# Patient Record
Sex: FEMALE | Race: WHITE | NOT HISPANIC OR LATINO | Employment: UNEMPLOYED | URBAN - METROPOLITAN AREA
[De-identification: names, ages, dates, MRNs, and addresses within clinical notes are randomized per-mention and may not be internally consistent; named-entity substitution may affect disease eponyms.]

---

## 2017-05-02 ENCOUNTER — HOSPITAL ENCOUNTER (EMERGENCY)
Facility: HOSPITAL | Age: 67
Discharge: HOME | End: 2017-05-02
Attending: EMERGENCY MEDICINE
Payer: COMMERCIAL

## 2017-05-02 VITALS
OXYGEN SATURATION: 96 % | TEMPERATURE: 98 F | WEIGHT: 161 LBS | DIASTOLIC BLOOD PRESSURE: 78 MMHG | HEART RATE: 65 BPM | RESPIRATION RATE: 16 BRPM | BODY MASS INDEX: 26.82 KG/M2 | HEIGHT: 65 IN | SYSTOLIC BLOOD PRESSURE: 169 MMHG

## 2017-05-02 DIAGNOSIS — G43.909 MIGRAINE WITHOUT STATUS MIGRAINOSUS, NOT INTRACTABLE, UNSPECIFIED MIGRAINE TYPE: Primary | ICD-10-CM

## 2017-05-02 LAB
ALBUMIN SERPL BCP-MCNC: 4.4 G/DL
ALP SERPL-CCNC: 188 U/L
ALT SERPL W/O P-5'-P-CCNC: 14 U/L
ANION GAP SERPL CALC-SCNC: 17 MMOL/L
AST SERPL-CCNC: 30 U/L
BASOPHILS # BLD AUTO: 0.02 K/UL
BASOPHILS NFR BLD: 0.3 %
BILIRUB SERPL-MCNC: 0.4 MG/DL
BUN SERPL-MCNC: 9 MG/DL
CALCIUM SERPL-MCNC: 9.6 MG/DL
CHLORIDE SERPL-SCNC: 100 MMOL/L
CO2 SERPL-SCNC: 22 MMOL/L
CREAT SERPL-MCNC: 0.7 MG/DL
DIFFERENTIAL METHOD: ABNORMAL
EOSINOPHIL # BLD AUTO: 0.1 K/UL
EOSINOPHIL NFR BLD: 0.6 %
ERYTHROCYTE [DISTWIDTH] IN BLOOD BY AUTOMATED COUNT: 13.5 %
EST. GFR  (AFRICAN AMERICAN): >60 ML/MIN/1.73 M^2
EST. GFR  (NON AFRICAN AMERICAN): >60 ML/MIN/1.73 M^2
GLUCOSE SERPL-MCNC: 125 MG/DL
HCT VFR BLD AUTO: 42.9 %
HGB BLD-MCNC: 14.4 G/DL
INR PPP: 1
LYMPHOCYTES # BLD AUTO: 0.9 K/UL
LYMPHOCYTES NFR BLD: 12.1 %
MCH RBC QN AUTO: 30.6 PG
MCHC RBC AUTO-ENTMCNC: 33.6 %
MCV RBC AUTO: 91 FL
MONOCYTES # BLD AUTO: 0.4 K/UL
MONOCYTES NFR BLD: 5.4 %
NEUTROPHILS # BLD AUTO: 6.3 K/UL
NEUTROPHILS NFR BLD: 81.5 %
PLATELET # BLD AUTO: 206 K/UL
PMV BLD AUTO: 11.2 FL
POTASSIUM SERPL-SCNC: 3.9 MMOL/L
PROT SERPL-MCNC: 8.6 G/DL
PROTHROMBIN TIME: 10.4 SEC
RBC # BLD AUTO: 4.71 M/UL
SODIUM SERPL-SCNC: 139 MMOL/L
WBC # BLD AUTO: 7.77 K/UL

## 2017-05-02 PROCEDURE — 96375 TX/PRO/DX INJ NEW DRUG ADDON: CPT

## 2017-05-02 PROCEDURE — 63600175 PHARM REV CODE 636 W HCPCS: Performed by: EMERGENCY MEDICINE

## 2017-05-02 PROCEDURE — 85025 COMPLETE CBC W/AUTO DIFF WBC: CPT

## 2017-05-02 PROCEDURE — 25000003 PHARM REV CODE 250: Performed by: EMERGENCY MEDICINE

## 2017-05-02 PROCEDURE — 99284 EMERGENCY DEPT VISIT MOD MDM: CPT | Mod: 25

## 2017-05-02 PROCEDURE — 85610 PROTHROMBIN TIME: CPT

## 2017-05-02 PROCEDURE — 96374 THER/PROPH/DIAG INJ IV PUSH: CPT

## 2017-05-02 PROCEDURE — 80053 COMPREHEN METABOLIC PANEL: CPT

## 2017-05-02 PROCEDURE — 96361 HYDRATE IV INFUSION ADD-ON: CPT

## 2017-05-02 RX ORDER — METOCLOPRAMIDE HYDROCHLORIDE 5 MG/ML
10 INJECTION INTRAMUSCULAR; INTRAVENOUS
Status: COMPLETED | OUTPATIENT
Start: 2017-05-02 | End: 2017-05-02

## 2017-05-02 RX ORDER — CYANOCOBALAMIN 1000 UG/ML
1000 INJECTION, SOLUTION INTRAMUSCULAR; SUBCUTANEOUS
COMMUNITY
Start: 2016-08-23 | End: 2017-08-18

## 2017-05-02 RX ORDER — ONDANSETRON 2 MG/ML
4 INJECTION INTRAMUSCULAR; INTRAVENOUS
Status: COMPLETED | OUTPATIENT
Start: 2017-05-02 | End: 2017-05-02

## 2017-05-02 RX ORDER — MORPHINE SULFATE 4 MG/ML
4 INJECTION, SOLUTION INTRAMUSCULAR; INTRAVENOUS
Status: COMPLETED | OUTPATIENT
Start: 2017-05-02 | End: 2017-05-02

## 2017-05-02 RX ORDER — ZOLPIDEM TARTRATE 10 MG/1
10 TABLET ORAL NIGHTLY PRN
COMMUNITY
Start: 2017-04-10 | End: 2023-10-09

## 2017-05-02 RX ORDER — KETOROLAC TROMETHAMINE 30 MG/ML
15 INJECTION, SOLUTION INTRAMUSCULAR; INTRAVENOUS
Status: COMPLETED | OUTPATIENT
Start: 2017-05-02 | End: 2017-05-02

## 2017-05-02 RX ORDER — DIPHENHYDRAMINE HYDROCHLORIDE 50 MG/ML
25 INJECTION INTRAMUSCULAR; INTRAVENOUS
Status: COMPLETED | OUTPATIENT
Start: 2017-05-02 | End: 2017-05-02

## 2017-05-02 RX ORDER — PROMETHAZINE HYDROCHLORIDE 25 MG/1
25 TABLET ORAL 3 TIMES DAILY PRN
COMMUNITY
Start: 2017-04-13 | End: 2019-01-30

## 2017-05-02 RX ORDER — ONDANSETRON 4 MG/1
4 TABLET, ORALLY DISINTEGRATING ORAL EVERY 6 HOURS PRN
Qty: 15 TABLET | Refills: 0 | Status: SHIPPED | OUTPATIENT
Start: 2017-05-02 | End: 2023-10-09

## 2017-05-02 RX ORDER — LORATADINE 10 MG/1
10 TABLET ORAL DAILY
COMMUNITY
Start: 2016-09-14 | End: 2017-09-14

## 2017-05-02 RX ADMIN — KETOROLAC TROMETHAMINE 15 MG: 30 INJECTION, SOLUTION INTRAMUSCULAR at 06:05

## 2017-05-02 RX ADMIN — ONDANSETRON 4 MG: 2 INJECTION INTRAMUSCULAR; INTRAVENOUS at 07:05

## 2017-05-02 RX ADMIN — DIPHENHYDRAMINE HYDROCHLORIDE 25 MG: 50 INJECTION, SOLUTION INTRAMUSCULAR; INTRAVENOUS at 06:05

## 2017-05-02 RX ADMIN — MORPHINE SULFATE 4 MG: 4 INJECTION, SOLUTION INTRAMUSCULAR; INTRAVENOUS at 07:05

## 2017-05-02 RX ADMIN — METOCLOPRAMIDE 10 MG: 5 INJECTION, SOLUTION INTRAMUSCULAR; INTRAVENOUS at 06:05

## 2017-05-02 RX ADMIN — SODIUM CHLORIDE 1000 ML: 0.9 INJECTION, SOLUTION INTRAVENOUS at 06:05

## 2017-05-02 NOTE — DISCHARGE INSTRUCTIONS
"  Migraine Headache: Stages and Treatment  A migraine headache tends to progress in stages. Learning these stages can help you better understand what is happening. Then you can learn ways to reduce pain and relieve other symptoms. Methods for relieving your symptoms include self-care and medicines.  Migraine stages  Migraines tend to progress through 4 stages. Many people don't have all stages, and stages may differ with each headache:  · Prodrome. A few hours to a day or so before the headache, you may feel tired, (yawning many times), uneasy, or van. You may also feel bloated or crave certain foods.  · Aura. Up to an hour before the headache starts, some migraine sufferers experience aura--flashing lights, blind spots, other vision problems, confusion, difficulty speaking, or other neurologic symptoms.  · Headache. Moderate to severe pain affects one side of the head and then can spread to both sides, often along with nausea. You may be highly sensitive to light, sound, and odors. Vomiting or diarrhea may also happen. This stage lasts 4 to 72 hours.  · Postdrome. After your headache ends, you may feel tired, achy, and "washed out." This may last for a day or so.    Self-care during a migraine  Here is what you can do:  · Use a cold compress. Wrap a thin cloth around a cold pack, a cold can of soda, or a bag of frozen vegetables. Apply this to your temple or other pain site.  · Drink fluids. If nausea makes it hard to drink, try sucking on ice.  · Rest. If possible, lie down. Try not to bend over, as this may increase your pain. Sometimes laying in a dark quiet room can help the migraine from being aggravated.    · Try caffeine. Some people find that drinking fluids with caffeine, such as coffee or tea, helps to lessen migraine pain.  Using medicines  Work with your healthcare provider to find the right medicines for you. Medicines for migraine may relieve pain (analgesics), relieve nausea, or attack the " migraine's root causes (migraine-specific medicines).  Rebound headache  Taking analgesics each day, or even several times a week, may lead to more frequent and severe headaches. These are called rebound headaches. If you think you're having rebound headaches, tell your healthcare provider. He or she can help you safely decrease your medicine. Rebound caffeine withdrawal headaches can also happen.    Date Last Reviewed: 10/9/2015  © 7728-7024 [employer]. [address] [place] [address], [place] [address]. All rights reserved. This information is not intended as a substitute for professional medical care. Always follow your healthcare professional's instructions.

## 2017-05-02 NOTE — ED PROVIDER NOTES
Encounter Date: 5/2/2017       History     Chief Complaint   Patient presents with    Migraine     Pt reports hx of migraines. Took imitrex x2 last PM with no relief. Also reports vomiting all night.      Review of patient's allergies indicates:   Allergen Reactions    Amitriptyline     Augmentin [amoxicillin-pot clavulanate]     Sulfa (sulfonamide antibiotics)      Patient is a 67 y.o. female presenting with the following complaint: headaches. The history is provided by the patient.   Headache    This is a recurrent problem. The current episode started yesterday. The problem occurs constantly. The problem has been unchanged. The pain is located in the frontal region. The pain does not radiate. The pain quality is similar to prior headaches. The quality of the pain is described as aching, dull, throbbing and similar to previous episodes. Pain scale: moderate. Associated symptoms include nausea, phonophobia, photophobia and vomiting. Pertinent negatives include no abdominal pain, abnormal behavior, anorexia, back pain, blurred vision, coughing, dizziness, drainage, ear pain, eye pain, eye redness, eye watering, facial sweating, fever, hearing loss, insomnia, loss of balance, muscle aches, neck pain, numbness, rhinorrhea, scalp tenderness, seizures, sinus pressure, sore throat, swollen glands, tingling, tinnitus, visual change, weakness or weight loss. The symptoms are aggravated by bright light and noise. She has tried triptans (imitrex, but pt thinks she vomitted it up) for the symptoms. The treatment provided no relief. Her past medical history is significant for migraine headaches and migraines in the family. There is no history of cancer, cluster headaches, hypertension, immunosuppression, obesity, pseudotumor cerebri, recent head traumas, sinus disease or TMJ.     Past Medical History:   Diagnosis Date    Chronic headaches     Depression     Diverticulitis     GERD (gastroesophageal reflux disease)      Hypercholesteremia     Hypertension     Migraine headache      Past Surgical History:   Procedure Laterality Date    ABDOMINAL SURGERY      APPENDECTOMY      CATARACT EXTRACTION      EYE SURGERY      HYSTERECTOMY      TONSILLECTOMY       History reviewed. No pertinent family history.  Social History   Substance Use Topics    Smoking status: Former Smoker    Smokeless tobacco: Never Used    Alcohol use No     Review of Systems   Constitutional: Negative for fever and weight loss.   HENT: Negative for ear pain, hearing loss, rhinorrhea, sinus pressure, sore throat and tinnitus.    Eyes: Positive for photophobia. Negative for blurred vision, pain and redness.   Respiratory: Negative for cough and shortness of breath.    Cardiovascular: Negative for chest pain.   Gastrointestinal: Positive for nausea and vomiting. Negative for abdominal pain and anorexia.   Genitourinary: Negative for dysuria.   Musculoskeletal: Negative for back pain and neck pain.   Skin: Negative for rash.   Neurological: Positive for headaches. Negative for dizziness, tingling, seizures, weakness, numbness and loss of balance.   Hematological: Does not bruise/bleed easily.   Psychiatric/Behavioral: The patient does not have insomnia.    All other systems reviewed and are negative.      Physical Exam   Initial Vitals   BP Pulse Resp Temp SpO2   05/02/17 0520 05/02/17 0520 05/02/17 0520 05/02/17 0520 05/02/17 0520   173/92 81 20 97.7 °F (36.5 °C) 95 %     Physical Exam    Nursing note and vitals reviewed.  Constitutional: She appears well-developed and well-nourished.   HENT:   Head: Normocephalic and atraumatic. Head is without contusion.       Right Ear: Hearing, tympanic membrane, external ear and ear canal normal.   Left Ear: Hearing, tympanic membrane, external ear and ear canal normal.   Nose: Nose normal.   Mouth/Throat: Uvula is midline, oropharynx is clear and moist and mucous membranes are normal. No oropharyngeal exudate.   Eyes:  "Conjunctivae and EOM are normal. Pupils are equal, round, and reactive to light.   Neck: Normal range of motion. Neck supple. No thyromegaly present.   Cardiovascular: Normal rate, regular rhythm, normal heart sounds and intact distal pulses. Exam reveals no gallop and no friction rub.    No murmur heard.  Pulmonary/Chest: Breath sounds normal. No respiratory distress. She has no wheezes. She has no rhonchi. She exhibits no tenderness.   Abdominal: Soft. Bowel sounds are normal. She exhibits no distension. There is no tenderness. There is no rebound and no guarding.   Musculoskeletal: Normal range of motion. She exhibits no edema or tenderness.   Lymphadenopathy:     She has no cervical adenopathy.   Neurological: She is alert and oriented to person, place, and time. She has normal strength. No cranial nerve deficit or sensory deficit.   Skin: Skin is warm and dry. No rash noted.   Psychiatric: She has a normal mood and affect. Her behavior is normal. Judgment and thought content normal.         ED Course   Procedures  Labs Reviewed   CBC W/ AUTO DIFFERENTIAL - Abnormal; Notable for the following:        Result Value    Lymph # 0.9 (*)     Gran% 81.5 (*)     Lymph% 12.1 (*)     All other components within normal limits   COMPREHENSIVE METABOLIC PANEL - Abnormal; Notable for the following:     CO2 22 (*)     Glucose 125 (*)     Total Protein 8.6 (*)     Alkaline Phosphatase 188 (*)     Anion Gap 17 (*)     All other components within normal limits   PROTIME-INR          Vitals:    05/02/17 0520   BP: (!) 173/92   Pulse: 81   Resp: 20   Temp: 97.7 °F (36.5 °C)   TempSrc: Oral   SpO2: 95%   Weight: 73 kg (161 lb)   Height: 5' 5" (1.651 m)       Results for orders placed or performed during the hospital encounter of 05/02/17   CBC auto differential   Result Value Ref Range    WBC 7.77 3.90 - 12.70 K/uL    RBC 4.71 4.00 - 5.40 M/uL    Hemoglobin 14.4 12.0 - 16.0 g/dL    Hematocrit 42.9 37.0 - 48.5 %    MCV 91 82 - 98 fL "    MCH 30.6 27.0 - 31.0 pg    MCHC 33.6 32.0 - 36.0 %    RDW 13.5 11.5 - 14.5 %    Platelets 206 150 - 350 K/uL    MPV 11.2 9.2 - 12.9 fL    Gran # 6.3 1.8 - 7.7 K/uL    Lymph # 0.9 (L) 1.0 - 4.8 K/uL    Mono # 0.4 0.3 - 1.0 K/uL    Eos # 0.1 0.0 - 0.5 K/uL    Baso # 0.02 0.00 - 0.20 K/uL    Gran% 81.5 (H) 38.0 - 73.0 %    Lymph% 12.1 (L) 18.0 - 48.0 %    Mono% 5.4 4.0 - 15.0 %    Eosinophil% 0.6 0.0 - 8.0 %    Basophil% 0.3 0.0 - 1.9 %    Differential Method Automated    Comprehensive metabolic panel   Result Value Ref Range    Sodium 139 136 - 145 mmol/L    Potassium 3.9 3.5 - 5.1 mmol/L    Chloride 100 95 - 110 mmol/L    CO2 22 (L) 23 - 29 mmol/L    Glucose 125 (H) 70 - 110 mg/dL    BUN, Bld 9 8 - 23 mg/dL    Creatinine 0.7 0.5 - 1.4 mg/dL    Calcium 9.6 8.7 - 10.5 mg/dL    Total Protein 8.6 (H) 6.0 - 8.4 g/dL    Albumin 4.4 3.5 - 5.2 g/dL    Total Bilirubin 0.4 0.1 - 1.0 mg/dL    Alkaline Phosphatase 188 (H) 55 - 135 U/L    AST 30 10 - 40 U/L    ALT 14 10 - 44 U/L    Anion Gap 17 (H) 8 - 16 mmol/L    eGFR if African American >60.0 >60 mL/min/1.73 m^2    eGFR if non African American >60.0 >60 mL/min/1.73 m^2   Protime-INR   Result Value Ref Range    Prothrombin Time 10.4 9.0 - 12.5 sec    INR 1.0 0.8 - 1.2         Imaging Results     None          Medications   sodium chloride 0.9% bolus 1,000 mL (1,000 mLs Intravenous New Bag 5/2/17 0611)   metoclopramide HCl injection 10 mg (10 mg Intravenous Given 5/2/17 0609)   diphenhydrAMINE injection 25 mg (25 mg Intravenous Given 5/2/17 0611)   ketorolac injection 15 mg (15 mg Intravenous Given 5/2/17 0611)   morphine injection 4 mg (4 mg Intravenous Given 5/2/17 0702)   ondansetron injection 4 mg (4 mg Intravenous Given 5/2/17 0702)     6:13 AM - Transfer of Care: Patient care transferred from Dr. Perez to Dr. Coreas, pending labs and reevaluation of HA.        6:50 AM - Re-evaluation:  The patient is resting comfortably and is in no acute distress. Discussed test  results and notified of pending labs. Answered questions at this time.     7:17 AM: Reassessed pt at this time.  Pt states her condition has improved at this time. Discussed with pt all pertinent ED information and results. Discussed pt dx of migraine and plan of tx. Gave pt all f/u and return to the ED instructions. All questions and concerns were addressed at this time. Pt expresses understanding of information and instructions, and is comfortable with plan to discharge. Pt is stable for discharge.        Gaby Stanley was given a handout which discussed their disease process, precautions, and instructions for follow-up and therapy.    Follow-up Information     Follow up with Santos Sharma MD In 2 days.    Specialty:  Internal Medicine    Contact information:    [id number] [place] [id number]  [phone]            New Prescriptions    ONDANSETRON (ZOFRAN-ODT) 4 MG TBDL    Take 1 tablet (4 mg total) by mouth every 6 (six) hours as needed.          ED Diagnosis  1. Migraine without status migrainosus, not intractable, unspecified migraine type                             ED Course     Clinical Impression:   The encounter diagnosis was Migraine without status migrainosus, not intractable, unspecified migraine type.    Disposition:   Disposition: Discharged       James Coreas MD  05/02/17 0712

## 2017-05-02 NOTE — ED NOTES
Pt reports minimal relief from toradol, benedryl & reglan. States she is still nauseated & her JENKINS is unchanged. Dr. Coreas notified.

## 2017-05-02 NOTE — ED AVS SNAPSHOT
[facility]  [address]  [place] [address]               Gaby Stanley   2017  5:27 AM   ED    Description:  Female : 1950   Department:  [facility]           Your Care was Coordinated By:     Provider Role From To    Vini Perez Jr., MD Attending Provider 17 0529 --      Reason for Visit     Migraine           Diagnoses this Visit        Comments    Migraine without status migrainosus, not intractable, unspecified migraine type    -  Primary       ED Disposition     ED Disposition Condition Comment    Discharge             To Do List           Follow-up Information     Follow up with Santos Sharma MD In 2 days.    Specialty:  Internal Medicine    Contact information:    [address]  [place] [address]  [phone]         These Medications        Disp Refills Start End    ondansetron (ZOFRAN-ODT) 4 MG TbDL 15 tablet 0 2017     Take 1 tablet (4 mg total) by mouth every 6 (six) hours as needed. - Oral    Pharmacy: [facility] - [place], [place] - [address] Ph #: [phone]         [facility]     [facility] Care Line -  Assistance  Unless otherwise directed by your provider, please contact [facility], our nurse care line that is available for  assistance.     Registered nurses in the [facility] provide: appointment scheduling, clinical advisement, health education, and other advisory services.  Call: [phone] (toll free)               Medications           Message regarding Medications     Verify the changes and/or additions to your medication regime listed below are the same as discussed with your clinician today.  If any of these changes or additions are incorrect, please notify your healthcare provider.        START taking these NEW medications        Refills    ondansetron (ZOFRAN-ODT) 4 MG TbDL 0    Sig: Take 1 tablet (4 mg  total) by mouth every 6 (six) hours as needed.    Class: Print    Route: Oral      These medications were administered today        Dose Freq    sodium chloride 0.9% bolus 1,000 mL 1,000 mL ED 1 Time    Sig: Inject 1,000 mLs into the vein ED 1 Time.    Class: Normal    Route: Intravenous    metoclopramide HCl injection 10 mg 10 mg ED 1 Time    Sig: Inject 2 mLs (10 mg total) into the vein ED 1 Time.    Class: Normal    Route: Intravenous    diphenhydrAMINE injection 25 mg 25 mg ED 1 Time    Sig: Inject 0.5 mLs (25 mg total) into the vein ED 1 Time.    Class: Normal    Route: Intravenous    ketorolac injection 15 mg 15 mg ED 1 Time    Sig: Inject 15 mg into the vein ED 1 Time.    Class: Normal    Route: Intravenous    morphine injection 4 mg 4 mg ED 1 Time    Sig: Inject 1 mL (4 mg total) into the vein ED 1 Time.    Class: Normal    Route: Intravenous    ondansetron injection 4 mg 4 mg ED 1 Time    Sig: Inject 4 mg into the vein ED 1 Time.    Class: Normal    Route: Intravenous      STOP taking these medications     ferrous gluconate (FERGON) 324 MG tablet Take 324 mg by mouth daily with breakfast.    ezetimibe (ZETIA) 10 mg tablet Take 10 mg by mouth once daily.    BUTALB/ACETAMINOPHEN/CAFFEINE (FIORICET ORAL) Take by mouth as needed.     ondansetron (ZOFRAN) 4 MG tablet Take 1 tablet (4 mg total) by mouth every 8 (eight) hours as needed.           Verify that the below list of medications is an accurate representation of the medications you are currently taking.  If none reported, the list may be blank. If incorrect, please contact your healthcare provider. Carry this list with you in case of emergency.           Current Medications     amlodipine-benazepril 5-10 mg (LOTREL) 5-10 mg per capsule Take 1 capsule by mouth once daily.    cyanocobalamin 1,000 mcg/mL injection Inject 1,000 mcg into the muscle every 30 days.    lipase-protease-amylase (ZENPEP) 25,000-85,000- 136,000 unit CpDR Take by mouth before meals and  "at bedtime as needed.    loratadine (CLARITIN) 10 mg tablet Take 10 mg by mouth once daily at 6am.    pantoprazole (PROTONIX) 40 MG tablet Take 40 mg by mouth 2 (two) times daily.     promethazine (PHENERGAN) 25 MG tablet Take 25 mg by mouth 3 (three) times daily as needed.    ROSUVASTATIN CALCIUM (CRESTOR ORAL) Take by mouth.    sertraline (ZOLOFT) 100 MG tablet Take 100 mg by mouth once daily.    SUMATRIPTAN SUCCINATE (IMITREX ORAL) Take by mouth as needed.     zolpidem (AMBIEN) 10 mg Tab Take 10 mg by mouth nightly as needed.    ondansetron (ZOFRAN-ODT) 4 MG TbDL Take 1 tablet (4 mg total) by mouth every 6 (six) hours as needed.           Clinical Reference Information           Your Vitals Were     BP Pulse Temp Resp Height Weight    173/92 (BP Location: Right arm, Patient Position: Sitting) 81 97.7 °F (36.5 °C) (Oral) 20 5' 5" (1.651 m) 73 kg (161 lb)    SpO2 BMI             95% 26.79 kg/m2         Allergies as of 5/2/2017        Reactions    Amitriptyline     Augmentin [Amoxicillin-pot Clavulanate]     Sulfa (Sulfonamide Antibiotics)       Immunizations Administered on Date of Encounter - 5/2/2017     None      ED Micro, Lab, POCT     Start Ordered       Status Ordering Provider    05/02/17 0547 05/02/17 0547  CBC auto differential  STAT      Final result     05/02/17 0547 05/02/17 0547  Comprehensive metabolic panel  STAT      Final result     05/02/17 0547 05/02/17 0547  Protime-INR  STAT      Final result       ED Imaging Orders     None        Discharge Instructions         Migraine Headache: Stages and Treatment  A migraine headache tends to progress in stages. Learning these stages can help you better understand what is happening. Then you can learn ways to reduce pain and relieve other symptoms. Methods for relieving your symptoms include self-care and medicines.  Migraine stages  Migraines tend to progress through 4 stages. Many people don't have all stages, and stages may differ with each " "headache:  · Prodrome. A few hours to a day or so before the headache, you may feel tired, (yawning many times), uneasy, or van. You may also feel bloated or crave certain foods.  · Aura. Up to an hour before the headache starts, some migraine sufferers experience aura--flashing lights, blind spots, other vision problems, confusion, difficulty speaking, or other neurologic symptoms.  · Headache. Moderate to severe pain affects one side of the head and then can spread to both sides, often along with nausea. You may be highly sensitive to light, sound, and odors. Vomiting or diarrhea may also happen. This stage lasts 4 to 72 hours.  · Postdrome. After your headache ends, you may feel tired, achy, and "washed out." This may last for a day or so.    Self-care during a migraine  Here is what you can do:  · Use a cold compress. Wrap a thin cloth around a cold pack, a cold can of soda, or a bag of frozen vegetables. Apply this to your temple or other pain site.  · Drink fluids. If nausea makes it hard to drink, try sucking on ice.  · Rest. If possible, lie down. Try not to bend over, as this may increase your pain. Sometimes laying in a dark quiet room can help the migraine from being aggravated.    · Try caffeine. Some people find that drinking fluids with caffeine, such as coffee or tea, helps to lessen migraine pain.  Using medicines  Work with your healthcare provider to find the right medicines for you. Medicines for migraine may relieve pain (analgesics), relieve nausea, or attack the migraine's root causes (migraine-specific medicines).  Rebound headache  Taking analgesics each day, or even several times a week, may lead to more frequent and severe headaches. These are called rebound headaches. If you think you're having rebound headaches, tell your healthcare provider. He or she can help you safely decrease your medicine. Rebound caffeine withdrawal headaches can also happen.    Date Last Reviewed: 10/9/2015  © " 5153-9209 [employer]. [address] [place] [address], [place] [address]. All rights reserved. This information is not intended as a substitute for professional medical care. Always follow your healthcare professional's instructions.          [facility] Sign-Up     Activating your [facility] account is as easy as 1-2-3!     1) Visit [email or web address], select Sign Up Now, enter this activation code and your date of birth, then select Next.  [email or web address]  Expires: 6/16/2017  7:16 AM      2) Create a username and password to use when you visit [facility] in the future and select a security question in case you lose your password and select Next.    3) Enter your e-mail address and click Sign Up!    Additional Information  If you have questions, please e-mail [email or web address] or call [phone] to talk to our [facility] staff. Remember, [facility] is NOT to be used for urgent needs. For medical emergencies, dial 911.          [facility] complies with applicable Federal civil rights laws and does not discriminate on the basis of race, color, national origin, age, disability, or sex.        Language Assistance Services     ATTENTION: Language assistance services are available, free of charge. Please call [phone].      ATENCIÓN: Si habla español, tiene a call disposición servicios gratuitos de asistencia lingüística. Llame al [phone].     CHÚ Ý: N?u b?n nói Ti?ng Vi?t, có các d?ch v? h? tr? ngôn ng? mi?n phí dành cho b?n. G?i s? [phone].

## 2019-01-30 ENCOUNTER — HOSPITAL ENCOUNTER (EMERGENCY)
Facility: HOSPITAL | Age: 69
Discharge: HOME | End: 2019-01-30
Attending: EMERGENCY MEDICINE
Payer: COMMERCIAL

## 2019-01-30 VITALS
OXYGEN SATURATION: 99 % | SYSTOLIC BLOOD PRESSURE: 187 MMHG | TEMPERATURE: 98 F | RESPIRATION RATE: 16 BRPM | BODY MASS INDEX: 26.27 KG/M2 | HEIGHT: 64 IN | HEART RATE: 79 BPM | DIASTOLIC BLOOD PRESSURE: 89 MMHG | WEIGHT: 153.88 LBS

## 2019-01-30 DIAGNOSIS — K52.9 GASTROENTERITIS: Primary | ICD-10-CM

## 2019-01-30 DIAGNOSIS — R51.9 NONINTRACTABLE HEADACHE, UNSPECIFIED CHRONICITY PATTERN, UNSPECIFIED HEADACHE TYPE: ICD-10-CM

## 2019-01-30 DIAGNOSIS — I10 CHRONIC HYPERTENSION: ICD-10-CM

## 2019-01-30 LAB
ALBUMIN SERPL BCP-MCNC: 4.7 G/DL
ALP SERPL-CCNC: 151 U/L
ALT SERPL W/O P-5'-P-CCNC: 9 U/L
ANION GAP SERPL CALC-SCNC: 17 MMOL/L
AST SERPL-CCNC: 17 U/L
BASOPHILS # BLD AUTO: 0.02 K/UL
BASOPHILS NFR BLD: 0.2 %
BILIRUB SERPL-MCNC: 0.4 MG/DL
BILIRUB UR QL STRIP: NEGATIVE
BUN SERPL-MCNC: 12 MG/DL
CALCIUM SERPL-MCNC: 10.6 MG/DL
CHLORIDE SERPL-SCNC: 98 MMOL/L
CLARITY UR REFRACT.AUTO: CLEAR
CO2 SERPL-SCNC: 30 MMOL/L
COLOR UR AUTO: YELLOW
CREAT SERPL-MCNC: 0.8 MG/DL
DIFFERENTIAL METHOD: ABNORMAL
EOSINOPHIL # BLD AUTO: 0.1 K/UL
EOSINOPHIL NFR BLD: 0.6 %
ERYTHROCYTE [DISTWIDTH] IN BLOOD BY AUTOMATED COUNT: 13.3 %
EST. GFR  (AFRICAN AMERICAN): >60 ML/MIN/1.73 M^2
EST. GFR  (NON AFRICAN AMERICAN): >60 ML/MIN/1.73 M^2
GLUCOSE SERPL-MCNC: 109 MG/DL
GLUCOSE UR QL STRIP: NEGATIVE
HCT VFR BLD AUTO: 45.1 %
HGB BLD-MCNC: 14.9 G/DL
HGB UR QL STRIP: NEGATIVE
KETONES UR QL STRIP: NEGATIVE
LEUKOCYTE ESTERASE UR QL STRIP: NEGATIVE
LYMPHOCYTES # BLD AUTO: 1.4 K/UL
LYMPHOCYTES NFR BLD: 15 %
MCH RBC QN AUTO: 30.5 PG
MCHC RBC AUTO-ENTMCNC: 33 G/DL
MCV RBC AUTO: 92 FL
MONOCYTES # BLD AUTO: 0.9 K/UL
MONOCYTES NFR BLD: 9.8 %
NEUTROPHILS # BLD AUTO: 6.7 K/UL
NEUTROPHILS NFR BLD: 74.3 %
NITRITE UR QL STRIP: NEGATIVE
PH UR STRIP: 7 [PH] (ref 5–8)
PLATELET # BLD AUTO: 246 K/UL
PMV BLD AUTO: 10.7 FL
POTASSIUM SERPL-SCNC: 3.3 MMOL/L
PROT SERPL-MCNC: 8.5 G/DL
PROT UR QL STRIP: ABNORMAL
RBC # BLD AUTO: 4.89 M/UL
SODIUM SERPL-SCNC: 145 MMOL/L
SP GR UR STRIP: 1.01 (ref 1–1.03)
URN SPEC COLLECT METH UR: ABNORMAL
UROBILINOGEN UR STRIP-ACNC: NEGATIVE EU/DL
WBC # BLD AUTO: 9.01 K/UL

## 2019-01-30 PROCEDURE — 25000003 PHARM REV CODE 250: Mod: ER | Performed by: EMERGENCY MEDICINE

## 2019-01-30 PROCEDURE — 81003 URINALYSIS AUTO W/O SCOPE: CPT | Mod: ER

## 2019-01-30 PROCEDURE — 96365 THER/PROPH/DIAG IV INF INIT: CPT | Mod: ER

## 2019-01-30 PROCEDURE — 96361 HYDRATE IV INFUSION ADD-ON: CPT | Mod: ER

## 2019-01-30 PROCEDURE — 96372 THER/PROPH/DIAG INJ SC/IM: CPT | Mod: ER

## 2019-01-30 PROCEDURE — 80053 COMPREHEN METABOLIC PANEL: CPT | Mod: ER

## 2019-01-30 PROCEDURE — 99284 EMERGENCY DEPT VISIT MOD MDM: CPT | Mod: 25,ER

## 2019-01-30 PROCEDURE — 85025 COMPLETE CBC W/AUTO DIFF WBC: CPT | Mod: ER

## 2019-01-30 PROCEDURE — 63600175 PHARM REV CODE 636 W HCPCS: Mod: ER | Performed by: EMERGENCY MEDICINE

## 2019-01-30 RX ORDER — ACETAMINOPHEN 500 MG
1000 TABLET ORAL
Status: DISCONTINUED | OUTPATIENT
Start: 2019-01-30 | End: 2019-01-31 | Stop reason: HOSPADM

## 2019-01-30 RX ORDER — PROMETHAZINE HYDROCHLORIDE 25 MG/1
12.5 TABLET ORAL EVERY 6 HOURS PRN
Qty: 15 TABLET | Refills: 0 | Status: SHIPPED | OUTPATIENT
Start: 2019-01-30 | End: 2024-03-12

## 2019-01-30 RX ORDER — ONDANSETRON 4 MG/1
4 TABLET, ORALLY DISINTEGRATING ORAL
Status: COMPLETED | OUTPATIENT
Start: 2019-01-30 | End: 2019-01-30

## 2019-01-30 RX ORDER — AMLODIPINE BESYLATE 5 MG/1
5 TABLET ORAL
Status: DISCONTINUED | OUTPATIENT
Start: 2019-01-30 | End: 2019-01-30

## 2019-01-30 RX ORDER — AMLODIPINE BESYLATE 5 MG/1
10 TABLET ORAL
Status: COMPLETED | OUTPATIENT
Start: 2019-01-30 | End: 2019-01-30

## 2019-01-30 RX ORDER — METOCLOPRAMIDE HYDROCHLORIDE 5 MG/ML
10 INJECTION INTRAMUSCULAR; INTRAVENOUS
Status: COMPLETED | OUTPATIENT
Start: 2019-01-30 | End: 2019-01-30

## 2019-01-30 RX ADMIN — ONDANSETRON 4 MG: 4 TABLET, ORALLY DISINTEGRATING ORAL at 07:01

## 2019-01-30 RX ADMIN — SODIUM CHLORIDE 1000 ML: 0.9 INJECTION, SOLUTION INTRAVENOUS at 08:01

## 2019-01-30 RX ADMIN — PROMETHAZINE HYDROCHLORIDE 12.5 MG: 25 INJECTION INTRAMUSCULAR; INTRAVENOUS at 08:01

## 2019-01-30 RX ADMIN — METOCLOPRAMIDE 10 MG: 5 INJECTION, SOLUTION INTRAMUSCULAR; INTRAVENOUS at 07:01

## 2019-01-30 RX ADMIN — AMLODIPINE BESYLATE 10 MG: 5 TABLET ORAL at 10:01

## 2019-02-04 NOTE — ED PROVIDER NOTES
"Encounter Date: 1/30/2019       History     Chief Complaint   Patient presents with    Nausea     c/o nausea, vomiting  with headache was seen at pcp on monday for "sinus" and put on azithromycin, medrol     Patient currently presents with chief complaint of nausea and vomiting.  Onset noted 1 day ago.  Patient notably was given Rx for Medrol and Azithromycin for mgmt of "sinus infection."  There have been several bouts of emesis and associated diarrhea.  Patient denies fever.  Patient denies sustained abdominal pain.  Patient denies urinary symptoms.  There is not blood in the stools.  Patient notes JENKINS began this afternoon.          Review of patient's allergies indicates:   Allergen Reactions    Amitriptyline     Augmentin [amoxicillin-pot clavulanate]     Sulfa (sulfonamide antibiotics)      Past Medical History:   Diagnosis Date    Chronic headaches     Depression     Diverticulitis     GERD (gastroesophageal reflux disease)     Hypercholesteremia     Hypertension     Migraine headache      Past Surgical History:   Procedure Laterality Date    ABDOMINAL SURGERY      APPENDECTOMY      CATARACT EXTRACTION      EYE SURGERY      HYSTERECTOMY      TONSILLECTOMY       History reviewed. No pertinent family history.  Social History     Tobacco Use    Smoking status: Former Smoker    Smokeless tobacco: Never Used   Substance Use Topics    Alcohol use: No    Drug use: No     Review of Systems   Constitutional: Negative for chills and fever.   HENT: Negative for congestion and rhinorrhea.    Respiratory: Negative for cough, chest tightness, shortness of breath and wheezing.    Cardiovascular: Negative for chest pain, palpitations and leg swelling.   Gastrointestinal: Positive for diarrhea, nausea and vomiting. Negative for abdominal pain and constipation.   Genitourinary: Negative for dysuria, frequency, urgency, vaginal bleeding and vaginal discharge.   Skin: Negative for color change and rash. " "  Allergic/Immunologic: Negative for immunocompromised state.   Neurological: Positive for headaches. Negative for dizziness, weakness and numbness.   Hematological: Negative for adenopathy. Does not bruise/bleed easily.   All other systems reviewed and are negative.    Physical Exam     Initial Vitals [01/30/19 1915]   BP Pulse Resp Temp SpO2   (!) 181/87 94 20 97.5 °F (36.4 °C) 98 %      MAP       --         Vitals:    01/30/19 1915 01/30/19 1934 01/30/19 1937 01/30/19 1940   BP: (!) 181/87 (S) (!) 204/99 (S) (!) 185/105 (S) (!) 150/90   Pulse: 94 (S) 97 97 (S) 97   Resp: 20      Temp: 97.5 °F (36.4 °C)      TempSrc: Oral      SpO2: 98%      Weight: 69.8 kg (153 lb 14.1 oz)      Height: 5' 4" (1.626 m)       01/30/19 2128 01/30/19 2242 01/30/19 2245   BP: (!) 180/92 (!) 187/89    Pulse: 85  79   Resp:   16   Temp:      TempSrc:      SpO2: 98%  99%   Weight:      Height:            Physical Exam    Nursing note and vitals reviewed.  Constitutional: She appears well-developed and well-nourished. She is not diaphoretic. No distress.   HENT:   Head: Normocephalic and atraumatic.   Right Ear: External ear normal.   Left Ear: External ear normal.   Nose: Nose normal.   Mouth/Throat: Oropharynx is clear and moist.   Eyes: Conjunctivae and EOM are normal. Pupils are equal, round, and reactive to light. No scleral icterus.   Neck: Neck supple. No tracheal deviation present. No JVD present.   Cardiovascular: Normal rate, regular rhythm, normal heart sounds and intact distal pulses. Exam reveals no gallop and no friction rub.    No murmur heard.  Pulmonary/Chest: Breath sounds normal. No respiratory distress. She has no wheezes. She has no rhonchi. She has no rales.   Abdominal: Soft. Bowel sounds are normal. She exhibits no distension. There is no tenderness.   Musculoskeletal: Normal range of motion. She exhibits no edema.   Neurological: She is alert and oriented to person, place, and time. She has normal strength. No " cranial nerve deficit or sensory deficit.   Skin: Skin is warm and dry. No rash noted.   Psychiatric: She has a normal mood and affect. Her behavior is normal.         ED Course   Procedures  Labs Reviewed   CBC W/ AUTO DIFFERENTIAL - Abnormal; Notable for the following components:       Result Value    Gran% 74.3 (*)     Lymph% 15.0 (*)     All other components within normal limits   COMPREHENSIVE METABOLIC PANEL - Abnormal; Notable for the following components:    Potassium 3.3 (*)     CO2 30 (*)     Calcium 10.6 (*)     Total Protein 8.5 (*)     Alkaline Phosphatase 151 (*)     ALT 9 (*)     Anion Gap 17 (*)     All other components within normal limits   URINALYSIS, REFLEX TO URINE CULTURE - Abnormal; Notable for the following components:    Protein, UA Trace (*)     All other components within normal limits    Narrative:     Preferred Collection Type->Urine, Clean Catch          Imaging Results    None          Medical Decision Making:   ED Management:  All historical and physical findings were reviewed with the patient in detail.  Patient was advised of a diagnosis of acute viral gastroenteritis and HA.  The patient's current headache seems to fit the intensity and pattern of prior headaches.  Patient has no evidence of infection nor neurological findings to suggest a more malignant cause for her headache.  On reevaluation the patient has noted considerable improvement and is comfortable for discharge.  Patient appears adequately hydrated at this time but was advised to maintain generous hydration and gradually advance bland food intake with the use of antiemetics.  Patient was also counseled regarding use of kaopectate for management of diarrhea should it become necessary.  All remaining questions and concerns were addressed at that time.      I see no indication of an emergent process beyond that addressed during our encounter but have duly counseled the patient/family regarding the need for prompt follow-up  as well as the indications (including but not limited to intractable vomiting, prolonged diarrhea, fever, sustained abdominal pain) that should prompt immediate return to the emergency room should new or worrisome developments occur.                        Clinical Impression:   The primary encounter diagnosis was Gastroenteritis. Diagnoses of Nonintractable headache, unspecified chronicity pattern, unspecified headache type and Chronic hypertension were also pertinent to this visit.                             Andry Witt MD  02/03/19 6331

## 2019-12-16 ENCOUNTER — LAB VISIT (OUTPATIENT)
Dept: LAB | Facility: HOSPITAL | Age: 69
End: 2019-12-16
Attending: UROLOGY
Payer: COMMERCIAL

## 2019-12-16 DIAGNOSIS — R35.0 URINARY FREQUENCY: ICD-10-CM

## 2019-12-16 LAB
BACTERIA #/AREA URNS AUTO: ABNORMAL /HPF
BILIRUB UR QL STRIP: NEGATIVE
CLARITY UR REFRACT.AUTO: ABNORMAL
COLOR UR AUTO: YELLOW
GLUCOSE UR QL STRIP: NEGATIVE
HGB UR QL STRIP: ABNORMAL
KETONES UR QL STRIP: NEGATIVE
LEUKOCYTE ESTERASE UR QL STRIP: ABNORMAL
MICROSCOPIC COMMENT: ABNORMAL
NITRITE UR QL STRIP: NEGATIVE
PH UR STRIP: 6 [PH] (ref 5–8)
PROT UR QL STRIP: ABNORMAL
RBC #/AREA URNS AUTO: 2 /HPF (ref 0–4)
SP GR UR STRIP: 1.02 (ref 1–1.03)
SQUAMOUS #/AREA URNS AUTO: 1 /HPF
URN SPEC COLLECT METH UR: ABNORMAL
UROBILINOGEN UR STRIP-ACNC: NEGATIVE EU/DL
WBC #/AREA URNS AUTO: >100 /HPF (ref 0–5)
WBC CLUMPS UR QL AUTO: ABNORMAL

## 2019-12-16 PROCEDURE — 87088 URINE BACTERIA CULTURE: CPT

## 2019-12-16 PROCEDURE — 81000 URINALYSIS NONAUTO W/SCOPE: CPT | Mod: PO

## 2019-12-16 PROCEDURE — 87086 URINE CULTURE/COLONY COUNT: CPT

## 2019-12-16 PROCEDURE — 87077 CULTURE AEROBIC IDENTIFY: CPT

## 2019-12-16 PROCEDURE — 87186 SC STD MICRODIL/AGAR DIL: CPT

## 2019-12-19 LAB — BACTERIA UR CULT: ABNORMAL

## 2020-10-07 ENCOUNTER — HOSPITAL ENCOUNTER (EMERGENCY)
Facility: HOSPITAL | Age: 70
Discharge: HOME | End: 2020-10-08
Attending: EMERGENCY MEDICINE
Payer: COMMERCIAL

## 2020-10-07 DIAGNOSIS — E87.6 HYPOKALEMIA: Primary | ICD-10-CM

## 2020-10-07 DIAGNOSIS — N12 PYELONEPHRITIS: ICD-10-CM

## 2020-10-07 DIAGNOSIS — R11.0 NAUSEA: ICD-10-CM

## 2020-10-07 LAB
ALBUMIN SERPL BCP-MCNC: 4 G/DL (ref 3.5–5.2)
ALP SERPL-CCNC: 146 U/L (ref 55–135)
ALT SERPL W/O P-5'-P-CCNC: 8 U/L (ref 10–44)
ANION GAP SERPL CALC-SCNC: 11 MMOL/L (ref 8–16)
AST SERPL-CCNC: 16 U/L (ref 10–40)
BACTERIA #/AREA URNS AUTO: ABNORMAL /HPF
BASOPHILS # BLD AUTO: 0.01 K/UL (ref 0–0.2)
BASOPHILS NFR BLD: 0.2 % (ref 0–1.9)
BILIRUB SERPL-MCNC: 0.2 MG/DL (ref 0.1–1)
BILIRUB UR QL STRIP: NEGATIVE
BNP SERPL-MCNC: 135 PG/ML (ref 0–99)
BUN SERPL-MCNC: 12 MG/DL (ref 8–23)
CALCIUM SERPL-MCNC: 8.9 MG/DL (ref 8.7–10.5)
CHLORIDE SERPL-SCNC: 105 MMOL/L (ref 95–110)
CLARITY UR REFRACT.AUTO: CLEAR
CO2 SERPL-SCNC: 28 MMOL/L (ref 23–29)
COLOR UR AUTO: YELLOW
CREAT SERPL-MCNC: 0.8 MG/DL (ref 0.5–1.4)
DIFFERENTIAL METHOD: ABNORMAL
EOSINOPHIL # BLD AUTO: 0.1 K/UL (ref 0–0.5)
EOSINOPHIL NFR BLD: 1 % (ref 0–8)
ERYTHROCYTE [DISTWIDTH] IN BLOOD BY AUTOMATED COUNT: 15 % (ref 11.5–14.5)
EST. GFR  (AFRICAN AMERICAN): >60 ML/MIN/1.73 M^2
EST. GFR  (NON AFRICAN AMERICAN): >60 ML/MIN/1.73 M^2
GLUCOSE SERPL-MCNC: 99 MG/DL (ref 70–110)
GLUCOSE UR QL STRIP: NEGATIVE
HCT VFR BLD AUTO: 34 % (ref 37–48.5)
HGB BLD-MCNC: 10.9 G/DL (ref 12–16)
HGB UR QL STRIP: ABNORMAL
IMM GRANULOCYTES # BLD AUTO: 0.02 K/UL (ref 0–0.04)
IMM GRANULOCYTES NFR BLD AUTO: 0.3 % (ref 0–0.5)
INFLUENZA A, MOLECULAR: NEGATIVE
INFLUENZA B, MOLECULAR: NEGATIVE
KETONES UR QL STRIP: NEGATIVE
LACTATE SERPL-SCNC: 0.9 MMOL/L (ref 0.5–2.2)
LEUKOCYTE ESTERASE UR QL STRIP: ABNORMAL
LYMPHOCYTES # BLD AUTO: 0.1 K/UL (ref 1–4.8)
LYMPHOCYTES NFR BLD: 2.3 % (ref 18–48)
MAGNESIUM SERPL-MCNC: 1.7 MG/DL (ref 1.6–2.6)
MCH RBC QN AUTO: 27.9 PG (ref 27–31)
MCHC RBC AUTO-ENTMCNC: 32.1 G/DL (ref 32–36)
MCV RBC AUTO: 87 FL (ref 82–98)
MICROSCOPIC COMMENT: ABNORMAL
MONOCYTES # BLD AUTO: 0.3 K/UL (ref 0.3–1)
MONOCYTES NFR BLD: 4.7 % (ref 4–15)
NEUTROPHILS # BLD AUTO: 5.5 K/UL (ref 1.8–7.7)
NEUTROPHILS NFR BLD: 91.5 % (ref 38–73)
NITRITE UR QL STRIP: NEGATIVE
NRBC BLD-RTO: 0 /100 WBC
OTHER ELEMENTS URNS MICRO: ABNORMAL
PH UR STRIP: 6 [PH] (ref 5–8)
PLATELET # BLD AUTO: 162 K/UL (ref 150–350)
PMV BLD AUTO: 11 FL (ref 9.2–12.9)
POTASSIUM SERPL-SCNC: 2.7 MMOL/L (ref 3.5–5.1)
PROT SERPL-MCNC: 7.2 G/DL (ref 6–8.4)
PROT UR QL STRIP: NEGATIVE
RBC # BLD AUTO: 3.91 M/UL (ref 4–5.4)
RBC #/AREA URNS AUTO: 1 /HPF (ref 0–4)
SARS-COV-2 RDRP RESP QL NAA+PROBE: NEGATIVE
SODIUM SERPL-SCNC: 144 MMOL/L (ref 136–145)
SP GR UR STRIP: 1.02 (ref 1–1.03)
SPECIMEN SOURCE: NORMAL
SQUAMOUS #/AREA URNS AUTO: 0 /HPF
TROPONIN I SERPL DL<=0.01 NG/ML-MCNC: 0.01 NG/ML (ref 0–0.03)
URN SPEC COLLECT METH UR: ABNORMAL
UROBILINOGEN UR STRIP-ACNC: NEGATIVE EU/DL
WBC # BLD AUTO: 5.96 K/UL (ref 3.9–12.7)
WBC #/AREA URNS AUTO: >100 /HPF (ref 0–5)

## 2020-10-07 PROCEDURE — 63600175 PHARM REV CODE 636 W HCPCS: Mod: ER | Performed by: EMERGENCY MEDICINE

## 2020-10-07 PROCEDURE — U0002 COVID-19 LAB TEST NON-CDC: HCPCS | Mod: ER

## 2020-10-07 PROCEDURE — 81000 URINALYSIS NONAUTO W/SCOPE: CPT | Mod: ER

## 2020-10-07 PROCEDURE — 99285 EMERGENCY DEPT VISIT HI MDM: CPT | Mod: 25,ER

## 2020-10-07 PROCEDURE — 85025 COMPLETE CBC W/AUTO DIFF WBC: CPT | Mod: ER

## 2020-10-07 PROCEDURE — 83880 ASSAY OF NATRIURETIC PEPTIDE: CPT | Mod: ER

## 2020-10-07 PROCEDURE — 83735 ASSAY OF MAGNESIUM: CPT | Mod: ER

## 2020-10-07 PROCEDURE — 83605 ASSAY OF LACTIC ACID: CPT | Mod: ER

## 2020-10-07 PROCEDURE — 80053 COMPREHEN METABOLIC PANEL: CPT | Mod: ER

## 2020-10-07 PROCEDURE — 93005 ELECTROCARDIOGRAM TRACING: CPT | Mod: ER

## 2020-10-07 PROCEDURE — 87502 INFLUENZA DNA AMP PROBE: CPT | Mod: ER

## 2020-10-07 PROCEDURE — 96365 THER/PROPH/DIAG IV INF INIT: CPT | Mod: ER

## 2020-10-07 PROCEDURE — 84484 ASSAY OF TROPONIN QUANT: CPT | Mod: ER

## 2020-10-07 PROCEDURE — 93010 EKG 12-LEAD: ICD-10-PCS | Mod: ,,, | Performed by: INTERNAL MEDICINE

## 2020-10-07 PROCEDURE — 25000003 PHARM REV CODE 250: Mod: ER | Performed by: EMERGENCY MEDICINE

## 2020-10-07 PROCEDURE — 93010 ELECTROCARDIOGRAM REPORT: CPT | Mod: ,,, | Performed by: INTERNAL MEDICINE

## 2020-10-07 PROCEDURE — 87086 URINE CULTURE/COLONY COUNT: CPT

## 2020-10-07 RX ORDER — ACETAMINOPHEN 500 MG
1000 TABLET ORAL
Status: COMPLETED | OUTPATIENT
Start: 2020-10-07 | End: 2020-10-07

## 2020-10-07 RX ORDER — ONDANSETRON 4 MG/1
4 TABLET, ORALLY DISINTEGRATING ORAL
Status: COMPLETED | OUTPATIENT
Start: 2020-10-07 | End: 2020-10-07

## 2020-10-07 RX ORDER — POTASSIUM CHLORIDE 1.5 G/1.58G
40 POWDER, FOR SOLUTION ORAL
Status: COMPLETED | OUTPATIENT
Start: 2020-10-07 | End: 2020-10-07

## 2020-10-07 RX ORDER — CEFDINIR 300 MG/1
300 CAPSULE ORAL 2 TIMES DAILY
Qty: 20 CAPSULE | Refills: 0 | Status: SHIPPED | OUTPATIENT
Start: 2020-10-07 | End: 2020-10-17

## 2020-10-07 RX ORDER — POTASSIUM CHLORIDE 7.45 MG/ML
10 INJECTION INTRAVENOUS
Status: COMPLETED | OUTPATIENT
Start: 2020-10-07 | End: 2020-10-08

## 2020-10-07 RX ORDER — ONDANSETRON 4 MG/1
4 TABLET, ORALLY DISINTEGRATING ORAL EVERY 8 HOURS PRN
Qty: 11 TABLET | Refills: 0 | Status: SHIPPED | OUTPATIENT
Start: 2020-10-07 | End: 2020-10-10

## 2020-10-07 RX ADMIN — ACETAMINOPHEN 1000 MG: 500 TABLET ORAL at 10:10

## 2020-10-07 RX ADMIN — POTASSIUM CHLORIDE 10 MEQ: 7.46 INJECTION, SOLUTION INTRAVENOUS at 10:10

## 2020-10-07 RX ADMIN — POTASSIUM CHLORIDE 40 MEQ: 1.5 POWDER, FOR SOLUTION ORAL at 10:10

## 2020-10-07 RX ADMIN — SODIUM CHLORIDE, SODIUM LACTATE, POTASSIUM CHLORIDE, AND CALCIUM CHLORIDE 1000 ML: .6; .31; .03; .02 INJECTION, SOLUTION INTRAVENOUS at 10:10

## 2020-10-07 RX ADMIN — ONDANSETRON 4 MG: 4 TABLET, ORALLY DISINTEGRATING ORAL at 08:10

## 2020-10-08 VITALS
DIASTOLIC BLOOD PRESSURE: 60 MMHG | WEIGHT: 161.19 LBS | OXYGEN SATURATION: 96 % | RESPIRATION RATE: 23 BRPM | SYSTOLIC BLOOD PRESSURE: 117 MMHG | BODY MASS INDEX: 26.85 KG/M2 | TEMPERATURE: 100 F | HEIGHT: 65 IN | HEART RATE: 81 BPM

## 2020-10-08 NOTE — ED PROVIDER NOTES
Encounter Date: 10/7/2020       History     Chief Complaint   Patient presents with    Nausea     c/o nausea, headache after taking flu shot today     Severe 70-year-old female with past medical history of hypertension, hyperlipidemia, GERD, depression, diverticulitis presents to the emergency department with complaints of nausea which started this morning, is constant, moderate severity.  There is no associated vomiting.  Patient says this started after receiving her influenza shot this morning.  Patient additionally has been having frontal headache, chills, and fever at home.  She took ibuprofen at 3:00 p.m. with no improvement in her symptoms.  Patient denies any chest pain, shortness of breath, abdominal pain, dysuria, diarrhea, constipation.        Review of patient's allergies indicates:   Allergen Reactions    Amitriptyline     Augmentin [amoxicillin-pot clavulanate]     Sulfa (sulfonamide antibiotics)      Past Medical History:   Diagnosis Date    Chronic headaches     Depression     Diverticulitis     GERD (gastroesophageal reflux disease)     Hypercholesteremia     Hypertension     Migraine headache      Past Surgical History:   Procedure Laterality Date    ABDOMINAL SURGERY      APPENDECTOMY      CATARACT EXTRACTION      EYE SURGERY      HYSTERECTOMY      TONSILLECTOMY       History reviewed. No pertinent family history.  Social History     Tobacco Use    Smoking status: Former Smoker    Smokeless tobacco: Never Used   Substance Use Topics    Alcohol use: No    Drug use: No     Review of Systems   Constitutional: Positive for chills and fever. Negative for diaphoresis.   HENT: Negative for congestion, dental problem, ear pain, rhinorrhea and sore throat.    Eyes: Negative for pain and visual disturbance.   Respiratory: Negative for cough and shortness of breath.    Cardiovascular: Negative for chest pain and palpitations.   Gastrointestinal: Positive for nausea. Negative for abdominal  pain, diarrhea and vomiting.   Genitourinary: Negative for dysuria and flank pain.   Musculoskeletal: Negative for back pain and neck pain.   Skin: Negative for rash and wound.   Neurological: Positive for headaches. Negative for weakness and numbness.   Psychiatric/Behavioral: Negative for agitation and confusion.       Physical Exam     Initial Vitals [10/07/20 2039]   BP Pulse Resp Temp SpO2   130/74 (!) 111 20 (!) 100.4 °F (38 °C) 98 %      MAP       --         Physical Exam    Constitutional: She appears well-developed and well-nourished.   HENT:   Head: Normocephalic and atraumatic.   Eyes: EOM are normal. Pupils are equal, round, and reactive to light.   Neck: Normal range of motion. Neck supple.   Cardiovascular: Regular rhythm.   Tachycardia   Pulmonary/Chest: Breath sounds normal. No respiratory distress.   Abdominal: She exhibits no distension. There is no abdominal tenderness.   Neurological: She is alert and oriented to person, place, and time.   Skin: Skin is warm and dry.   Psychiatric: She has a normal mood and affect.         ED Course   Procedures  Labs Reviewed   COMPREHENSIVE METABOLIC PANEL - Abnormal; Notable for the following components:       Result Value    Potassium 2.7 (*)     Alkaline Phosphatase 146 (*)     ALT 8 (*)     All other components within normal limits    Narrative:     K critical result(s) called and verbal readback obtained from Jose L Purvis RN by REZA 10/07/2020 21:51   CBC W/ AUTO DIFFERENTIAL - Abnormal; Notable for the following components:    RBC 3.91 (*)     Hemoglobin 10.9 (*)     Hematocrit 34.0 (*)     RDW 15.0 (*)     Lymph # 0.1 (*)     Gran% 91.5 (*)     Lymph% 2.3 (*)     All other components within normal limits   B-TYPE NATRIURETIC PEPTIDE - Abnormal; Notable for the following components:     (*)     All other components within normal limits   URINALYSIS, REFLEX TO URINE CULTURE - Abnormal; Notable for the following components:    Occult Blood UA  Trace (*)     Leukocytes, UA 3+ (*)     All other components within normal limits    Narrative:     Specimen Source->Urine   URINALYSIS MICROSCOPIC - Abnormal; Notable for the following components:    WBC, UA >100 (*)     Bacteria Many (*)     All other components within normal limits    Narrative:     Specimen Source->Urine   INFLUENZA A & B BY MOLECULAR   CULTURE, URINE   SARS-COV-2 RNA AMPLIFICATION, QUAL   TROPONIN I   LACTIC ACID, PLASMA   MAGNESIUM     EKG Readings: (Independently Interpreted)   Rate of 103 beats per minute.  Sinus tachycardia.  Normal axis.  P.r., QRS and QTC within normal limits.  No STEMI.  There is diffuse ST-T abnormality which is similar to previous EKGs.       Imaging Results          X-Ray Chest AP Portable (Final result)  Result time 10/07/20 21:35:56    Final result by Andrea Rainey MD (10/07/20 21:35:56)                 Impression:      No acute abnormality.      Electronically signed by: Andrea Rainey  Date:    10/07/2020  Time:    21:35             Narrative:    EXAMINATION:  XR CHEST AP PORTABLE    CLINICAL HISTORY:  Nausea;    TECHNIQUE:  Single frontal view of the chest was performed.    COMPARISON:  12/09/2015    FINDINGS:  The lungs are clear, with normal appearance of pulmonary vasculature and no pleural effusion or pneumothorax.    The cardiac silhouette is normal in size. The hilar and mediastinal contours are unremarkable.    Bones are intact.                                                   ED Course as of Oct 07 2323   Wed Oct 07, 2020   2213 Patient with UTI. No sepsis today.     [BA]   2323 11:23 PM Reassessment: I reassessed the pt.  The pt is resting comfortably and is NAD.  Pt states their sx have improved. I Discussed test results, shared treatment plan, specific conditions for return, and the need for f/u. I  Answered their questions at this time.  Pt understands and agrees to the plan.  The pt has remained hemodynamically stable through ED course and is  stable for discharge.       [BA]      ED Course User Index  [BA] Jet Aggarwal MD            Clinical Impression:       ICD-10-CM ICD-9-CM   1. Hypokalemia  E87.6 276.8   2. Nausea  R11.0 787.02   3. Pyelonephritis  N12 590.80                          ED Disposition Condition    Discharge Stable        ED Prescriptions     Medication Sig Dispense Start Date End Date Auth. Provider    cefdinir (OMNICEF) 300 MG capsule Take 1 capsule (300 mg total) by mouth 2 (two) times daily. for 10 days 20 capsule 10/7/2020 10/17/2020 Jet Aggarwal MD    ondansetron (ZOFRAN-ODT) 4 MG TbDL Take 1 tablet (4 mg total) by mouth every 8 (eight) hours as needed. 11 tablet 10/7/2020 10/10/2020 Jet Aggarwal MD        Follow-up Information     Follow up With Specialties Details Why Contact Info    Santos Sharma MD Internal Medicine Schedule an appointment as soon as possible for a visit in 2 days For re-evaluation and further treatment [id number] [facility] [address]  [place] [address]  [phone]      [facility] Emergency Medicine Go today If symptoms worsen, For re-evaluation and further treatment, As needed [id number] [facility] [address]  [place] [id number] [phone]                                       Jet Aggarwal MD  10/07/20 1068

## 2020-10-09 LAB — BACTERIA UR CULT: NO GROWTH

## 2020-12-16 ENCOUNTER — LAB VISIT (OUTPATIENT)
Dept: LAB | Facility: HOSPITAL | Age: 70
End: 2020-12-16
Payer: COMMERCIAL

## 2020-12-16 DIAGNOSIS — N39.0 URINARY TRACT INFECTION, SITE NOT SPECIFIED: Primary | ICD-10-CM

## 2020-12-16 LAB
BACTERIA #/AREA URNS AUTO: ABNORMAL /HPF
BILIRUB UR QL STRIP: NEGATIVE
CLARITY UR REFRACT.AUTO: ABNORMAL
COLOR UR AUTO: YELLOW
GLUCOSE UR QL STRIP: NEGATIVE
HGB UR QL STRIP: NEGATIVE
KETONES UR QL STRIP: NEGATIVE
LEUKOCYTE ESTERASE UR QL STRIP: ABNORMAL
MICROSCOPIC COMMENT: ABNORMAL
NITRITE UR QL STRIP: POSITIVE
PH UR STRIP: >8 [PH] (ref 5–8)
PROT UR QL STRIP: NEGATIVE
RBC #/AREA URNS AUTO: 0 /HPF (ref 0–4)
SP GR UR STRIP: 1.01 (ref 1–1.03)
SQUAMOUS #/AREA URNS AUTO: 2 /HPF
URN SPEC COLLECT METH UR: ABNORMAL
UROBILINOGEN UR STRIP-ACNC: NEGATIVE EU/DL
WBC #/AREA URNS AUTO: 20 /HPF (ref 0–5)
WBC CLUMPS UR QL AUTO: ABNORMAL

## 2020-12-16 PROCEDURE — 87077 CULTURE AEROBIC IDENTIFY: CPT

## 2020-12-16 PROCEDURE — 87086 URINE CULTURE/COLONY COUNT: CPT

## 2020-12-16 PROCEDURE — 81000 URINALYSIS NONAUTO W/SCOPE: CPT | Mod: PO

## 2020-12-16 PROCEDURE — 87186 SC STD MICRODIL/AGAR DIL: CPT

## 2020-12-16 PROCEDURE — 87088 URINE BACTERIA CULTURE: CPT

## 2020-12-18 LAB — BACTERIA UR CULT: ABNORMAL

## 2022-04-29 ENCOUNTER — LAB VISIT (OUTPATIENT)
Dept: LAB | Facility: HOSPITAL | Age: 72
End: 2022-04-29
Attending: UROLOGY
Payer: MEDICARE

## 2022-04-29 DIAGNOSIS — N39.0 URINARY TRACT INFECTION, SITE NOT SPECIFIED: Primary | ICD-10-CM

## 2022-04-29 DIAGNOSIS — N39.0 URINARY TRACT INFECTION, SITE NOT SPECIFIED: ICD-10-CM

## 2022-04-29 LAB
BILIRUB UR QL STRIP: ABNORMAL
CLARITY UR REFRACT.AUTO: CLEAR
COLOR UR AUTO: ABNORMAL
GLUCOSE UR QL STRIP: NEGATIVE
HGB UR QL STRIP: NEGATIVE
KETONES UR QL STRIP: NEGATIVE
LEUKOCYTE ESTERASE UR QL STRIP: NEGATIVE
NITRITE UR QL STRIP: NEGATIVE
PH UR STRIP: 6 [PH] (ref 5–8)
PROT UR QL STRIP: ABNORMAL
SP GR UR STRIP: 1.02 (ref 1–1.03)
URN SPEC COLLECT METH UR: ABNORMAL
UROBILINOGEN UR STRIP-ACNC: NEGATIVE EU/DL

## 2022-04-29 PROCEDURE — 81003 URINALYSIS AUTO W/O SCOPE: CPT | Mod: PO | Performed by: UROLOGY

## 2022-04-29 PROCEDURE — 87086 URINE CULTURE/COLONY COUNT: CPT | Performed by: UROLOGY

## 2022-05-01 LAB — BACTERIA UR CULT: NO GROWTH

## 2022-09-15 ENCOUNTER — HOSPITAL ENCOUNTER (EMERGENCY)
Facility: HOSPITAL | Age: 72
Discharge: HOME | End: 2022-09-15
Attending: EMERGENCY MEDICINE
Payer: MEDICARE

## 2022-09-15 VITALS
TEMPERATURE: 98 F | OXYGEN SATURATION: 98 % | HEART RATE: 76 BPM | DIASTOLIC BLOOD PRESSURE: 89 MMHG | HEIGHT: 65 IN | BODY MASS INDEX: 27.56 KG/M2 | SYSTOLIC BLOOD PRESSURE: 164 MMHG | RESPIRATION RATE: 16 BRPM | WEIGHT: 165.38 LBS

## 2022-09-15 DIAGNOSIS — S00.03XA CONTUSION OF SCALP, INITIAL ENCOUNTER: Primary | ICD-10-CM

## 2022-09-15 PROCEDURE — 25000003 PHARM REV CODE 250: Mod: ER | Performed by: EMERGENCY MEDICINE

## 2022-09-15 PROCEDURE — 99283 EMERGENCY DEPT VISIT LOW MDM: CPT | Mod: ER

## 2022-09-15 RX ORDER — ACETAMINOPHEN 325 MG/1
650 TABLET ORAL
Status: COMPLETED | OUTPATIENT
Start: 2022-09-15 | End: 2022-09-15

## 2022-09-15 RX ADMIN — ACETAMINOPHEN 650 MG: 325 TABLET ORAL at 10:09

## 2022-09-16 NOTE — ED PROVIDER NOTES
"Encounter Date: 9/15/2022       History     Chief Complaint   Patient presents with    Head Injury     Pt fell while dancing at [employer] when her knee gave out on her. Hit her head. No LOC. Does not take a blood thinner.      Chief complaint: left-sided head injury    History of present illness: 71 y/o female was dancing near band stage when she fell and struck the left side of her head on amplifier of the band. No LOC. Immediately arose and with no c/o neck pain or felling "dazed". Location of injury is the left parietal scalp. Pt noted a "bump" in the area. No aggravating or mitigating factors. No trouble walking or talking at scene. No meds taken prior to arrival. No visual complaints.     Review of patient's allergies indicates:   Allergen Reactions    Amitriptyline     Augmentin [amoxicillin-pot clavulanate]     Sulfa (sulfonamide antibiotics)      Past Medical History:   Diagnosis Date    Chronic headaches     Depression     Diverticulitis     GERD (gastroesophageal reflux disease)     Hypercholesteremia     Hypertension     Migraine headache      Past Surgical History:   Procedure Laterality Date    ABDOMINAL SURGERY      APPENDECTOMY      CATARACT EXTRACTION      EYE SURGERY      HYSTERECTOMY      TONSILLECTOMY       History reviewed. No pertinent family history.  Social History     Tobacco Use    Smoking status: Former    Smokeless tobacco: Never   Substance Use Topics    Alcohol use: No    Drug use: No     Review of Systems   Constitutional:  Negative for activity change.   HENT:  Negative for ear pain, facial swelling and rhinorrhea.    Eyes:  Negative for pain and visual disturbance.   Respiratory:  Negative for shortness of breath.    Cardiovascular:  Negative for chest pain.   Gastrointestinal:  Negative for nausea and vomiting.   Musculoskeletal:  Negative for gait problem.   Skin:         Swelling left parietal scalp   Neurological:  Positive for headaches. Negative for dizziness, syncope, facial " asymmetry, speech difficulty, weakness, light-headedness and numbness.   Psychiatric/Behavioral: Negative.     All other systems reviewed and are negative.    Physical Exam     Initial Vitals [09/15/22 2035]   BP Pulse Resp Temp SpO2   (!) 164/89 76 16 98 °F (36.7 °C) 98 %      MAP       --         Physical Exam    Constitutional: She appears well-developed and well-nourished. No distress.   HENT:   Head: Atraumatic.   Right Ear: External ear normal.   Left Ear: External ear normal.   Nose: Nose normal.   [place]-sized swelling to left parietal scalp suggestive of small hematoma.   Eyes: Conjunctivae and EOM are normal. Pupils are equal, round, and reactive to light.   Neck: Neck supple.   Normal range of motion.  Cardiovascular:  Normal rate, regular rhythm and intact distal pulses.           Pulmonary/Chest: Breath sounds normal. No respiratory distress.   Abdominal: Abdomen is soft. She exhibits no distension. There is no abdominal tenderness.   Musculoskeletal:         General: Normal range of motion.      Cervical back: Normal range of motion and neck supple.     Neurological: She is alert and oriented to person, place, and time. She has normal strength. GCS score is 15. GCS eye subscore is 4. GCS verbal subscore is 5. GCS motor subscore is 6.   Skin: Skin is warm and dry. Capillary refill takes less than 2 seconds. No rash noted. No erythema.   Psychiatric: She has a normal mood and affect. Her behavior is normal. Judgment and thought content normal.       ED Course   Procedures  Labs Reviewed - No data to display       Imaging Results    None          Medications   acetaminophen tablet 650 mg (650 mg Oral Given 9/15/22 2202)     Medical Decision Making:   ED Management:  Historically and clinically no imaging indicated as injury was mild and no neurologic sequelae and no temporal impact.  Pt and spouse are comfortable with no CGT and advised on reasons to return if any concerns.                        Clinical  Impression:   Final diagnoses:  [S00.03XA] Contusion of scalp, initial encounter (Primary)        ED Disposition Condition    Discharge Stable          ED Prescriptions    None       Follow-up Information       Follow up With Specialties Details Why Contact Info    Santos Sharma MD Internal Medicine  As needed [id number] [facility]  [place] [address]  [phone]      [facility] Emergency Dept Emergency Medicine  If symptoms worsen [address]  [place] [id number] [phone]             Teodoro Sigala MD  09/16/22 1405       Teodoro Sigala MD  09/16/22 140

## 2023-06-12 ENCOUNTER — LAB VISIT (OUTPATIENT)
Dept: LAB | Facility: HOSPITAL | Age: 73
End: 2023-06-12
Attending: UROLOGY
Payer: MEDICARE

## 2023-06-12 DIAGNOSIS — N39.0 URINARY TRACT INFECTION, SITE NOT SPECIFIED: ICD-10-CM

## 2023-06-12 LAB
BILIRUB UR QL STRIP: NEGATIVE
CLARITY UR REFRACT.AUTO: CLEAR
COLOR UR AUTO: YELLOW
GLUCOSE UR QL STRIP: NEGATIVE
HGB UR QL STRIP: NEGATIVE
KETONES UR QL STRIP: NEGATIVE
LEUKOCYTE ESTERASE UR QL STRIP: ABNORMAL
MICROSCOPIC COMMENT: NORMAL
NITRITE UR QL STRIP: NEGATIVE
PH UR STRIP: 6 [PH] (ref 5–8)
PROT UR QL STRIP: NEGATIVE
SP GR UR STRIP: 1.02 (ref 1–1.03)
URN SPEC COLLECT METH UR: ABNORMAL
UROBILINOGEN UR STRIP-ACNC: <2 EU/DL
WBC #/AREA URNS AUTO: 2 /HPF (ref 0–5)

## 2023-06-12 PROCEDURE — 81000 URINALYSIS NONAUTO W/SCOPE: CPT | Mod: PO | Performed by: UROLOGY

## 2023-06-12 PROCEDURE — 87086 URINE CULTURE/COLONY COUNT: CPT | Performed by: UROLOGY

## 2023-06-14 LAB — BACTERIA UR CULT: NORMAL

## 2023-10-09 ENCOUNTER — OFFICE VISIT (OUTPATIENT)
Dept: INTERNAL MEDICINE | Facility: CLINIC | Age: 73
End: 2023-10-09
Payer: MEDICARE

## 2023-10-09 VITALS
WEIGHT: 149.06 LBS | HEIGHT: 65 IN | BODY MASS INDEX: 24.83 KG/M2 | TEMPERATURE: 98 F | SYSTOLIC BLOOD PRESSURE: 100 MMHG | OXYGEN SATURATION: 96 % | HEART RATE: 86 BPM | DIASTOLIC BLOOD PRESSURE: 64 MMHG

## 2023-10-09 DIAGNOSIS — J01.00 ACUTE NON-RECURRENT MAXILLARY SINUSITIS: Primary | ICD-10-CM

## 2023-10-09 PROBLEM — G43.909 MIGRAINE: Status: ACTIVE | Noted: 2023-10-09

## 2023-10-09 PROCEDURE — 96372 THER/PROPH/DIAG INJ SC/IM: CPT | Mod: S$GLB,,, | Performed by: NURSE PRACTITIONER

## 2023-10-09 PROCEDURE — 1126F AMNT PAIN NOTED NONE PRSNT: CPT | Mod: CPTII,S$GLB,, | Performed by: NURSE PRACTITIONER

## 2023-10-09 PROCEDURE — 3074F PR MOST RECENT SYSTOLIC BLOOD PRESSURE < 130 MM HG: ICD-10-PCS | Mod: CPTII,S$GLB,, | Performed by: NURSE PRACTITIONER

## 2023-10-09 PROCEDURE — 1126F PR PAIN SEVERITY QUANTIFIED, NO PAIN PRESENT: ICD-10-PCS | Mod: CPTII,S$GLB,, | Performed by: NURSE PRACTITIONER

## 2023-10-09 PROCEDURE — 99999 PR PBB SHADOW E&M-EST. PATIENT-LVL IV: ICD-10-PCS | Mod: PBBFAC,,, | Performed by: NURSE PRACTITIONER

## 2023-10-09 PROCEDURE — 3008F PR BODY MASS INDEX (BMI) DOCUMENTED: ICD-10-PCS | Mod: CPTII,S$GLB,, | Performed by: NURSE PRACTITIONER

## 2023-10-09 PROCEDURE — 3074F SYST BP LT 130 MM HG: CPT | Mod: CPTII,S$GLB,, | Performed by: NURSE PRACTITIONER

## 2023-10-09 PROCEDURE — 1159F PR MEDICATION LIST DOCUMENTED IN MEDICAL RECORD: ICD-10-PCS | Mod: CPTII,S$GLB,, | Performed by: NURSE PRACTITIONER

## 2023-10-09 PROCEDURE — 3078F DIAST BP <80 MM HG: CPT | Mod: CPTII,S$GLB,, | Performed by: NURSE PRACTITIONER

## 2023-10-09 PROCEDURE — 96372 PR INJECTION,THERAP/PROPH/DIAG2ST, IM OR SUBCUT: ICD-10-PCS | Mod: S$GLB,,, | Performed by: NURSE PRACTITIONER

## 2023-10-09 PROCEDURE — 99203 PR OFFICE/OUTPT VISIT, NEW, LEVL III, 30-44 MIN: ICD-10-PCS | Mod: 25,S$GLB,, | Performed by: NURSE PRACTITIONER

## 2023-10-09 PROCEDURE — 99999 PR PBB SHADOW E&M-EST. PATIENT-LVL IV: CPT | Mod: PBBFAC,,, | Performed by: NURSE PRACTITIONER

## 2023-10-09 PROCEDURE — 99203 OFFICE O/P NEW LOW 30 MIN: CPT | Mod: 25,S$GLB,, | Performed by: NURSE PRACTITIONER

## 2023-10-09 PROCEDURE — 3078F PR MOST RECENT DIASTOLIC BLOOD PRESSURE < 80 MM HG: ICD-10-PCS | Mod: CPTII,S$GLB,, | Performed by: NURSE PRACTITIONER

## 2023-10-09 PROCEDURE — 1159F MED LIST DOCD IN RCRD: CPT | Mod: CPTII,S$GLB,, | Performed by: NURSE PRACTITIONER

## 2023-10-09 PROCEDURE — 3008F BODY MASS INDEX DOCD: CPT | Mod: CPTII,S$GLB,, | Performed by: NURSE PRACTITIONER

## 2023-10-09 RX ORDER — AMLODIPINE BESYLATE 10 MG/1
1 TABLET ORAL EVERY MORNING
COMMUNITY
Start: 2023-05-15

## 2023-10-09 RX ORDER — AZITHROMYCIN 250 MG/1
TABLET, FILM COATED ORAL
Qty: 6 TABLET | Refills: 0 | Status: SHIPPED | OUTPATIENT
Start: 2023-10-09 | End: 2023-10-14

## 2023-10-09 RX ORDER — BETAMETHASONE SODIUM PHOSPHATE AND BETAMETHASONE ACETATE 3; 3 MG/ML; MG/ML
6 INJECTION, SUSPENSION INTRA-ARTICULAR; INTRALESIONAL; INTRAMUSCULAR; SOFT TISSUE
Status: COMPLETED | OUTPATIENT
Start: 2023-10-09 | End: 2023-10-09

## 2023-10-09 RX ORDER — IRON,CARB/VIT C/VIT B12/FOLIC 100-250-1
1 TABLET ORAL EVERY MORNING
COMMUNITY
Start: 2023-09-20

## 2023-10-09 RX ORDER — ONDANSETRON 4 MG/1
4 TABLET, FILM COATED ORAL EVERY 8 HOURS PRN
COMMUNITY
Start: 2023-10-08 | End: 2023-10-09

## 2023-10-09 RX ORDER — ALENDRONATE SODIUM 70 MG/1
70 TABLET ORAL
COMMUNITY
Start: 2023-09-20

## 2023-10-09 RX ORDER — PROMETHAZINE HYDROCHLORIDE AND DEXTROMETHORPHAN HYDROBROMIDE 6.25; 15 MG/5ML; MG/5ML
5 SYRUP ORAL EVERY 4 HOURS PRN
Qty: 240 ML | Refills: 0 | Status: SHIPPED | OUTPATIENT
Start: 2023-10-09 | End: 2023-10-19

## 2023-10-09 RX ADMIN — BETAMETHASONE SODIUM PHOSPHATE AND BETAMETHASONE ACETATE 6 MG: 3; 3 INJECTION, SUSPENSION INTRA-ARTICULAR; INTRALESIONAL; INTRAMUSCULAR; SOFT TISSUE at 03:10

## 2023-10-09 NOTE — PROGRESS NOTES
Subjective:       Patient ID: Gaby Stanley is a 73 y.o. female.    Chief Complaint: URI    Mrs. Stanley presents to clinic for sinus congestion and cough x 7 days.  She seen at urgent care 3 days prior tested negative for flu, COVID, and strep.  Received prescription for promethazine DM.  Today reports no improvement in symptoms.  Unable to sleep at night due to cough.  Denies fever, shortness of breath, or chest pain.  Not taking any over-the-counter medication for symptoms.       Sinus Problem  This is a new problem. The current episode started in the past 7 days. The problem has been gradually worsening since onset. There has been no fever. Associated symptoms include chills (intermittent), congestion, coughing, headaches (chronic), a hoarse voice, sinus pressure, sneezing and a sore throat. Pertinent negatives include no ear pain, shortness of breath or swollen glands.       Patient Active Problem List   Diagnosis    Anxiety    Migraine    Hyperlipidemia LDL goal <130    Hypertension    Pernicious anemia       History reviewed. No pertinent family history.  Past Surgical History:   Procedure Laterality Date    ABDOMINAL SURGERY      APPENDECTOMY      CATARACT EXTRACTION      EYE SURGERY      HYSTERECTOMY      TONSILLECTOMY           Current Outpatient Medications:     alendronate (FOSAMAX) 70 MG tablet, Take 70 mg by mouth every 7 days., Disp: , Rfl:     amLODIPine (NORVASC) 10 MG tablet, Take 1 tablet by mouth every morning., Disp: , Rfl:     iron-vit c-b12-folic acid (ICAR-C PLUS) Tab, Take 1 tablet by mouth every morning., Disp: , Rfl:     lipase-protease-amylase (ZENPEP) 25,000-85,000- 136,000 unit CpDR, Take by mouth before meals and at bedtime as needed., Disp: , Rfl:     pantoprazole (PROTONIX) 40 MG tablet, Take 40 mg by mouth 2 (two) times daily. , Disp: , Rfl:     promethazine (PHENERGAN) 25 MG tablet, Take 0.5 tablets (12.5 mg total) by mouth every 6 (six) hours as needed for Nausea., Disp: 15  "tablet, Rfl: 0    ROSUVASTATIN CALCIUM (CRESTOR ORAL), Take by mouth., Disp: , Rfl:     sertraline (ZOLOFT) 100 MG tablet, Take 100 mg by mouth once daily., Disp: , Rfl:     SUMATRIPTAN SUCCINATE (IMITREX ORAL), Take by mouth as needed. , Disp: , Rfl:     azithromycin (Z-FELY) 250 MG tablet, Take 2 tablets by mouth on day 1; Take 1 tablet by mouth on days 2-5, Disp: 6 tablet, Rfl: 0    loratadine (CLARITIN) 10 mg tablet, Take 10 mg by mouth once daily at 6am., Disp: , Rfl:     promethazine-dextromethorphan (PROMETHAZINE-DM) 6.25-15 mg/5 mL Syrp, Take 5 mLs by mouth every 4 (four) hours as needed (cough)., Disp: 240 mL, Rfl: 0  No current facility-administered medications for this visit.    Review of Systems   Constitutional:  Positive for chills (intermittent). Negative for fever.   HENT:  Positive for congestion, hoarse voice, postnasal drip, sinus pressure, sneezing, sore throat and voice change. Negative for ear pain and trouble swallowing.    Respiratory:  Positive for cough. Negative for shortness of breath.    Cardiovascular:  Negative for chest pain.   Musculoskeletal:  Negative for myalgias.   Neurological:  Positive for headaches (chronic).       Objective:   /64 (BP Location: Left arm, Patient Position: Sitting, BP Method: Medium (Manual))   Pulse 86   Temp 98.1 °F (36.7 °C)   Ht 5' 5" (1.651 m)   Wt 67.6 kg (149 lb 0.5 oz)   SpO2 96%   BMI 24.80 kg/m²      Physical Exam  Vitals reviewed.   Constitutional:       Appearance: Normal appearance. She is ill-appearing (mild).   HENT:      Head: Normocephalic and atraumatic.      Right Ear: Tympanic membrane and ear canal normal. No middle ear effusion. Tympanic membrane is not erythematous or bulging.      Left Ear: Tympanic membrane and ear canal normal.  No middle ear effusion. Tympanic membrane is not erythematous or bulging.      Nose: Mucosal edema and congestion present. No rhinorrhea.      Right Sinus: Maxillary sinus tenderness (mild) " present.      Left Sinus: Maxillary sinus tenderness (mild) present.      Mouth/Throat:      Mouth: Mucous membranes are moist.      Pharynx: Posterior oropharyngeal erythema (mild) present. No oropharyngeal exudate.   Eyes:      General:         Right eye: No discharge.         Left eye: No discharge.   Cardiovascular:      Rate and Rhythm: Normal rate and regular rhythm.      Heart sounds: Normal heart sounds. No murmur heard.  Pulmonary:      Effort: Pulmonary effort is normal. No respiratory distress.      Breath sounds: Normal breath sounds. No wheezing, rhonchi or rales.      Comments: +frequent cough  Musculoskeletal:      Cervical back: Normal range of motion. No rigidity or tenderness.   Lymphadenopathy:      Cervical: No cervical adenopathy.   Skin:     General: Skin is warm and dry.      Coloration: Skin is not pale.      Findings: No erythema or rash.   Neurological:      Mental Status: She is alert and oriented to person, place, and time.   Psychiatric:         Behavior: Behavior normal.         Assessment & Plan     1. Acute non-recurrent maxillary sinusitis  Comments:  [facility] 6mg IM today in clinic. Zpak rx. Symptomatic care reviewed. Follow up with PCP if symptoms do not improve.  Orders:  -     azithromycin (Z-FELY) 250 MG tablet; Take 2 tablets by mouth on day 1; Take 1 tablet by mouth on days 2-5  Dispense: 6 tablet; Refill: 0  -     betamethasone acetate-betamethasone sodium phosphate injection 6 mg  -     promethazine-dextromethorphan (PROMETHAZINE-DM) 6.25-15 mg/5 mL Syrp; Take 5 mLs by mouth every 4 (four) hours as needed (cough).  Dispense: 240 mL; Refill: 0      Administrations This Visit       betamethasone acetate-betamethasone sodium phosphate injection 6 mg       Admin Date  10/09/2023 Action  Given Dose  6 mg Route  Intramuscular Administered By  Tracey Clemons MA                      Over-the-counter Tylenol/ibuprofen as needed for pain and fever if not contraindicated   Hand  "hygiene   Rest and maintain adequate hydration.   Antihistamine and flonase for nasal congestion and upper respiratory symptoms  Warm salt water gargles, chloraseptic spray, and lozenges as needed for sore throat.        CECILY Mejia      Portions of this note may have been created with voice recognition software. Occasional "wrong-word" or "sound-a-like" substitutions may have occurred due to the inherent limitations of voice recognition software. Please, read the note carefully and recognize, using context, where substitutions have occurred.     "

## 2024-02-16 ENCOUNTER — HOSPITAL ENCOUNTER (INPATIENT)
Facility: HOSPITAL | Age: 74
LOS: 2 days | Discharge: HOME | DRG: 198 | End: 2024-02-19
Attending: EMERGENCY MEDICINE | Admitting: HOSPITALIST
Payer: MEDICARE

## 2024-02-16 DIAGNOSIS — J18.9 PNEUMONIA OF RIGHT UPPER LOBE DUE TO INFECTIOUS ORGANISM: Primary | ICD-10-CM

## 2024-02-16 DIAGNOSIS — J84.9 INTERSTITIAL LUNG DISEASE: ICD-10-CM

## 2024-02-16 DIAGNOSIS — R09.02 HYPOXEMIA: ICD-10-CM

## 2024-02-16 DIAGNOSIS — R05.1 ACUTE COUGH: ICD-10-CM

## 2024-02-16 DIAGNOSIS — R09.02 HYPOXIA: ICD-10-CM

## 2024-02-16 DIAGNOSIS — J18.9 CAP (COMMUNITY ACQUIRED PNEUMONIA): ICD-10-CM

## 2024-02-16 DIAGNOSIS — R06.02 SOB (SHORTNESS OF BREATH): ICD-10-CM

## 2024-02-16 LAB
ALBUMIN SERPL BCP-MCNC: 2.9 G/DL (ref 3.5–5.2)
ALLENS TEST: ABNORMAL
ALP SERPL-CCNC: 130 U/L (ref 55–135)
ALT SERPL W/O P-5'-P-CCNC: 10 U/L (ref 10–44)
ANION GAP SERPL CALC-SCNC: 14 MMOL/L (ref 8–16)
AST SERPL-CCNC: 24 U/L (ref 10–40)
BASOPHILS # BLD AUTO: 0.06 K/UL (ref 0–0.2)
BASOPHILS NFR BLD: 0.5 % (ref 0–1.9)
BILIRUB SERPL-MCNC: 0.3 MG/DL (ref 0.1–1)
BNP SERPL-MCNC: 78 PG/ML (ref 0–99)
BUN SERPL-MCNC: 15 MG/DL (ref 8–23)
CALCIUM SERPL-MCNC: 9 MG/DL (ref 8.7–10.5)
CHLORIDE SERPL-SCNC: 106 MMOL/L (ref 95–110)
CO2 SERPL-SCNC: 21 MMOL/L (ref 23–29)
CREAT SERPL-MCNC: 0.7 MG/DL (ref 0.5–1.4)
CTP QC/QA: YES
CTP QC/QA: YES
D DIMER PPP IA.FEU-MCNC: 1.4 MG/L FEU
DELSYS: ABNORMAL
DIFFERENTIAL METHOD BLD: ABNORMAL
EOSINOPHIL # BLD AUTO: 0.4 K/UL (ref 0–0.5)
EOSINOPHIL NFR BLD: 4 % (ref 0–8)
ERYTHROCYTE [DISTWIDTH] IN BLOOD BY AUTOMATED COUNT: 13.6 % (ref 11.5–14.5)
EST. GFR  (NO RACE VARIABLE): >60 ML/MIN/1.73 M^2
GLUCOSE SERPL-MCNC: 149 MG/DL (ref 70–110)
HCO3 UR-SCNC: 24.9 MMOL/L (ref 24–28)
HCT VFR BLD AUTO: 33.2 % (ref 37–48.5)
HGB BLD-MCNC: 10.9 G/DL (ref 12–16)
IMM GRANULOCYTES # BLD AUTO: 0.06 K/UL (ref 0–0.04)
IMM GRANULOCYTES NFR BLD AUTO: 0.5 % (ref 0–0.5)
LACTATE SERPL-SCNC: 0.9 MMOL/L (ref 0.5–2.2)
LYMPHOCYTES # BLD AUTO: 0.6 K/UL (ref 1–4.8)
LYMPHOCYTES NFR BLD: 5.2 % (ref 18–48)
MCH RBC QN AUTO: 28.5 PG (ref 27–31)
MCHC RBC AUTO-ENTMCNC: 32.8 G/DL (ref 32–36)
MCV RBC AUTO: 87 FL (ref 82–98)
MODE: ABNORMAL
MONOCYTES # BLD AUTO: 0.8 K/UL (ref 0.3–1)
MONOCYTES NFR BLD: 7.2 % (ref 4–15)
NEUTROPHILS # BLD AUTO: 9.1 K/UL (ref 1.8–7.7)
NEUTROPHILS NFR BLD: 82.6 % (ref 38–73)
NRBC BLD-RTO: 0 /100 WBC
PCO2 BLDA: 35.3 MMHG (ref 35–45)
PH SMN: 7.46 [PH] (ref 7.35–7.45)
PLATELET # BLD AUTO: 246 K/UL (ref 150–450)
PMV BLD AUTO: 10.2 FL (ref 9.2–12.9)
PO2 BLDA: 56 MMHG (ref 80–100)
POC BE: 1 MMOL/L
POC MOLECULAR INFLUENZA A AGN: NEGATIVE
POC MOLECULAR INFLUENZA B AGN: NEGATIVE
POC SATURATED O2: 91 % (ref 95–100)
POTASSIUM SERPL-SCNC: 3.5 MMOL/L (ref 3.5–5.1)
PROT SERPL-MCNC: 7 G/DL (ref 6–8.4)
RBC # BLD AUTO: 3.82 M/UL (ref 4–5.4)
SAMPLE: ABNORMAL
SARS-COV-2 RDRP RESP QL NAA+PROBE: NEGATIVE
SITE: ABNORMAL
SODIUM SERPL-SCNC: 141 MMOL/L (ref 136–145)
TROPONIN I SERPL DL<=0.01 NG/ML-MCNC: <0.006 NG/ML (ref 0–0.03)
WBC # BLD AUTO: 11.08 K/UL (ref 3.9–12.7)

## 2024-02-16 PROCEDURE — 93010 ELECTROCARDIOGRAM REPORT: CPT | Mod: ,,, | Performed by: INTERNAL MEDICINE

## 2024-02-16 PROCEDURE — 85025 COMPLETE CBC W/AUTO DIFF WBC: CPT | Mod: ER | Performed by: EMERGENCY MEDICINE

## 2024-02-16 PROCEDURE — 87635 SARS-COV-2 COVID-19 AMP PRB: CPT | Mod: ER | Performed by: EMERGENCY MEDICINE

## 2024-02-16 PROCEDURE — 82803 BLOOD GASES ANY COMBINATION: CPT | Mod: ER

## 2024-02-16 PROCEDURE — 63600175 PHARM REV CODE 636 W HCPCS: Mod: ER | Performed by: EMERGENCY MEDICINE

## 2024-02-16 PROCEDURE — 87040 BLOOD CULTURE FOR BACTERIA: CPT | Mod: 59 | Performed by: EMERGENCY MEDICINE

## 2024-02-16 PROCEDURE — G0378 HOSPITAL OBSERVATION PER HR: HCPCS | Mod: ER

## 2024-02-16 PROCEDURE — 83605 ASSAY OF LACTIC ACID: CPT | Mod: ER | Performed by: EMERGENCY MEDICINE

## 2024-02-16 PROCEDURE — 36600 WITHDRAWAL OF ARTERIAL BLOOD: CPT | Mod: ER

## 2024-02-16 PROCEDURE — 83880 ASSAY OF NATRIURETIC PEPTIDE: CPT | Mod: ER | Performed by: EMERGENCY MEDICINE

## 2024-02-16 PROCEDURE — 96365 THER/PROPH/DIAG IV INF INIT: CPT | Mod: 59,ER

## 2024-02-16 PROCEDURE — 84484 ASSAY OF TROPONIN QUANT: CPT | Mod: ER | Performed by: EMERGENCY MEDICINE

## 2024-02-16 PROCEDURE — 80053 COMPREHEN METABOLIC PANEL: CPT | Mod: ER | Performed by: EMERGENCY MEDICINE

## 2024-02-16 PROCEDURE — 27000221 HC OXYGEN, UP TO 24 HOURS: Mod: ER

## 2024-02-16 PROCEDURE — 99900035 HC TECH TIME PER 15 MIN (STAT): Mod: ER

## 2024-02-16 PROCEDURE — 93005 ELECTROCARDIOGRAM TRACING: CPT | Mod: ER

## 2024-02-16 PROCEDURE — 99285 EMERGENCY DEPT VISIT HI MDM: CPT | Mod: 25,ER

## 2024-02-16 PROCEDURE — 87502 INFLUENZA DNA AMP PROBE: CPT | Mod: ER

## 2024-02-16 PROCEDURE — 85379 FIBRIN DEGRADATION QUANT: CPT | Mod: ER | Performed by: EMERGENCY MEDICINE

## 2024-02-16 PROCEDURE — 82800 BLOOD PH: CPT | Mod: ER

## 2024-02-16 PROCEDURE — 25000003 PHARM REV CODE 250: Mod: ER | Performed by: EMERGENCY MEDICINE

## 2024-02-16 RX ORDER — ONDANSETRON HYDROCHLORIDE 2 MG/ML
4 INJECTION, SOLUTION INTRAVENOUS
Status: COMPLETED | OUTPATIENT
Start: 2024-02-16 | End: 2024-02-16

## 2024-02-16 RX ORDER — IBUPROFEN 200 MG
600 TABLET ORAL
Status: COMPLETED | OUTPATIENT
Start: 2024-02-16 | End: 2024-02-16

## 2024-02-16 RX ORDER — SODIUM CHLORIDE, SODIUM LACTATE, POTASSIUM CHLORIDE, CALCIUM CHLORIDE 600; 310; 30; 20 MG/100ML; MG/100ML; MG/100ML; MG/100ML
1000 INJECTION, SOLUTION INTRAVENOUS
Status: COMPLETED | OUTPATIENT
Start: 2024-02-16 | End: 2024-02-16

## 2024-02-16 RX ORDER — SODIUM CHLORIDE 0.9 % (FLUSH) 0.9 %
10 SYRINGE (ML) INJECTION
Status: CANCELLED | OUTPATIENT
Start: 2024-02-16

## 2024-02-16 RX ADMIN — SODIUM CHLORIDE, POTASSIUM CHLORIDE, SODIUM LACTATE AND CALCIUM CHLORIDE 1000 ML: 600; 310; 30; 20 INJECTION, SOLUTION INTRAVENOUS at 11:02

## 2024-02-16 RX ADMIN — SODIUM CHLORIDE, POTASSIUM CHLORIDE, SODIUM LACTATE AND CALCIUM CHLORIDE 500 ML: 600; 310; 30; 20 INJECTION, SOLUTION INTRAVENOUS at 10:02

## 2024-02-16 RX ADMIN — CEFTRIAXONE 1 G: 1 INJECTION, POWDER, FOR SOLUTION INTRAMUSCULAR; INTRAVENOUS at 11:02

## 2024-02-16 RX ADMIN — AZITHROMYCIN MONOHYDRATE 500 MG: 500 INJECTION, POWDER, LYOPHILIZED, FOR SOLUTION INTRAVENOUS at 10:02

## 2024-02-16 RX ADMIN — ONDANSETRON 4 MG: 2 INJECTION INTRAMUSCULAR; INTRAVENOUS at 10:02

## 2024-02-16 RX ADMIN — IBUPROFEN 600 MG: 200 TABLET, FILM COATED ORAL at 09:02

## 2024-02-17 PROBLEM — K21.9 GERD (GASTROESOPHAGEAL REFLUX DISEASE): Status: ACTIVE | Noted: 2024-02-17

## 2024-02-17 PROBLEM — R05.9 COUGH: Status: ACTIVE | Noted: 2024-02-17

## 2024-02-17 PROBLEM — J18.9 CAP (COMMUNITY ACQUIRED PNEUMONIA): Status: ACTIVE | Noted: 2024-02-17

## 2024-02-17 LAB
BACTERIA #/AREA URNS AUTO: NORMAL /HPF
BILIRUB UR QL STRIP: NEGATIVE
CLARITY UR REFRACT.AUTO: CLEAR
COLOR UR AUTO: YELLOW
GLUCOSE UR QL STRIP: NEGATIVE
HGB UR QL STRIP: NEGATIVE
KETONES UR QL STRIP: NEGATIVE
LEUKOCYTE ESTERASE UR QL STRIP: ABNORMAL
MICROSCOPIC COMMENT: NORMAL
NITRITE UR QL STRIP: NEGATIVE
PH UR STRIP: 6 [PH] (ref 5–8)
PROT UR QL STRIP: NEGATIVE
SP GR UR STRIP: 1.01 (ref 1–1.03)
SQUAMOUS #/AREA URNS AUTO: 1 /HPF
URN SPEC COLLECT METH UR: ABNORMAL
UROBILINOGEN UR STRIP-ACNC: <2 EU/DL
WBC #/AREA URNS AUTO: 3 /HPF (ref 0–5)

## 2024-02-17 PROCEDURE — 63600175 PHARM REV CODE 636 W HCPCS: Performed by: NURSE PRACTITIONER

## 2024-02-17 PROCEDURE — 25000003 PHARM REV CODE 250: Performed by: NURSE PRACTITIONER

## 2024-02-17 PROCEDURE — 96376 TX/PRO/DX INJ SAME DRUG ADON: CPT

## 2024-02-17 PROCEDURE — 63600175 PHARM REV CODE 636 W HCPCS: Performed by: INTERNAL MEDICINE

## 2024-02-17 PROCEDURE — 27000221 HC OXYGEN, UP TO 24 HOURS

## 2024-02-17 PROCEDURE — 94761 N-INVAS EAR/PLS OXIMETRY MLT: CPT

## 2024-02-17 PROCEDURE — 11000001 HC ACUTE MED/SURG PRIVATE ROOM

## 2024-02-17 PROCEDURE — 25500020 PHARM REV CODE 255: Performed by: INTERNAL MEDICINE

## 2024-02-17 PROCEDURE — 81000 URINALYSIS NONAUTO W/SCOPE: CPT | Mod: ER | Performed by: EMERGENCY MEDICINE

## 2024-02-17 PROCEDURE — 25000003 PHARM REV CODE 250: Performed by: INTERNAL MEDICINE

## 2024-02-17 RX ORDER — AMLODIPINE BESYLATE 10 MG/1
10 TABLET ORAL EVERY MORNING
Status: DISCONTINUED | OUTPATIENT
Start: 2024-02-17 | End: 2024-02-19 | Stop reason: HOSPADM

## 2024-02-17 RX ORDER — IPRATROPIUM BROMIDE AND ALBUTEROL SULFATE 2.5; .5 MG/3ML; MG/3ML
3 SOLUTION RESPIRATORY (INHALATION) EVERY 4 HOURS PRN
Status: DISCONTINUED | OUTPATIENT
Start: 2024-02-17 | End: 2024-02-19 | Stop reason: HOSPADM

## 2024-02-17 RX ORDER — SERTRALINE HYDROCHLORIDE 50 MG/1
100 TABLET, FILM COATED ORAL DAILY
Status: DISCONTINUED | OUTPATIENT
Start: 2024-02-17 | End: 2024-02-19 | Stop reason: HOSPADM

## 2024-02-17 RX ORDER — ACETAMINOPHEN 325 MG/1
650 TABLET ORAL EVERY 4 HOURS PRN
Status: DISCONTINUED | OUTPATIENT
Start: 2024-02-17 | End: 2024-02-19 | Stop reason: HOSPADM

## 2024-02-17 RX ORDER — ENOXAPARIN SODIUM 100 MG/ML
40 INJECTION SUBCUTANEOUS EVERY 24 HOURS
Status: DISCONTINUED | OUTPATIENT
Start: 2024-02-17 | End: 2024-02-19 | Stop reason: HOSPADM

## 2024-02-17 RX ORDER — TRAZODONE HYDROCHLORIDE 100 MG/1
2 TABLET ORAL NIGHTLY
COMMUNITY
Start: 2024-01-19

## 2024-02-17 RX ORDER — ROSUVASTATIN CALCIUM 10 MG/1
20 TABLET, COATED ORAL NIGHTLY
Status: DISCONTINUED | OUTPATIENT
Start: 2024-02-17 | End: 2024-02-17

## 2024-02-17 RX ORDER — ACETAMINOPHEN 325 MG/1
650 TABLET ORAL EVERY 4 HOURS PRN
Status: DISCONTINUED | OUTPATIENT
Start: 2024-02-17 | End: 2024-02-17

## 2024-02-17 RX ORDER — TRAZODONE HYDROCHLORIDE 100 MG/1
100 TABLET ORAL NIGHTLY
Status: DISCONTINUED | OUTPATIENT
Start: 2024-02-17 | End: 2024-02-19 | Stop reason: HOSPADM

## 2024-02-17 RX ORDER — PANTOPRAZOLE SODIUM 40 MG/1
40 TABLET, DELAYED RELEASE ORAL 2 TIMES DAILY
Status: DISCONTINUED | OUTPATIENT
Start: 2024-02-17 | End: 2024-02-19 | Stop reason: HOSPADM

## 2024-02-17 RX ORDER — IBUPROFEN 400 MG/1
400 TABLET ORAL EVERY 6 HOURS PRN
Status: DISCONTINUED | OUTPATIENT
Start: 2024-02-17 | End: 2024-02-19 | Stop reason: HOSPADM

## 2024-02-17 RX ORDER — BUTALBITAL, ACETAMINOPHEN AND CAFFEINE 50; 325; 40 MG/1; MG/1; MG/1
1 TABLET ORAL 3 TIMES DAILY PRN
Status: DISCONTINUED | OUTPATIENT
Start: 2024-02-17 | End: 2024-02-19 | Stop reason: HOSPADM

## 2024-02-17 RX ORDER — ATORVASTATIN CALCIUM 40 MG/1
80 TABLET, FILM COATED ORAL NIGHTLY
Status: DISCONTINUED | OUTPATIENT
Start: 2024-02-17 | End: 2024-02-19 | Stop reason: HOSPADM

## 2024-02-17 RX ORDER — SODIUM CHLORIDE 0.9 % (FLUSH) 0.9 %
10 SYRINGE (ML) INJECTION EVERY 12 HOURS PRN
Status: DISCONTINUED | OUTPATIENT
Start: 2024-02-17 | End: 2024-02-19 | Stop reason: HOSPADM

## 2024-02-17 RX ORDER — PROMETHAZINE HYDROCHLORIDE 12.5 MG/1
12.5 TABLET ORAL EVERY 6 HOURS PRN
Status: DISCONTINUED | OUTPATIENT
Start: 2024-02-17 | End: 2024-02-19 | Stop reason: HOSPADM

## 2024-02-17 RX ORDER — TALC
6 POWDER (GRAM) TOPICAL NIGHTLY PRN
Status: DISCONTINUED | OUTPATIENT
Start: 2024-02-17 | End: 2024-02-19 | Stop reason: HOSPADM

## 2024-02-17 RX ORDER — HYDROCODONE BITARTRATE AND HOMATROPINE METHYLBROMIDE 1.5; 5 MG/5ML; MG/5ML
5 SYRUP ORAL EVERY 6 HOURS PRN
Status: DISCONTINUED | OUTPATIENT
Start: 2024-02-17 | End: 2024-02-19 | Stop reason: HOSPADM

## 2024-02-17 RX ORDER — ONDANSETRON HYDROCHLORIDE 2 MG/ML
4 INJECTION, SOLUTION INTRAVENOUS EVERY 8 HOURS PRN
Status: DISCONTINUED | OUTPATIENT
Start: 2024-02-17 | End: 2024-02-19 | Stop reason: HOSPADM

## 2024-02-17 RX ORDER — DIPHENHYDRAMINE HCL 25 MG
25 CAPSULE ORAL NIGHTLY PRN
Status: DISCONTINUED | OUTPATIENT
Start: 2024-02-17 | End: 2024-02-19 | Stop reason: HOSPADM

## 2024-02-17 RX ORDER — BUTALBITAL, ACETAMINOPHEN AND CAFFEINE 50; 325; 40 MG/1; MG/1; MG/1
1 TABLET ORAL 3 TIMES DAILY PRN
COMMUNITY
Start: 2024-01-23 | End: 2024-02-22

## 2024-02-17 RX ORDER — BENZONATATE 100 MG/1
100 CAPSULE ORAL 3 TIMES DAILY PRN
Status: DISCONTINUED | OUTPATIENT
Start: 2024-02-17 | End: 2024-02-19 | Stop reason: HOSPADM

## 2024-02-17 RX ORDER — CETIRIZINE HYDROCHLORIDE 10 MG/1
10 TABLET ORAL DAILY
Status: DISCONTINUED | OUTPATIENT
Start: 2024-02-17 | End: 2024-02-19 | Stop reason: HOSPADM

## 2024-02-17 RX ADMIN — SERTRALINE HYDROCHLORIDE 100 MG: 50 TABLET ORAL at 09:02

## 2024-02-17 RX ADMIN — PROMETHAZINE HYDROCHLORIDE 12.5 MG: 12.5 TABLET ORAL at 06:02

## 2024-02-17 RX ADMIN — PANTOPRAZOLE SODIUM 40 MG: 40 TABLET, DELAYED RELEASE ORAL at 09:02

## 2024-02-17 RX ADMIN — CEFTRIAXONE 1 G: 1 INJECTION, POWDER, FOR SOLUTION INTRAMUSCULAR; INTRAVENOUS at 11:02

## 2024-02-17 RX ADMIN — BENZONATATE 100 MG: 100 CAPSULE ORAL at 03:02

## 2024-02-17 RX ADMIN — ENOXAPARIN SODIUM 40 MG: 40 INJECTION SUBCUTANEOUS at 05:02

## 2024-02-17 RX ADMIN — IOHEXOL 100 ML: 350 INJECTION, SOLUTION INTRAVENOUS at 01:02

## 2024-02-17 RX ADMIN — BENZONATATE 100 MG: 100 CAPSULE ORAL at 09:02

## 2024-02-17 RX ADMIN — AMLODIPINE BESYLATE 10 MG: 10 TABLET ORAL at 09:02

## 2024-02-17 RX ADMIN — ATORVASTATIN CALCIUM 80 MG: 40 TABLET, FILM COATED ORAL at 09:02

## 2024-02-17 RX ADMIN — BUTALBITAL, ACETAMINOPHEN, AND CAFFEINE 1 TABLET: 325; 50; 40 TABLET ORAL at 01:02

## 2024-02-17 RX ADMIN — ONDANSETRON 4 MG: 2 INJECTION INTRAMUSCULAR; INTRAVENOUS at 09:02

## 2024-02-17 RX ADMIN — PROMETHAZINE HYDROCHLORIDE 12.5 MG: 12.5 TABLET ORAL at 07:02

## 2024-02-17 RX ADMIN — BUTALBITAL, ACETAMINOPHEN, AND CAFFEINE 1 TABLET: 325; 50; 40 TABLET ORAL at 07:02

## 2024-02-17 RX ADMIN — IBUPROFEN 400 MG: 400 TABLET, FILM COATED ORAL at 11:02

## 2024-02-17 RX ADMIN — TRAZODONE HYDROCHLORIDE 100 MG: 100 TABLET ORAL at 09:02

## 2024-02-17 RX ADMIN — CETIRIZINE HYDROCHLORIDE 10 MG: 10 TABLET, FILM COATED ORAL at 09:02

## 2024-02-17 NOTE — SUBJECTIVE & OBJECTIVE
Past Medical History:   Diagnosis Date    Chronic headaches     Depression     Diverticulitis     GERD (gastroesophageal reflux disease)     Hypercholesteremia     Hypertension     Migraine headache        Past Surgical History:   Procedure Laterality Date    ABDOMINAL SURGERY      APPENDECTOMY      CATARACT EXTRACTION      EYE SURGERY      HYSTERECTOMY      TONSILLECTOMY         Review of patient's allergies indicates:   Allergen Reactions    Amitriptyline     Amoxicillin-pot clavulanate Other (See Comments)    Sulfa (sulfonamide antibiotics)        No current facility-administered medications on file prior to encounter.     Current Outpatient Medications on File Prior to Encounter   Medication Sig    alendronate (FOSAMAX) 70 MG tablet Take 70 mg by mouth every 7 days.    amLODIPine (NORVASC) 10 MG tablet Take 1 tablet by mouth every morning.    butalbital-acetaminophen-caffeine -40 mg (FIORICET, ESGIC) -40 mg per tablet Take 1 tablet by mouth 3 (three) times daily as needed.    iron-vit c-b12-folic acid (ICAR-C PLUS) Tab Take 1 tablet by mouth every morning.    pantoprazole (PROTONIX) 40 MG tablet Take 40 mg by mouth 2 (two) times daily.     ROSUVASTATIN CALCIUM (CRESTOR ORAL) Take by mouth.    sertraline (ZOLOFT) 100 MG tablet Take 100 mg by mouth once daily.    SUMATRIPTAN SUCCINATE (IMITREX ORAL) Take by mouth as needed.     traZODone (DESYREL) 100 MG tablet Take 2 tablets by mouth every evening.    lipase-protease-amylase (ZENPEP) 25,000-85,000- 136,000 unit CpDR Take by mouth before meals and at bedtime as needed.    loratadine (CLARITIN) 10 mg tablet Take 10 mg by mouth once daily at 6am.    promethazine (PHENERGAN) 25 MG tablet Take 0.5 tablets (12.5 mg total) by mouth every 6 (six) hours as needed for Nausea.     Family History    None       Tobacco Use    Smoking status: Former    Smokeless tobacco: Never   Substance and Sexual Activity    Alcohol use: No    Drug use: No    Sexual activity:  Not on file     Review of Systems   Constitutional:  Positive for chills, diaphoresis, fatigue and fever.   HENT:  Positive for congestion. Negative for rhinorrhea and sore throat.    Respiratory:  Positive for cough and shortness of breath.    Cardiovascular:  Negative for chest pain, palpitations and leg swelling.   Gastrointestinal:  Positive for nausea. Negative for abdominal pain and vomiting.   All other systems reviewed and are negative.    Objective:     Vital Signs (Most Recent):  Temp: 98.4 °F (36.9 °C) (02/17/24 0254)  Pulse: 70 (02/17/24 0400)  Resp: 18 (02/17/24 0254)  BP: (!) 102/58 (02/17/24 0254)  SpO2: (!) 94 % (02/17/24 0254) Vital Signs (24h Range):  Temp:  [98.4 °F (36.9 °C)-99.1 °F (37.3 °C)] 98.4 °F (36.9 °C)  Pulse:  [] 70  Resp:  [18-26] 18  SpO2:  [87 %-96 %] 94 %  BP: ()/(51-60) 102/58     Weight: 63.8 kg (140 lb 10.5 oz)  Body mass index is 23.41 kg/m².     Physical Exam  Vitals and nursing note reviewed.   Constitutional:       General: She is awake. She is not in acute distress.     Appearance: Normal appearance. She is well-developed and well-groomed. She is not ill-appearing, toxic-appearing or diaphoretic.   HENT:      Head: Normocephalic and atraumatic.   Eyes:      Extraocular Movements: Extraocular movements intact.      Conjunctiva/sclera: Conjunctivae normal.   Cardiovascular:      Rate and Rhythm: Normal rate and regular rhythm.      Heart sounds: Normal heart sounds. No murmur heard.  Pulmonary:      Effort: Pulmonary effort is normal.      Breath sounds: Rhonchi present. No decreased breath sounds, wheezing or rales.   Abdominal:      General: Bowel sounds are normal.      Palpations: Abdomen is soft.      Tenderness: There is no abdominal tenderness.   Musculoskeletal:      Cervical back: Normal range of motion and neck supple.      Right lower leg: No edema.      Left lower leg: No edema.      Comments: 5/5 strength throughout   Skin:     General: Skin is warm  and dry.      Capillary Refill: Capillary refill takes less than 2 seconds.   Neurological:      General: No focal deficit present.      Mental Status: She is alert and oriented to person, place, and time. Mental status is at baseline.      GCS: GCS eye subscore is 4. GCS verbal subscore is 5. GCS motor subscore is 6.      Cranial Nerves: Cranial nerves 2-12 are intact.      Sensory: Sensation is intact.      Motor: Motor function is intact.   Psychiatric:         Mood and Affect: Mood normal.         Speech: Speech normal.         Behavior: Behavior normal. Behavior is cooperative.           LABS:  Recent Results (from the past 24 hour(s))   POCT Influenza A/B Molecular    Collection Time: 02/16/24  9:40 PM   Result Value Ref Range    POC Molecular Influenza A Ag Negative Negative, Not Reported    POC Molecular Influenza B Ag Negative Negative, Not Reported     Acceptable Yes    POCT COVID-19 Rapid Screening    Collection Time: 02/16/24  9:40 PM   Result Value Ref Range    POC Rapid COVID Negative Negative     Acceptable Yes    CBC auto differential    Collection Time: 02/16/24  9:43 PM   Result Value Ref Range    WBC 11.08 3.90 - 12.70 K/uL    RBC 3.82 (L) 4.00 - 5.40 M/uL    Hemoglobin 10.9 (L) 12.0 - 16.0 g/dL    Hematocrit 33.2 (L) 37.0 - 48.5 %    MCV 87 82 - 98 fL    MCH 28.5 27.0 - 31.0 pg    MCHC 32.8 32.0 - 36.0 g/dL    RDW 13.6 11.5 - 14.5 %    Platelets 246 150 - 450 K/uL    MPV 10.2 9.2 - 12.9 fL    Immature Granulocytes 0.5 0.0 - 0.5 %    Gran # (ANC) 9.1 (H) 1.8 - 7.7 K/uL    Immature Grans (Abs) 0.06 (H) 0.00 - 0.04 K/uL    Lymph # 0.6 (L) 1.0 - 4.8 K/uL    Mono # 0.8 0.3 - 1.0 K/uL    Eos # 0.4 0.0 - 0.5 K/uL    Baso # 0.06 0.00 - 0.20 K/uL    nRBC 0 0 /100 WBC    Gran % 82.6 (H) 38.0 - 73.0 %    Lymph % 5.2 (L) 18.0 - 48.0 %    Mono % 7.2 4.0 - 15.0 %    Eosinophil % 4.0 0.0 - 8.0 %    Basophil % 0.5 0.0 - 1.9 %    Differential Method Automated    Comprehensive  metabolic panel    Collection Time: 02/16/24  9:43 PM   Result Value Ref Range    Sodium 141 136 - 145 mmol/L    Potassium 3.5 3.5 - 5.1 mmol/L    Chloride 106 95 - 110 mmol/L    CO2 21 (L) 23 - 29 mmol/L    Glucose 149 (H) 70 - 110 mg/dL    BUN 15 8 - 23 mg/dL    Creatinine 0.7 0.5 - 1.4 mg/dL    Calcium 9.0 8.7 - 10.5 mg/dL    Total Protein 7.0 6.0 - 8.4 g/dL    Albumin 2.9 (L) 3.5 - 5.2 g/dL    Total Bilirubin 0.3 0.1 - 1.0 mg/dL    Alkaline Phosphatase 130 55 - 135 U/L    AST 24 10 - 40 U/L    ALT 10 10 - 44 U/L    eGFR >60.0 >60 mL/min/1.73 m^2    Anion Gap 14 8 - 16 mmol/L   Lactic acid, plasma    Collection Time: 02/16/24  9:43 PM   Result Value Ref Range    Lactate (Lactic Acid) 0.9 0.5 - 2.2 mmol/L   Troponin I    Collection Time: 02/16/24  9:43 PM   Result Value Ref Range    Troponin I <0.006 0.000 - 0.026 ng/mL   B-Type natriuretic peptide (BNP)    Collection Time: 02/16/24  9:43 PM   Result Value Ref Range    BNP 78 0 - 99 pg/mL   D dimer, quantitative    Collection Time: 02/16/24  9:43 PM   Result Value Ref Range    D-Dimer 1.40 (H) <0.50 mg/L FEU   ISTAT PROCEDURE    Collection Time: 02/16/24 10:03 PM   Result Value Ref Range    POC PH 7.456 (H) 7.35 - 7.45    POC PCO2 35.3 35 - 45 mmHg    POC PO2 56 (LL) 80 - 100 mmHg    POC HCO3 24.9 24 - 28 mmol/L    POC BE 1 -2 to 2 mmol/L    POC SATURATED O2 91 95 - 100 %    Sample ARTERIAL     Site RR     Allens Test Pass     DelSys Room Air     Mode SPONT    Urinalysis, Reflex to Urine Culture Urine, Clean Catch    Collection Time: 02/17/24  1:09 AM    Specimen: Urine   Result Value Ref Range    Specimen UA Urine, Clean Catch     Color, UA Yellow Yellow, Straw, Latosha    Appearance, UA Clear Clear    pH, UA 6.0 5.0 - 8.0    Specific Gravity, UA 1.015 1.005 - 1.030    Protein, UA Negative Negative    Glucose, UA Negative Negative    Ketones, UA Negative Negative    Bilirubin (UA) Negative Negative    Occult Blood UA Negative Negative    Nitrite, UA Negative  Negative    Urobilinogen, UA <2.0 <2.0 EU/dL    Leukocytes, UA Trace (A) Negative   Urinalysis Microscopic    Collection Time: 02/17/24  1:09 AM   Result Value Ref Range    WBC, UA 3 0 - 5 /hpf    Bacteria Rare None-Occ /hpf    Squam Epithel, UA 1 /hpf    Microscopic Comment SEE COMMENT        RADIOLOGY  X-Ray Chest PA And Lateral    Result Date: 2/16/2024  EXAMINATION: XR CHEST PA AND LATERAL CLINICAL HISTORY: Chest Pain; TECHNIQUE: PA and lateral views of the chest were performed. COMPARISON: 10/07/2020 FINDINGS: There is right midlung interstitial and hazy airspace opacity.  There is lesser left lung peripheral interstitial opacity.  No sizable pleural effusion.  Mediastinum rotated     Bilateral mixed interstitial and airspace pulmonary opacity Electronically signed by: Dominique Shirley Date:    02/16/2024 Time:    22:58      EKG    MICROBIOLOGY    MDM     Amount and/or Complexity of Data Reviewed  Clinical lab tests: reviewed  Tests in the radiology section of CPT®: reviewed  Tests in the medicine section of CPT®: reviewed  Discussion of test results with the performing providers: yes  Decide to obtain previous medical records or to obtain history from someone other than the patient: yes  Obtain history from someone other than the patient: yes  Review and summarize past medical records: yes  Discuss the patient with other providers: yes  Independent visualization of images, tracings, or specimens: yes

## 2024-02-17 NOTE — ASSESSMENT & PLAN NOTE
Patient is chronically on statin.will continue for now. Last Lipid Panel:   Lab Results   Component Value Date    CHOL 199 09/20/2023    HDL 59 09/20/2023    LDLCALC 119 (H) 09/20/2023    TRIG 121 09/20/2023     Plan:  -Continue home medication  -low fat/low calorie diet

## 2024-02-17 NOTE — H&P
[facility] - [address] Medicine  History & Physical    Patient Name: Gaby Stanley  MRN: [id number]  Patient Class: OP- Observation  Admission Date: 2/16/2024  Attending Physician: Rosalinda Sanders MD   Primary Care Provider: Aaron Narayan MD         Patient information was obtained from patient, spouse/SO, past medical records, and ER records.     Subjective:     Principal Problem:CAP (community acquired pneumonia)    Chief Complaint:   Chief Complaint   Patient presents with    Fatigue     With fever at home for 2 days. + nausea         HPI: Gaby Stanley is a 73 y.o. female with a PMH  has a past medical history of Chronic headaches, Depression, Diverticulitis, GERD (gastroesophageal reflux disease), Hypercholesteremia, Hypertension, and Migraine headache. Presents as a transfer from our [facility] facility for further evaluation/treatment for CAP.  Patient initially presented to that facility for worsening nonproductive cough which started last week with associated generalized weakness/fatigue.  Patient states that yesterday she began experiencing fever, chills, sweats, and nausea.  Denies any relief with over-the-counter Robitussin and Mucinex DM.  Patient reports relief with home Phenergan for her nausea.  Denies any actual chest pain, headache, lightheadedness, dizziness, abdominal pain common bladder/bowel complaints, or any other symptoms at this time.    ER workup revealed D-dimer of 1.4, CBG of 149 mg/dL, H/H of 10.9/33.2, flu/COVID negative, UA negative, BNP and troponin negative, lactic acid negative, ABG revealed pH of 7.456, pCO2 of 35.3, PO2 of 56, and HC03 of 24.9.  Chest x-ray revealed:[Bilateral mixed interstitial and airspace pulmonary opacit].  EKG revealed sinus tachycardia with a ventricular rate of 103 beats per minute with a QT/QTC of 370/44.  Patient received 1 g of Rocephin and 500 mg of azithromycin at outside facility.  Patient also received 600 mg of ibuprofen, 1.5 L of  lactated Ringer's, 4 mg of Zofran.  Hospital Medicine consulted to admit patient for CAP.  Patient in agreement with treatment plan.  Patient admitted under observation status.      PCP: Aaron Narayan      Past Medical History:   Diagnosis Date    Chronic headaches     Depression     Diverticulitis     GERD (gastroesophageal reflux disease)     Hypercholesteremia     Hypertension     Migraine headache        Past Surgical History:   Procedure Laterality Date    ABDOMINAL SURGERY      APPENDECTOMY      CATARACT EXTRACTION      EYE SURGERY      HYSTERECTOMY      TONSILLECTOMY         Review of patient's allergies indicates:   Allergen Reactions    Amitriptyline     Amoxicillin-pot clavulanate Other (See Comments)    Sulfa (sulfonamide antibiotics)        No current facility-administered medications on file prior to encounter.     Current Outpatient Medications on File Prior to Encounter   Medication Sig    alendronate (FOSAMAX) 70 MG tablet Take 70 mg by mouth every 7 days.    amLODIPine (NORVASC) 10 MG tablet Take 1 tablet by mouth every morning.    butalbital-acetaminophen-caffeine -40 mg (FIORICET, ESGIC) -40 mg per tablet Take 1 tablet by mouth 3 (three) times daily as needed.    iron-vit c-b12-folic acid (ICAR-C PLUS) Tab Take 1 tablet by mouth every morning.    pantoprazole (PROTONIX) 40 MG tablet Take 40 mg by mouth 2 (two) times daily.     ROSUVASTATIN CALCIUM (CRESTOR ORAL) Take by mouth.    sertraline (ZOLOFT) 100 MG tablet Take 100 mg by mouth once daily.    SUMATRIPTAN SUCCINATE (IMITREX ORAL) Take by mouth as needed.     traZODone (DESYREL) 100 MG tablet Take 2 tablets by mouth every evening.    lipase-protease-amylase (ZENPEP) 25,000-85,000- 136,000 unit CpDR Take by mouth before meals and at bedtime as needed.    loratadine (CLARITIN) 10 mg tablet Take 10 mg by mouth once daily at 6am.    promethazine (PHENERGAN) 25 MG tablet Take 0.5 tablets (12.5 mg total) by mouth every 6 (six) hours  as needed for Nausea.     Family History    None       Tobacco Use    Smoking status: Former    Smokeless tobacco: Never   Substance and Sexual Activity    Alcohol use: No    Drug use: No    Sexual activity: Not on file     Review of Systems   Constitutional:  Positive for chills, diaphoresis, fatigue and fever.   HENT:  Positive for congestion. Negative for rhinorrhea and sore throat.    Respiratory:  Positive for cough and shortness of breath.    Cardiovascular:  Negative for chest pain, palpitations and leg swelling.   Gastrointestinal:  Positive for nausea. Negative for abdominal pain and vomiting.   All other systems reviewed and are negative.    Objective:     Vital Signs (Most Recent):  Temp: 98.4 °F (36.9 °C) (02/17/24 0254)  Pulse: 70 (02/17/24 0400)  Resp: 18 (02/17/24 0254)  BP: (!) 102/58 (02/17/24 0254)  SpO2: (!) 94 % (02/17/24 0254) Vital Signs (24h Range):  Temp:  [98.4 °F (36.9 °C)-99.1 °F (37.3 °C)] 98.4 °F (36.9 °C)  Pulse:  [] 70  Resp:  [18-26] 18  SpO2:  [87 %-96 %] 94 %  BP: ()/(51-60) 102/58     Weight: 63.8 kg (140 lb 10.5 oz)  Body mass index is 23.41 kg/m².     Physical Exam  Vitals and nursing note reviewed.   Constitutional:       General: She is awake. She is not in acute distress.     Appearance: Normal appearance. She is well-developed and well-groomed. She is not ill-appearing, toxic-appearing or diaphoretic.   HENT:      Head: Normocephalic and atraumatic.   Eyes:      Extraocular Movements: Extraocular movements intact.      Conjunctiva/sclera: Conjunctivae normal.   Cardiovascular:      Rate and Rhythm: Normal rate and regular rhythm.      Heart sounds: Normal heart sounds. No murmur heard.  Pulmonary:      Effort: Pulmonary effort is normal.      Breath sounds: Rhonchi present. No decreased breath sounds, wheezing or rales.   Abdominal:      General: Bowel sounds are normal.      Palpations: Abdomen is soft.      Tenderness: There is no abdominal tenderness.    Musculoskeletal:      Cervical back: Normal range of motion and neck supple.      Right lower leg: No edema.      Left lower leg: No edema.      Comments: 5/5 strength throughout   Skin:     General: Skin is warm and dry.      Capillary Refill: Capillary refill takes less than 2 seconds.   Neurological:      General: No focal deficit present.      Mental Status: She is alert and oriented to person, place, and time. Mental status is at baseline.      GCS: GCS eye subscore is 4. GCS verbal subscore is 5. GCS motor subscore is 6.      Cranial Nerves: Cranial nerves 2-12 are intact.      Sensory: Sensation is intact.      Motor: Motor function is intact.   Psychiatric:         Mood and Affect: Mood normal.         Speech: Speech normal.         Behavior: Behavior normal. Behavior is cooperative.           LABS:  Recent Results (from the past 24 hour(s))   POCT Influenza A/B Molecular    Collection Time: 02/16/24  9:40 PM   Result Value Ref Range    POC Molecular Influenza A Ag Negative Negative, Not Reported    POC Molecular Influenza B Ag Negative Negative, Not Reported     Acceptable Yes    POCT COVID-19 Rapid Screening    Collection Time: 02/16/24  9:40 PM   Result Value Ref Range    POC Rapid COVID Negative Negative     Acceptable Yes    CBC auto differential    Collection Time: 02/16/24  9:43 PM   Result Value Ref Range    WBC 11.08 3.90 - 12.70 K/uL    RBC 3.82 (L) 4.00 - 5.40 M/uL    Hemoglobin 10.9 (L) 12.0 - 16.0 g/dL    Hematocrit 33.2 (L) 37.0 - 48.5 %    MCV 87 82 - 98 fL    MCH 28.5 27.0 - 31.0 pg    MCHC 32.8 32.0 - 36.0 g/dL    RDW 13.6 11.5 - 14.5 %    Platelets 246 150 - 450 K/uL    MPV 10.2 9.2 - 12.9 fL    Immature Granulocytes 0.5 0.0 - 0.5 %    Gran # (ANC) 9.1 (H) 1.8 - 7.7 K/uL    Immature Grans (Abs) 0.06 (H) 0.00 - 0.04 K/uL    Lymph # 0.6 (L) 1.0 - 4.8 K/uL    Mono # 0.8 0.3 - 1.0 K/uL    Eos # 0.4 0.0 - 0.5 K/uL    Baso # 0.06 0.00 - 0.20 K/uL    nRBC 0 0 /100  WBC    Gran % 82.6 (H) 38.0 - 73.0 %    Lymph % 5.2 (L) 18.0 - 48.0 %    Mono % 7.2 4.0 - 15.0 %    Eosinophil % 4.0 0.0 - 8.0 %    Basophil % 0.5 0.0 - 1.9 %    Differential Method Automated    Comprehensive metabolic panel    Collection Time: 02/16/24  9:43 PM   Result Value Ref Range    Sodium 141 136 - 145 mmol/L    Potassium 3.5 3.5 - 5.1 mmol/L    Chloride 106 95 - 110 mmol/L    CO2 21 (L) 23 - 29 mmol/L    Glucose 149 (H) 70 - 110 mg/dL    BUN 15 8 - 23 mg/dL    Creatinine 0.7 0.5 - 1.4 mg/dL    Calcium 9.0 8.7 - 10.5 mg/dL    Total Protein 7.0 6.0 - 8.4 g/dL    Albumin 2.9 (L) 3.5 - 5.2 g/dL    Total Bilirubin 0.3 0.1 - 1.0 mg/dL    Alkaline Phosphatase 130 55 - 135 U/L    AST 24 10 - 40 U/L    ALT 10 10 - 44 U/L    eGFR >60.0 >60 mL/min/1.73 m^2    Anion Gap 14 8 - 16 mmol/L   Lactic acid, plasma    Collection Time: 02/16/24  9:43 PM   Result Value Ref Range    Lactate (Lactic Acid) 0.9 0.5 - 2.2 mmol/L   Troponin I    Collection Time: 02/16/24  9:43 PM   Result Value Ref Range    Troponin I <0.006 0.000 - 0.026 ng/mL   B-Type natriuretic peptide (BNP)    Collection Time: 02/16/24  9:43 PM   Result Value Ref Range    BNP 78 0 - 99 pg/mL   D dimer, quantitative    Collection Time: 02/16/24  9:43 PM   Result Value Ref Range    D-Dimer 1.40 (H) <0.50 mg/L FEU   ISTAT PROCEDURE    Collection Time: 02/16/24 10:03 PM   Result Value Ref Range    POC PH 7.456 (H) 7.35 - 7.45    POC PCO2 35.3 35 - 45 mmHg    POC PO2 56 (LL) 80 - 100 mmHg    POC HCO3 24.9 24 - 28 mmol/L    POC BE 1 -2 to 2 mmol/L    POC SATURATED O2 91 95 - 100 %    Sample ARTERIAL     Site RR     Allens Test Pass     DelSys Room Air     Mode SPONT    Urinalysis, Reflex to Urine Culture Urine, Clean Catch    Collection Time: 02/17/24  1:09 AM    Specimen: Urine   Result Value Ref Range    Specimen UA Urine, Clean Catch     Color, UA Yellow Yellow, Straw, Latosha    Appearance, UA Clear Clear    pH, UA 6.0 5.0 - 8.0    Specific Gravity, UA 1.015 1.005 -  1.030    Protein, UA Negative Negative    Glucose, UA Negative Negative    Ketones, UA Negative Negative    Bilirubin (UA) Negative Negative    Occult Blood UA Negative Negative    Nitrite, UA Negative Negative    Urobilinogen, UA <2.0 <2.0 EU/dL    Leukocytes, UA Trace (A) Negative   Urinalysis Microscopic    Collection Time: 02/17/24  1:09 AM   Result Value Ref Range    WBC, UA 3 0 - 5 /hpf    Bacteria Rare None-Occ /hpf    Squam Epithel, UA 1 /hpf    Microscopic Comment SEE COMMENT        RADIOLOGY  X-Ray Chest PA And Lateral    Result Date: 2/16/2024  EXAMINATION: XR CHEST PA AND LATERAL CLINICAL HISTORY: Chest Pain; TECHNIQUE: PA and lateral views of the chest were performed. COMPARISON: 10/07/2020 FINDINGS: There is right midlung interstitial and hazy airspace opacity.  There is lesser left lung peripheral interstitial opacity.  No sizable pleural effusion.  Mediastinum rotated     Bilateral mixed interstitial and airspace pulmonary opacity Electronically signed by: Dominique Shirley Date:    02/16/2024 Time:    22:58      EKG    MICROBIOLOGY    MDM     Amount and/or Complexity of Data Reviewed  Clinical lab tests: reviewed  Tests in the radiology section of CPT®: reviewed  Tests in the medicine section of CPT®: reviewed  Discussion of test results with the performing providers: yes  Decide to obtain previous medical records or to obtain history from someone other than the patient: yes  Obtain history from someone other than the patient: yes  Review and summarize past medical records: yes  Discuss the patient with other providers: yes  Independent visualization of images, tracings, or specimens: yes        Assessment/Plan:     * CAP (community acquired pneumonia)  Patient with current diagnosis of pneumonia due to unspecified organism which is controlled. I have reviewed the pertinent imaging. Current antimicrobial regimen consists of   Antibiotics (From admission, onward)      Start     Stop Route Frequency  Ordered    02/17/24 2300  cefTRIAXone (Rocephin) 1 g in dextrose 5 % in water (D5W) 100 mL IVPB (MB+)         -- IV Every 24 hours (non-standard times) 02/17/24 0648    02/17/24 2300  azithromycin (ZITHROMAX) 500 mg in dextrose 5 % (D5W) 250 mL IVPB (Vial-Mate)         -- IV Every 24 hours (non-standard times) 02/17/24 0648        . Cultures drawn and noted-   Microbiology Results (last 7 days)       Procedure Component Value Units Date/Time    Blood Culture #2 **CANNOT BE ORDERED STAT** [[id number]] Collected: 02/16/24 2144    Order Status: Sent Specimen: Blood from Peripheral, Antecubital, Left Updated: 02/17/24 0147    Blood Culture #1 **CANNOT BE ORDERED STAT** [[id number]] Collected: 02/16/24 2143    Order Status: Sent Specimen: Blood from Peripheral, Antecubital, Right Updated: 02/17/24 0147         Will monitor patient closely and continue current treatment plan unchanged.        Cough  Plan:  -antitussives prn      Hypertension  Chronic, controlled. Latest blood pressure and vitals reviewed-     Temp:  [98.4 °F (36.9 °C)-99.1 °F (37.3 °C)]   Pulse:  []   Resp:  [18-26]   BP: ()/(51-60)   SpO2:  [87 %-96 %] .   Home meds for hypertension were reviewed and noted below.   Hypertension Medications               amLODIPine (NORVASC) 10 MG tablet Take 1 tablet by mouth every morning.            While in the hospital, will manage blood pressure as follows; Continue home antihypertensive regimen    Will utilize p.r.n. blood pressure medication only if patient's blood pressure greater than 160/100 and she develops symptoms such as worsening chest pain or shortness of breath.    Hyperlipidemia LDL goal <130  Patient is chronically on statin.will continue for now. Last Lipid Panel:   Lab Results   Component Value Date    CHOL 199 09/20/2023    HDL 59 09/20/2023    LDLCALC 119 (H) 09/20/2023    TRIG 121 09/20/2023     Plan:  -Continue home medication  -low fat/low calorie  diet        Migraine  Plan:  -continue home medications as needed      Anxiety  Chronic. Stable. Not in acute exacerbation and currently denies endorsing any suicidal/homicidal ideations.   Plan:  -Continue home medications (zoloft)        GERD (gastroesophageal reflux disease)  Chronic. Stable. Currently asymptomatic. Home medications include PPI/Antacids as needed.  Plan:  -Continue PPI/Antacids as needed           VTE Risk Mitigation (From admission, onward)           Ordered     enoxaparin injection 40 mg  Daily         02/17/24 0650     IP VTE HIGH RISK PATIENT  Once         02/17/24 0650     Place sequential compression device  Until discontinued         02/17/24 0650                     //Core Measures   -DVT proph: SCDs, lovenox  -Code status Full    -Surrogate:spouse      Components of this note were documented using a voice recognition system and are subject to errors not corrected at the time the document was proof read. Please contact the author for any clarifications.       Leonides Medina NP  Department of Hospital Medicine  [facility]

## 2024-02-17 NOTE — ASSESSMENT & PLAN NOTE
Chronic, controlled. Latest blood pressure and vitals reviewed-     Temp:  [98.4 °F (36.9 °C)-99.1 °F (37.3 °C)]   Pulse:  []   Resp:  [18-26]   BP: ()/(51-60)   SpO2:  [87 %-96 %] .   Home meds for hypertension were reviewed and noted below.   Hypertension Medications               amLODIPine (NORVASC) 10 MG tablet Take 1 tablet by mouth every morning.            While in the hospital, will manage blood pressure as follows; Continue home antihypertensive regimen    Will utilize p.r.n. blood pressure medication only if patient's blood pressure greater than 160/100 and she develops symptoms such as worsening chest pain or shortness of breath.

## 2024-02-17 NOTE — ASSESSMENT & PLAN NOTE
Patient with current diagnosis of pneumonia due to unspecified organism which is controlled. I have reviewed the pertinent imaging. Current antimicrobial regimen consists of   Antibiotics (From admission, onward)      Start     Stop Route Frequency Ordered    02/17/24 2300  cefTRIAXone (Rocephin) 1 g in dextrose 5 % in water (D5W) 100 mL IVPB (MB+)         -- IV Every 24 hours (non-standard times) 02/17/24 0648    02/17/24 2300  azithromycin (ZITHROMAX) 500 mg in dextrose 5 % (D5W) 250 mL IVPB (Vial-Mate)         -- IV Every 24 hours (non-standard times) 02/17/24 0648        . Cultures drawn and noted-   Microbiology Results (last 7 days)       Procedure Component Value Units Date/Time    Blood Culture #2 **CANNOT BE ORDERED STAT** [[id number]] Collected: 02/16/24 2144    Order Status: Sent Specimen: Blood from Peripheral, Antecubital, Left Updated: 02/17/24 0147    Blood Culture #1 **CANNOT BE ORDERED STAT** [[id number]] Collected: 02/16/24 2143    Order Status: Sent Specimen: Blood from Peripheral, Antecubital, Right Updated: 02/17/24 0147         Will monitor patient closely and continue current treatment plan unchanged.

## 2024-02-17 NOTE — HPI
Gaby Stanley is a 73 y.o. female with a PMH  has a past medical history of Chronic headaches, Depression, Diverticulitis, GERD (gastroesophageal reflux disease), Hypercholesteremia, Hypertension, and Migraine headache. Presents as a transfer from our [facility] for further evaluation/treatment for CAP.  Patient initially presented to that facility for worsening nonproductive cough which started last week with associated generalized weakness/fatigue.  Patient states that yesterday she began experiencing fever, chills, sweats, and nausea.  Denies any relief with over-the-counter Robitussin and Mucinex DM.  Patient reports relief with home Phenergan for her nausea.  Denies any actual chest pain, headache, lightheadedness, dizziness, abdominal pain common bladder/bowel complaints, or any other symptoms at this time.    ER workup revealed D-dimer of 1.4, CBG of 149 mg/dL, H/H of 10.9/33.2, flu/COVID negative, UA negative, BNP and troponin negative, lactic acid negative, ABG revealed pH of 7.456, pCO2 of 35.3, PO2 of 56, and HC03 of 24.9.  Chest x-ray revealed:[Bilateral mixed interstitial and airspace pulmonary opacit].  EKG revealed sinus tachycardia with a ventricular rate of 103 beats per minute with a QT/QTC of 370/44.  Patient received 1 g of Rocephin and 500 mg of azithromycin at outside facility.  Patient also received 600 mg of ibuprofen, 1.5 L of lactated Ringer's, 4 mg of Zofran.  Hospital Medicine consulted to admit patient for CAP.  Patient in agreement with treatment plan.  Patient admitted under observation status.      PCP: Aaron Narayan

## 2024-02-17 NOTE — PLAN OF CARE
Brief Plan of Care Update     PT is a 72 YO  female with PMH notable for HTN, HLD who was admitted to hospital medicine earlier this AM for management of CAP.    Pt seen with  at bedside. Reports some nausea and continued nonproductive cough. Denies CP, SOB.  Currently on 2L NC O2, does not use oxygen at home.     Labs reviewed and notable for: WBC 11, D dimer 1.4, Cr 0.7    Will continue empiric treatment for CAP with Azithro + Rocephin  Although low suspicion for PE, given elevated d-dimer, hypoxia and mild tachycardia on admission, will obtain CTA Chest to r/o PE.   Wean supplemental O2 as able       Full note to follow in AM     Rosalinda Sanders MD   [facility] Medicine

## 2024-02-17 NOTE — ED PROVIDER NOTES
Encounter Date: 2/16/2024       History     Chief Complaint   Patient presents with    Fatigue     With fever at home for 2 days. + nausea      Approximately 3rd or 4th day of symptoms, cough, fever and chills at home, maximum temperature recorded 101, short of breath, post-tussive nausea, weakness, fatigue, mild dyspnea.  No vomiting or diarrhea.  No chest pain or lower extremity swelling.  Similar symptoms last fall with outpatient pneumonia.  COVID once about 2 years ago, had only initial 2 doses of COVID vaccine but no booster and no flu vaccine this season.  No history of heart disease or lung disease, nonsmoker.  On arrival, oxygen saturation on room air ranging from 87-92%.  No other complaints.    The history is provided by the patient and the spouse. No  was used.     Review of patient's allergies indicates:   Allergen Reactions    Amitriptyline     Amoxicillin-pot clavulanate Other (See Comments)    Sulfa (sulfonamide antibiotics)      Past Medical History:   Diagnosis Date    Chronic headaches     Depression     Diverticulitis     GERD (gastroesophageal reflux disease)     Hypercholesteremia     Hypertension     Migraine headache      Past Surgical History:   Procedure Laterality Date    ABDOMINAL SURGERY      APPENDECTOMY      CATARACT EXTRACTION      EYE SURGERY      HYSTERECTOMY      TONSILLECTOMY       History reviewed. No pertinent family history.  Social History     Tobacco Use    Smoking status: Former    Smokeless tobacco: Never   Substance Use Topics    Alcohol use: No    Drug use: No     Review of Systems   Constitutional:  Positive for chills and fever. Negative for activity change and fatigue.   HENT:  Negative for congestion, ear pain, facial swelling, nosebleeds, sinus pressure and sore throat.    Eyes:  Negative for pain, discharge, redness and visual disturbance.   Respiratory:  Positive for cough and shortness of breath. Negative for choking, chest tightness and  wheezing.    Cardiovascular:  Negative for chest pain, palpitations and leg swelling.   Gastrointestinal:  Positive for nausea. Negative for abdominal distention, abdominal pain and vomiting.   Endocrine: Negative for heat intolerance, polydipsia and polyuria.   Genitourinary:  Negative for difficulty urinating, dysuria, flank pain, hematuria and urgency.   Musculoskeletal:  Negative for back pain, gait problem, joint swelling and myalgias.   Skin:  Negative for color change and rash.   Allergic/Immunologic: Negative for environmental allergies and food allergies.   Neurological:  Negative for dizziness, weakness, numbness and headaches.   Hematological:  Negative for adenopathy. Does not bruise/bleed easily.   Psychiatric/Behavioral:  Negative for agitation and behavioral problems. The patient is not nervous/anxious.    All other systems reviewed and are negative.      Physical Exam     Initial Vitals [02/16/24 2112]   BP Pulse Resp Temp SpO2   110/60 106 (!) 26 99.1 °F (37.3 °C) (!) 87 %      MAP       --         Physical Exam    Nursing note reviewed.  Constitutional: She appears well-developed and well-nourished. She is not diaphoretic. She appears distressed.   Mildly ill-appearing, mild dyspnea, mild tachypnea, mild cough, mild tachycardia, borderline temp, mild hypoxemia   HENT:   Head: Normocephalic and atraumatic.   Mouth/Throat: No oropharyngeal exudate.   Eyes: Conjunctivae and EOM are normal. Pupils are equal, round, and reactive to light. Right eye exhibits no discharge. Left eye exhibits no discharge. No scleral icterus.   Neck: Neck supple. No thyromegaly present. No tracheal deviation present. No JVD present.   Normal range of motion.  Cardiovascular:  Regular rhythm, normal heart sounds and intact distal pulses.     Exam reveals no gallop and no friction rub.       No murmur heard.  Mild tachycardia   Pulmonary/Chest: No stridor. No respiratory distress. She has no wheezes. She has rhonchi. She has  no rales. She exhibits no tenderness.   Scattered mild rhonchi, right greater than left   Abdominal: Abdomen is soft. Bowel sounds are normal. She exhibits no distension and no mass. There is no abdominal tenderness. There is no rebound and no guarding.   Musculoskeletal:         General: No tenderness or edema. Normal range of motion.      Cervical back: Normal range of motion and neck supple.     Neurological: She is alert and oriented to person, place, and time. She has normal strength.   Skin: Skin is warm and dry. No rash and no abscess noted. No erythema.   Psychiatric: She has a normal mood and affect. Her behavior is normal. Judgment and thought content normal.         ED Course   Procedures  Labs Reviewed   CBC W/ AUTO DIFFERENTIAL - Abnormal; Notable for the following components:       Result Value    RBC 3.82 (*)     Hemoglobin 10.9 (*)     Hematocrit 33.2 (*)     Gran # (ANC) 9.1 (*)     Immature Grans (Abs) 0.06 (*)     Lymph # 0.6 (*)     Gran % 82.6 (*)     Lymph % 5.2 (*)     All other components within normal limits   COMPREHENSIVE METABOLIC PANEL - Abnormal; Notable for the following components:    CO2 21 (*)     Glucose 149 (*)     Albumin 2.9 (*)     All other components within normal limits   D DIMER, QUANTITATIVE - Abnormal; Notable for the following components:    D-Dimer 1.40 (*)     All other components within normal limits   ISTAT PROCEDURE - Abnormal; Notable for the following components:    POC PH 7.456 (*)     POC PO2 56 (*)     All other components within normal limits   CULTURE, BLOOD   CULTURE, BLOOD   LACTIC ACID, PLASMA   TROPONIN I   B-TYPE NATRIURETIC PEPTIDE   URINALYSIS, REFLEX TO URINE CULTURE   POCT INFLUENZA A/B MOLECULAR   SARS-COV-2 RDRP GENE     EKG Readings: (Independently Interpreted)   Initial Reading: No STEMI. Rhythm: Sinus Tachycardia. Heart Rate: 103.   Sinus tachycardia 103 beats per minute, nonspecific ST and T-wave abnormality, no acute concerning change.   Compared to previous, rate is higher.       Imaging Results              X-Ray Chest PA And Lateral (Final result)  Result time 02/16/24 22:58:06      Final result by Dominique Shirley MD (02/16/24 22:58:06)                   Impression:      Bilateral mixed interstitial and airspace pulmonary opacity      Electronically signed by: Dominique Shirley  Date:    02/16/2024  Time:    22:58               Narrative:    EXAMINATION:  XR CHEST PA AND LATERAL    CLINICAL HISTORY:  Chest Pain;    TECHNIQUE:  PA and lateral views of the chest were performed.    COMPARISON:  10/07/2020    FINDINGS:  There is right midlung interstitial and hazy airspace opacity.  There is lesser left lung peripheral interstitial opacity.  No sizable pleural effusion.  Mediastinum rotated                        Wet Read by Juaquin Headley MD (02/16/24 22:27:32, [facility] - Emergency Dept, Emergency Medicine)    RUL pneumonia                                       Medications   ibuprofen tablet 600 mg (600 mg Oral Given 2/16/24 2126)   cefTRIAXone (Rocephin) 1 g in dextrose 5 % in water (D5W) 100 mL IVPB (MB+) (0 g Intravenous Stopped 2/16/24 2342)   azithromycin (ZITHROMAX) 500 mg in dextrose 5 % (D5W) 250 mL IVPB (Vial-Mate) (0 mg Intravenous Stopped 2/17/24 0008)   ondansetron injection 4 mg (4 mg Intravenous Given 2/16/24 2251)   lactated ringers bolus 500 mL (0 mLs Intravenous Stopped 2/17/24 0007)   lactated ringers infusion (1,000 mLs Intravenous New Bag 2/16/24 2341)       10:32 PM No change.  Counseled patient and family.  Community-acquired pneumonia, not septic, negative COVID and flu.  Hypoxemia.  Consult Hospital Medicine.    Medical Decision Making  Problems Addressed:  Hypoxemia: acute illness or injury  Pneumonia of right upper lobe due to infectious organism: acute illness or injury    Amount and/or Complexity of Data Reviewed  Labs: ordered. Decision-making details documented in ED Course.  Radiology: ordered and independent  interpretation performed. Decision-making details documented in ED Course.  ECG/medicine tests: ordered and independent interpretation performed. Decision-making details documented in ED Course.    Risk  OTC drugs.  Prescription drug management.  Decision regarding hospitalization.      Additional MDM:   Differential Diagnosis:   Pneumonia, COVID, flu, other causes of respiratory infection and hypoxemia                                    Clinical Impression:  Final diagnoses:  [R06.02] SOB (shortness of breath)  [R05.1] Acute cough  [R09.02] Hypoxemia  [J18.9] Pneumonia of right upper lobe due to infectious organism (Primary)          ED Disposition Condition    Observation Stable                Juaquin Headley MD  02/16/24 0835       Juaquin Headley MD  02/17/24 0019

## 2024-02-18 PROBLEM — R93.89 ABNORMAL CT OF THE CHEST: Status: ACTIVE | Noted: 2024-02-18

## 2024-02-18 LAB
ANION GAP SERPL CALC-SCNC: 11 MMOL/L (ref 8–16)
AORTIC ROOT ANNULUS: 3.19 CM
ASCENDING AORTA: 3.47 CM
AV INDEX (PROSTH): 0.8
AV MEAN GRADIENT: 6 MMHG
AV PEAK GRADIENT: 10 MMHG
AV REGURGITATION PRESSURE HALF TIME: 372.12 MS
AV VALVE AREA BY VELOCITY RATIO: 3.04 CM²
AV VALVE AREA: 3.01 CM²
AV VELOCITY RATIO: 0.81
BASOPHILS # BLD AUTO: 0.06 K/UL (ref 0–0.2)
BASOPHILS NFR BLD: 0.7 % (ref 0–1.9)
BSA FOR ECHO PROCEDURE: 1.71 M2
BUN SERPL-MCNC: 11 MG/DL (ref 8–23)
CALCIUM SERPL-MCNC: 9.6 MG/DL (ref 8.7–10.5)
CHLORIDE SERPL-SCNC: 106 MMOL/L (ref 95–110)
CO2 SERPL-SCNC: 24 MMOL/L (ref 23–29)
CREAT SERPL-MCNC: 0.7 MG/DL (ref 0.5–1.4)
CV ECHO LV RWT: 0.39 CM
DIFFERENTIAL METHOD BLD: ABNORMAL
DOP CALC AO PEAK VEL: 1.62 M/S
DOP CALC AO VTI: 34.3 CM
DOP CALC LVOT AREA: 3.8 CM2
DOP CALC LVOT DIAMETER: 2.19 CM
DOP CALC LVOT PEAK VEL: 1.31 M/S
DOP CALC LVOT STROKE VOLUME: 103.16 CM3
DOP CALC RVOT PEAK VEL: 0.62 M/S
DOP CALC RVOT VTI: 15 CM
DOP CALCLVOT PEAK VEL VTI: 27.4 CM
E WAVE DECELERATION TIME: 164.89 MSEC
E/A RATIO: 0.98
E/E' RATIO: 8.26 M/S
ECHO LV POSTERIOR WALL: 0.87 CM (ref 0.6–1.1)
EOSINOPHIL # BLD AUTO: 0.6 K/UL (ref 0–0.5)
EOSINOPHIL NFR BLD: 7.4 % (ref 0–8)
ERYTHROCYTE [DISTWIDTH] IN BLOOD BY AUTOMATED COUNT: 13.6 % (ref 11.5–14.5)
EST. GFR  (NO RACE VARIABLE): >60 ML/MIN/1.73 M^2
FRACTIONAL SHORTENING: 29 % (ref 28–44)
GLUCOSE SERPL-MCNC: 90 MG/DL (ref 70–110)
HCT VFR BLD AUTO: 29.8 % (ref 37–48.5)
HGB BLD-MCNC: 10 G/DL (ref 12–16)
IMM GRANULOCYTES # BLD AUTO: 0.06 K/UL (ref 0–0.04)
IMM GRANULOCYTES NFR BLD AUTO: 0.7 % (ref 0–0.5)
INTERVENTRICULAR SEPTUM: 0.93 CM (ref 0.6–1.1)
IVRT: 62.8 MSEC
LA MAJOR: 5 CM
LA MINOR: 3.5 CM
LA WIDTH: 4.9 CM
LEFT ATRIUM SIZE: 3.4 CM
LEFT ATRIUM VOLUME INDEX: 34.3 ML/M2
LEFT ATRIUM VOLUME: 58.31 CM3
LEFT INTERNAL DIMENSION IN SYSTOLE: 3.21 CM (ref 2.1–4)
LEFT VENTRICLE DIASTOLIC VOLUME INDEX: 54.62 ML/M2
LEFT VENTRICLE DIASTOLIC VOLUME: 92.86 ML
LEFT VENTRICLE MASS INDEX: 78 G/M2
LEFT VENTRICLE SYSTOLIC VOLUME INDEX: 24.3 ML/M2
LEFT VENTRICLE SYSTOLIC VOLUME: 41.31 ML
LEFT VENTRICULAR INTERNAL DIMENSION IN DIASTOLE: 4.51 CM (ref 3.5–6)
LEFT VENTRICULAR MASS: 133.31 G
LV LATERAL E/E' RATIO: 6.33 M/S
LV SEPTAL E/E' RATIO: 11.88 M/S
LVOT MG: 3.53 MMHG
LVOT MV: 0.87 CM/S
LYMPHOCYTES # BLD AUTO: 1.1 K/UL (ref 1–4.8)
LYMPHOCYTES NFR BLD: 13.9 % (ref 18–48)
MCH RBC QN AUTO: 28.8 PG (ref 27–31)
MCHC RBC AUTO-ENTMCNC: 33.6 G/DL (ref 32–36)
MCV RBC AUTO: 86 FL (ref 82–98)
MONOCYTES # BLD AUTO: 0.9 K/UL (ref 0.3–1)
MONOCYTES NFR BLD: 10.7 % (ref 4–15)
MV PEAK A VEL: 0.97 M/S
MV PEAK E VEL: 0.95 M/S
MV STENOSIS PRESSURE HALF TIME: 47.82 MS
MV VALVE AREA P 1/2 METHOD: 4.6 CM2
NEUTROPHILS # BLD AUTO: 5.4 K/UL (ref 1.8–7.7)
NEUTROPHILS NFR BLD: 66.6 % (ref 38–73)
NRBC BLD-RTO: 0 /100 WBC
OHS QRS DURATION: 84 MS
OHS QTC CALCULATION: 484 MS
PISA AR MAX VEL: 4.64 M/S
PISA TR MAX VEL: 2.68 M/S
PLATELET # BLD AUTO: 241 K/UL (ref 150–450)
PMV BLD AUTO: 10.2 FL (ref 9.2–12.9)
POTASSIUM SERPL-SCNC: 3.7 MMOL/L (ref 3.5–5.1)
PULM VEIN S/D RATIO: 1.6
PV MEAN GRADIENT: 1 MMHG
PV MV: 0.7 M/S
PV PEAK D VEL: 0.4 M/S
PV PEAK GRADIENT: 4 MMHG
PV PEAK S VEL: 0.64 M/S
PV PEAK VELOCITY: 0.94 M/S
RA MAJOR: 5.04 CM
RA PRESSURE ESTIMATED: 3 MMHG
RA WIDTH: 3.5 CM
RBC # BLD AUTO: 3.47 M/UL (ref 4–5.4)
RHEUMATOID FACT SERPL-ACNC: <13 IU/ML (ref 0–15)
RIGHT VENTRICULAR END-DIASTOLIC DIMENSION: 2.89 CM
RV TB RVSP: 6 MMHG
SODIUM SERPL-SCNC: 141 MMOL/L (ref 136–145)
STJ: 2.5 CM
TDI LATERAL: 0.15 M/S
TDI SEPTAL: 0.08 M/S
TDI: 0.12 M/S
TR MAX PG: 29 MMHG
TRICUSPID ANNULAR PLANE SYSTOLIC EXCURSION: 1.87 CM
TV REST PULMONARY ARTERY PRESSURE: 32 MMHG
WBC # BLD AUTO: 8.13 K/UL (ref 3.9–12.7)
Z-SCORE OF LEFT VENTRICULAR DIMENSION IN END DIASTOLE: -0.47
Z-SCORE OF LEFT VENTRICULAR DIMENSION IN END SYSTOLE: 0.73

## 2024-02-18 PROCEDURE — 63600175 PHARM REV CODE 636 W HCPCS: Performed by: NURSE PRACTITIONER

## 2024-02-18 PROCEDURE — 25000003 PHARM REV CODE 250: Performed by: NURSE PRACTITIONER

## 2024-02-18 PROCEDURE — 86431 RHEUMATOID FACTOR QUANT: CPT | Performed by: NURSE PRACTITIONER

## 2024-02-18 PROCEDURE — 94618 PULMONARY STRESS TESTING: CPT

## 2024-02-18 PROCEDURE — 27000221 HC OXYGEN, UP TO 24 HOURS

## 2024-02-18 PROCEDURE — 36415 COLL VENOUS BLD VENIPUNCTURE: CPT | Mod: XB | Performed by: NURSE PRACTITIONER

## 2024-02-18 PROCEDURE — 87449 NOS EACH ORGANISM AG IA: CPT | Performed by: NURSE PRACTITIONER

## 2024-02-18 PROCEDURE — 25000242 PHARM REV CODE 250 ALT 637 W/ HCPCS: Performed by: INTERNAL MEDICINE

## 2024-02-18 PROCEDURE — 11000001 HC ACUTE MED/SURG PRIVATE ROOM

## 2024-02-18 PROCEDURE — 86036 ANCA SCREEN EACH ANTIBODY: CPT | Performed by: NURSE PRACTITIONER

## 2024-02-18 PROCEDURE — 99900035 HC TECH TIME PER 15 MIN (STAT)

## 2024-02-18 PROCEDURE — 94761 N-INVAS EAR/PLS OXIMETRY MLT: CPT

## 2024-02-18 PROCEDURE — 36415 COLL VENOUS BLD VENIPUNCTURE: CPT | Performed by: INTERNAL MEDICINE

## 2024-02-18 PROCEDURE — 85025 COMPLETE CBC W/AUTO DIFF WBC: CPT | Performed by: INTERNAL MEDICINE

## 2024-02-18 PROCEDURE — 86039 ANTINUCLEAR ANTIBODIES (ANA): CPT | Performed by: NURSE PRACTITIONER

## 2024-02-18 PROCEDURE — 86235 NUCLEAR ANTIGEN ANTIBODY: CPT | Mod: 59 | Performed by: NURSE PRACTITIONER

## 2024-02-18 PROCEDURE — 25000003 PHARM REV CODE 250: Performed by: INTERNAL MEDICINE

## 2024-02-18 PROCEDURE — 86635 COCCIDIOIDES ANTIBODY: CPT | Performed by: NURSE PRACTITIONER

## 2024-02-18 PROCEDURE — 80048 BASIC METABOLIC PNL TOTAL CA: CPT | Performed by: INTERNAL MEDICINE

## 2024-02-18 PROCEDURE — 63600175 PHARM REV CODE 636 W HCPCS: Performed by: INTERNAL MEDICINE

## 2024-02-18 PROCEDURE — 86038 ANTINUCLEAR ANTIBODIES: CPT | Performed by: NURSE PRACTITIONER

## 2024-02-18 RX ORDER — PREDNISONE 10 MG/1
10 TABLET ORAL DAILY
Status: DISCONTINUED | OUTPATIENT
Start: 2024-03-04 | End: 2024-02-19 | Stop reason: HOSPADM

## 2024-02-18 RX ORDER — PREDNISONE 20 MG/1
40 TABLET ORAL DAILY
Status: DISCONTINUED | OUTPATIENT
Start: 2024-02-18 | End: 2024-02-19 | Stop reason: HOSPADM

## 2024-02-18 RX ORDER — PREDNISONE 20 MG/1
20 TABLET ORAL DAILY
Status: DISCONTINUED | OUTPATIENT
Start: 2024-02-28 | End: 2024-02-19 | Stop reason: HOSPADM

## 2024-02-18 RX ORDER — FLUTICASONE PROPIONATE 50 MCG
2 SPRAY, SUSPENSION (ML) NASAL DAILY
Status: DISCONTINUED | OUTPATIENT
Start: 2024-02-18 | End: 2024-02-19 | Stop reason: HOSPADM

## 2024-02-18 RX ADMIN — PANTOPRAZOLE SODIUM 40 MG: 40 TABLET, DELAYED RELEASE ORAL at 09:02

## 2024-02-18 RX ADMIN — AZITHROMYCIN MONOHYDRATE 500 MG: 500 INJECTION, POWDER, LYOPHILIZED, FOR SOLUTION INTRAVENOUS at 12:02

## 2024-02-18 RX ADMIN — ONDANSETRON 4 MG: 2 INJECTION INTRAMUSCULAR; INTRAVENOUS at 09:02

## 2024-02-18 RX ADMIN — SERTRALINE HYDROCHLORIDE 100 MG: 50 TABLET ORAL at 09:02

## 2024-02-18 RX ADMIN — ENOXAPARIN SODIUM 40 MG: 40 INJECTION SUBCUTANEOUS at 05:02

## 2024-02-18 RX ADMIN — TRAZODONE HYDROCHLORIDE 100 MG: 100 TABLET ORAL at 09:02

## 2024-02-18 RX ADMIN — BENZONATATE 100 MG: 100 CAPSULE ORAL at 09:02

## 2024-02-18 RX ADMIN — BUTALBITAL, ACETAMINOPHEN, AND CAFFEINE 1 TABLET: 325; 50; 40 TABLET ORAL at 05:02

## 2024-02-18 RX ADMIN — AZITHROMYCIN MONOHYDRATE 500 MG: 500 INJECTION, POWDER, LYOPHILIZED, FOR SOLUTION INTRAVENOUS at 11:02

## 2024-02-18 RX ADMIN — PREDNISONE 40 MG: 20 TABLET ORAL at 10:02

## 2024-02-18 RX ADMIN — IBUPROFEN 400 MG: 400 TABLET, FILM COATED ORAL at 10:02

## 2024-02-18 RX ADMIN — ATORVASTATIN CALCIUM 80 MG: 40 TABLET, FILM COATED ORAL at 09:02

## 2024-02-18 RX ADMIN — FLUTICASONE PROPIONATE 100 MCG: 50 SPRAY, METERED NASAL at 10:02

## 2024-02-18 RX ADMIN — BUTALBITAL, ACETAMINOPHEN, AND CAFFEINE 1 TABLET: 325; 50; 40 TABLET ORAL at 12:02

## 2024-02-18 RX ADMIN — AMLODIPINE BESYLATE 10 MG: 10 TABLET ORAL at 06:02

## 2024-02-18 RX ADMIN — CETIRIZINE HYDROCHLORIDE 10 MG: 10 TABLET, FILM COATED ORAL at 09:02

## 2024-02-18 RX ADMIN — Medication 6 MG: at 09:02

## 2024-02-18 NOTE — ASSESSMENT & PLAN NOTE
CTA chest negative for PE but showed R > L interstitial lung disease and GGO concern for infectious vs. Inflammatory process  Pulmonology consulted- case discussed with pulm team, CT imaging personally reviewed. Pt likely has component of underlying insterstitial lung disease  Continue abx as above  Started on steroid taper   Outpatient pulm followup   Home O2 eval

## 2024-02-18 NOTE — PROGRESS NOTES
[facility] - Med Surg 3  [facility] Medicine  Progress Note    Patient Name: Gaby Stanley  MRN: [id number]  Patient Class: IP- Inpatient   Admission Date: 2/16/2024  Length of Stay: 1 days  Attending Physician: Rosalinda Sanders MD  Primary Care Provider: Aaron Narayan MD        Subjective:     Principal Problem:CAP (community acquired pneumonia)        HPI:  Gaby Stanley is a 73 y.o. female with a PMH  has a past medical history of Chronic headaches, Depression, Diverticulitis, GERD (gastroesophageal reflux disease), Hypercholesteremia, Hypertension, and Migraine headache. Presents as a transfer from our [facility] for further evaluation/treatment for CAP.  Patient initially presented to that facility for worsening nonproductive cough which started last week with associated generalized weakness/fatigue.  Patient states that yesterday she began experiencing fever, chills, sweats, and nausea.  Denies any relief with over-the-counter Robitussin and Mucinex DM.  Patient reports relief with home Phenergan for her nausea.  Denies any actual chest pain, headache, lightheadedness, dizziness, abdominal pain common bladder/bowel complaints, or any other symptoms at this time.    ER workup revealed D-dimer of 1.4, CBG of 149 mg/dL, H/H of 10.9/33.2, flu/COVID negative, UA negative, BNP and troponin negative, lactic acid negative, ABG revealed pH of 7.456, pCO2 of 35.3, PO2 of 56, and HC03 of 24.9.  Chest x-ray revealed:[Bilateral mixed interstitial and airspace pulmonary opacit].  EKG revealed sinus tachycardia with a ventricular rate of 103 beats per minute with a QT/QTC of 370/44.  Patient received 1 g of Rocephin and 500 mg of azithromycin at outside facility.  Patient also received 600 mg of ibuprofen, 1.5 L of lactated Ringer's, 4 mg of Zofran.  Hospital Medicine consulted to admit patient for CAP.  Patient in agreement with treatment plan.  Patient admitted under observation status.      PCP: Helene  Aaron      Overview/Hospital Course:  Continued on empiric treatment for CAP with Azithro + Rocephin. Although low suspicion for PE, given elevated d-dimer, hypoxia and mild tachycardia on admission, will obtain CTA Chest to r/o PE. CTA chest was negative for PE but showed diffuse GGO in R lung with underlying interstitial lung disease. Pulmonology consulted for evaluation.     Interval History: NAEON. Pt seen and examined, daughter and spouse at bedside. Still on 2L NC O2, continues to have dry nonproductive cough. Denies CP. Pulm consulted, pt started on steroid taper.     Review of Systems  Objective:     Vital Signs (Most Recent):  Temp: 97.9 °F (36.6 °C) (02/18/24 0746)  Pulse: 68 (02/18/24 0800)  Resp: 18 (02/18/24 0746)  BP: 109/61 (02/18/24 0746)  SpO2: (!) 94 % (02/18/24 0800) Vital Signs (24h Range):  Temp:  [97.7 °F (36.5 °C)-98.6 °F (37 °C)] 97.9 °F (36.6 °C)  Pulse:  [66-91] 68  Resp:  [18-20] 18  SpO2:  [91 %-94 %] 94 %  BP: (107-132)/(51-69) 109/61     Weight: 63.8 kg (140 lb 10.5 oz)  Body mass index is 23.41 kg/m².  No intake or output data in the 24 hours ending 02/18/24 1002      Physical Exam  Vitals and nursing note reviewed.   Constitutional:       General: She is not in acute distress.     Appearance: Ill appearance: thin, chronically ill appearing.   Cardiovascular:      Rate and Rhythm: Normal rate and regular rhythm.      Heart sounds: No murmur heard.     No friction rub. No gallop.   Pulmonary:      Effort: Pulmonary effort is normal.      Breath sounds: Normal breath sounds. No wheezing or rales.      Comments: On 2L NC O2   Abdominal:      General: Bowel sounds are normal. There is no distension.      Palpations: Abdomen is soft.      Tenderness: There is no abdominal tenderness.   Musculoskeletal:      Right lower leg: No edema.      Left lower leg: No edema.   Neurological:      Mental Status: She is alert and oriented to person, place, and time. Mental status is at baseline.              Significant Labs: All pertinent labs within the past 24 hours have been reviewed.    Significant Imaging: I have reviewed all pertinent imaging results/findings within the past 24 hours.    Assessment/Plan:      * CAP (community acquired pneumonia)  Patient with current diagnosis of pneumonia due to unspecified organism which is controlled. I have reviewed the pertinent imaging. Current antimicrobial regimen consists of   Antibiotics (From admission, onward)      Start     Stop Route Frequency Ordered    02/17/24 2300  cefTRIAXone (Rocephin) 1 g in dextrose 5 % in water (D5W) 100 mL IVPB (MB+)         -- IV Every 24 hours (non-standard times) 02/17/24 0648    02/17/24 2300  azithromycin (ZITHROMAX) 500 mg in dextrose 5 % (D5W) 250 mL IVPB (Vial-Mate)         -- IV Every 24 hours (non-standard times) 02/17/24 0648        . Cultures drawn and noted-   Microbiology Results (last 7 days)       Procedure Component Value Units Date/Time    Culture, Respiratory with Gram Stain [[id number]]     Order Status: No result Specimen: Respiratory from Endotracheal Aspirate     Fungus culture [[id number]]     Order Status: No result Specimen: Sputum     Blood Culture #1 **CANNOT BE ORDERED STAT** [[id number]] Collected: 02/16/24 2143    Order Status: Completed Specimen: Blood from Peripheral, Antecubital, Right Updated: 02/18/24 0613     Blood Culture, Routine No Growth to date      No Growth to date    Blood Culture #2 **CANNOT BE ORDERED STAT** [[id number]] Collected: 02/16/24 2144    Order Status: Completed Specimen: Blood from Peripheral, Antecubital, Left Updated: 02/18/24 0613     Blood Culture, Routine No Growth to date      No Growth to date         Will monitor patient closely and continue current treatment plan unchanged.        Abnormal CT of the chest  CTA chest negative for PE but showed R > L interstitial lung disease and GGO concern for infectious vs. Inflammatory process  Pulmonology consulted- case discussed  with pulm team, CT imaging personally reviewed. Pt likely has component of underlying insterstitial lung disease  Continue abx as above  Started on steroid taper   Outpatient pulm followup   Home O2 eval       Cough  Cont Tessalon PRN   Add flonase for post nasal drip   Antibiotics and steroids as abobve       GERD (gastroesophageal reflux disease)  Chronic. Stable. Currently asymptomatic. Home medications include PPI/Antacids as needed.  Plan:  -Continue PPI/Antacids as needed         Hypertension  Chronic, controlled. Latest blood pressure and vitals reviewed-     Temp:  [97.7 °F (36.5 °C)-98.6 °F (37 °C)]   Pulse:  [66-91]   Resp:  [18-20]   BP: (107-132)/(51-69)   SpO2:  [91 %-94 %] .   Home meds for hypertension were reviewed and noted below.   Hypertension Medications               amLODIPine (NORVASC) 10 MG tablet Take 1 tablet by mouth every morning.            While in the hospital, will manage blood pressure as follows; Continue home antihypertensive regimen    Will utilize p.r.n. blood pressure medication only if patient's blood pressure greater than 160/100 and she develops symptoms such as worsening chest pain or shortness of breath.    Hyperlipidemia LDL goal <130  Continue home statin         Migraine  Plan:  -continue home medications as needed      Anxiety  Chronic. Stable.   -Continue home medications (zoloft)          VTE Risk Mitigation (From admission, onward)           Ordered     enoxaparin injection 40 mg  Daily         02/17/24 0650     IP VTE HIGH RISK PATIENT  Once         02/17/24 0650     Place sequential compression device  Until discontinued         02/17/24 0650                    Discharge Planning   TAB:      Code Status: Not on file   Is the patient medically ready for discharge?: No    Reason for patient still in hospital (select all that apply): Patient trending condition and Treatment                     Rosalinda Sanders MD  Department of Hospital Medicine   [facility] - Med Surg  3

## 2024-02-18 NOTE — ASSESSMENT & PLAN NOTE
Chronic, controlled. Latest blood pressure and vitals reviewed-     Temp:  [97.7 °F (36.5 °C)-98.6 °F (37 °C)]   Pulse:  [66-91]   Resp:  [18-20]   BP: (107-132)/(51-69)   SpO2:  [91 %-94 %] .   Home meds for hypertension were reviewed and noted below.   Hypertension Medications               amLODIPine (NORVASC) 10 MG tablet Take 1 tablet by mouth every morning.            While in the hospital, will manage blood pressure as follows; Continue home antihypertensive regimen    Will utilize p.r.n. blood pressure medication only if patient's blood pressure greater than 160/100 and she develops symptoms such as worsening chest pain or shortness of breath.

## 2024-02-18 NOTE — PLAN OF CARE
Bed locked, in lowest position. Call bell in reach. Purposeful rounding done every two hours. Cardiac monitoring in use. Chart check complete. Will continue with plan of care.

## 2024-02-18 NOTE — SUBJECTIVE & OBJECTIVE
Past Medical History:   Diagnosis Date    Chronic headaches     Depression     Diverticulitis     GERD (gastroesophageal reflux disease)     Hypercholesteremia     Hypertension     Migraine headache      Past Surgical History:   Procedure Laterality Date    ABDOMINAL SURGERY      APPENDECTOMY      CATARACT EXTRACTION      EYE SURGERY      HYSTERECTOMY      TONSILLECTOMY       Review of patient's allergies indicates:   Allergen Reactions    Amitriptyline     Amoxicillin-pot clavulanate Other (See Comments)    Sulfa (sulfonamide antibiotics)      Family History    None       Tobacco Use    Smoking status: Former    Smokeless tobacco: Never   Substance and Sexual Activity    Alcohol use: No    Drug use: No    Sexual activity: Not on file       Review of Systems: Negative except as indicated in HPI  Objective:     Vital Signs (Most Recent):  Temp: 97.9 °F (36.6 °C) (02/18/24 0746)  Pulse: 68 (02/18/24 0800)  Resp: 18 (02/18/24 0746)  BP: 109/61 (02/18/24 0746)  SpO2: (!) 94 % (02/18/24 0800) Vital Signs (24h Range):  Temp:  [97.7 °F (36.5 °C)-98.6 °F (37 °C)] 97.9 °F (36.6 °C)  Pulse:  [66-91] 68  Resp:  [18-20] 18  SpO2:  [91 %-94 %] 94 %  BP: (107-132)/(51-69) 109/61   Weight: 63.8 kg (140 lb 10.5 oz);  Body mass index is 23.41 kg/m².    No intake or output data in the 24 hours ending 02/18/24 0916     Physical Exam  Vitals and nursing note reviewed.   HENT:      Head: Normocephalic.   Eyes:      Conjunctiva/sclera: Conjunctivae normal.   Cardiovascular:      Rate and Rhythm: Normal rate.   Pulmonary:      Effort: Pulmonary effort is normal.      Breath sounds: Rales present.   Abdominal:      Palpations: Abdomen is soft.   Musculoskeletal:         General: Normal range of motion.      Cervical back: Normal range of motion and neck supple.   Skin:     General: Skin is warm and dry.   Neurological:      Mental Status: She is alert and oriented to person, place, and time.   Psychiatric:         Mood and Affect: Mood  normal.     : Negative except as indicated in HPI     Significant Labs:  CBC/Anemia Profile:  Recent Labs   Lab 02/16/24 2143 02/18/24  0434   WBC 11.08 8.13   HGB 10.9* 10.0*   HCT 33.2* 29.8*    241   MCV 87 86   RDW 13.6 13.6   Chemistries:  Recent Labs   Lab 02/16/24 2143 02/18/24  0433    141   K 3.5 3.7    106   CO2 21* 24   BUN 15 11   CREATININE 0.7 0.7   CALCIUM 9.0 9.6   ALBUMIN 2.9*  --    PROT 7.0  --    BILITOT 0.3  --    ALKPHOS 130  --    ALT 10  --    AST 24  --    Significant Imaging:   CT: I have reviewed all pertinent results/findings within the past 24 hours and my personal findings are:  chronic interstitial changes noted with ground glass opacities right greater than left

## 2024-02-18 NOTE — ASSESSMENT & PLAN NOTE
"Patient with remote smoking history who quit over 40 years ago[place], on chronic Macro-Bid, GERD and reported history of rheumatoid arthritis not on chronic meds. She has had recurrent reported "upper respiratory infections" treated with Levaquin and prednisone. She reports her cough did stop after completion of her antibiotics and steroids.     Add Prednisone taper  Check fungal immuno studies  Check fungitell  Check sputum culture with fungal sputum culture  Check KYE, ANCA, rheumatoid factor  Follow imaging and ABGs as needed  Needs outpatient pulmonary follow-up upon discharge  Referral sent to clinic and message sent to office to schedule appointment   "

## 2024-02-18 NOTE — PLAN OF CARE
[facility] - Med Surg 3  Initial Discharge Assessment       Primary Care Provider: Aaron Narayan MD    Admission Diagnosis: Hypoxemia [R09.02]  SOB (shortness of breath) [R06.02]  Pneumonia of right upper lobe due to infectious organism [J18.9]  Acute cough [R05.1]  CAP (community acquired pneumonia) [J18.9]    Admission Date: 2/16/2024  Expected Discharge Date:     Transition of Care Barriers: None    Payor: [employer] MEDICARE / Plan: [employer] MEDICARE HMO / Product Type: Capitation /     Extended Emergency Contact Information  Primary Emergency Contact: Jose Stanley   [place]  Mobile Phone: [phone]  Relation: Spouse    Discharge Plan A: Home with family, Home Health         [facility] - [place], [place] - [address]  [address]  [place] [address]  Phone: [phone] Fax: [phone]    [facility] [place] [address]  [address]  [address]  [place] [address]  Phone: [phone] Fax: [phone]      Initial Assessment (most recent)       Adult Discharge Assessment - 02/18/24 1134          Discharge Assessment    Assessment Type Discharge Planning Assessment     Confirmed/corrected address, phone number and insurance Yes     Confirmed Demographics Correct on Facesheet     Source of Information patient     Communicated TAB with patient/caregiver Date not available/Unable to determine     Reason For Admission CAP (community acquired pneumonia)     People in Home spouse     Do you expect to return to your current living situation? Yes     Do you have help at home or someone to help you manage your care at home? Yes     Who are your caregiver(s) and their phone number(s)? Jose Stanley [phone]     Prior to hospitilization cognitive status: Alert/Oriented     Current cognitive status: Alert/Oriented     Walking or Climbing Stairs Difficulty no     Dressing/Bathing Difficulty no     Home Accessibility wheelchair accessible      Home Layout Able to live on 1st floor     Equipment Currently Used at Home walker, rolling;bedside commode;shower chair     Readmission within 30 days? No     Patient currently being followed by outpatient case management? No     Do you currently have service(s) that help you manage your care at home? No     Do you take prescription medications? Yes     Do you have prescription coverage? Yes     Coverage [employer] MANAGED MEDICARE - [employer] MEDICARE O     Do you have any problems affording any of your prescribed medications? Yes     If yes, what medications? Fiornal     Is the patient taking medications as prescribed? yes     Who is going to help you get home at discharge? Jose Stanley [phone] (P)      How do you get to doctors appointments? family or friend will provide     Are you on dialysis? No     Do you take coumadin? No     Discharge Plan A Home with family;Home Health     DME Needed Upon Discharge  oxygen     Discharge Plan discussed with: Patient     Transition of Care Barriers None        OTHER    Name(s) of People in Home Jose Stanley [phone] (P)                         Monica Barakat LMSW 2/18/2024 11:48 AM

## 2024-02-18 NOTE — HPI
Gaby Stanley is a 73-year-old female with a past medical history of chronic headaches, depression, diverticulitis, GERD, hyperlipidemia, hypertension, recurrent UTI, and rheumatoid arthritis. Patient reports a history of chronic cough which is mostly dry in nature. Recent history of recurrent upper respiratory infections which has been ongoing since September 2023. Reports a prior history of COVID in June 2022. [place] She denies any indoor pets. She denies any other home or work exposures. She reports taking MacroBid for approximately one year for recurrent UTIs. She endorses chronic reflux which is well controlled on Protonix. She reports a remote smoking history. She states she began smoking around the age of 15 and quit smoking over 40 years ago. She reports smoking less than a pack per day when she was smoking and reports she intermittently quit smoking during pregnancies. She reports rheumatoid arthritis of the hands bilaterally but not on chronic medications. Patient presented to the emergency room on 2/17 with worsening shortness of breath and cough with associated subjective fever, body aches, and chills. Patient admitted by hospital medicine for the treatment of community acquired pneumonia. CT chest demonstrates chronic interstitial lung changes with ground glass changes right greater than left. Pulmonary has been consulted for evaluation.     Interval history:  2/19: pulmonary status stable on 1L/NC. Persistent dry cough; no acute events overnight.

## 2024-02-18 NOTE — HOSPITAL COURSE
Continued on empiric treatment for CAP with Azithro + Rocephin. Although low suspicion for PE, given elevated d-dimer, hypoxia and mild tachycardia on admission, will obtain CTA Chest to r/o PE. CTA chest was negative for PE but showed diffuse GGO in R lung with underlying interstitial lung disease. Pulmonology consulted for evaluation.

## 2024-02-18 NOTE — SUBJECTIVE & OBJECTIVE
Interval History: NAEON. Pt seen and examined, daughter and spouse at bedside. Still on 2L NC O2, continues to have dry nonproductive cough. Denies CP. Pulm consulted, pt started on steroid taper.     Review of Systems  Objective:     Vital Signs (Most Recent):  Temp: 97.9 °F (36.6 °C) (02/18/24 0746)  Pulse: 68 (02/18/24 0800)  Resp: 18 (02/18/24 0746)  BP: 109/61 (02/18/24 0746)  SpO2: (!) 94 % (02/18/24 0800) Vital Signs (24h Range):  Temp:  [97.7 °F (36.5 °C)-98.6 °F (37 °C)] 97.9 °F (36.6 °C)  Pulse:  [66-91] 68  Resp:  [18-20] 18  SpO2:  [91 %-94 %] 94 %  BP: (107-132)/(51-69) 109/61     Weight: 63.8 kg (140 lb 10.5 oz)  Body mass index is 23.41 kg/m².  No intake or output data in the 24 hours ending 02/18/24 1002      Physical Exam  Vitals and nursing note reviewed.   Constitutional:       General: She is not in acute distress.     Appearance: Ill appearance: thin, chronically ill appearing.   Cardiovascular:      Rate and Rhythm: Normal rate and regular rhythm.      Heart sounds: No murmur heard.     No friction rub. No gallop.   Pulmonary:      Effort: Pulmonary effort is normal.      Breath sounds: Normal breath sounds. No wheezing or rales.      Comments: On 2L NC O2   Abdominal:      General: Bowel sounds are normal. There is no distension.      Palpations: Abdomen is soft.      Tenderness: There is no abdominal tenderness.   Musculoskeletal:      Right lower leg: No edema.      Left lower leg: No edema.   Neurological:      Mental Status: She is alert and oriented to person, place, and time. Mental status is at baseline.             Significant Labs: All pertinent labs within the past 24 hours have been reviewed.    Significant Imaging: I have reviewed all pertinent imaging results/findings within the past 24 hours.

## 2024-02-18 NOTE — RESPIRATORY THERAPY
Home Oxygen Evaluation - [facility] - Cardiopulmonary Department      Date Performed: 2/18/2024      1) Patient's Home O2 Sat on room air, while at rest: Room Air SpO2 At Rest: 91 %        If O2 sats on room air at rest are 88% or below, patient qualifies.  Document O2 liter flow needed in Step 2.  If O2 sats are 89% or above, complete Step 3.        2)  If patient is not ambulated and O2 sats are <88%, what is the O2 liter flow required to meet ordered saturation?      If O2 sats on room air while exercising remain 89% or above patient does not qualify, no further testing needed Document N/A in step 3. If O2 sats on room air while exercising are 88% or below, continue to Step 4.    3) Patient's O2 Sat on room air while exercising: Room Air SpO2 During Ambulation: (!) 84 %        4) Patient's O2 Sat while exercising on O2: SpO2 During Ambulation on O2: 91 % at Ambulation O2 LPM: 2 LPM         (Must show improvement from #4 for patients to qualify)

## 2024-02-18 NOTE — CONSULTS
[facility]  Pulmonology Consult Note    Patient Name: Gaby Stanley   MRN: [id number]  Admission Date: 2/16/2024   Hospital Length of Stay: 1 days  Code Status: No Order    Attending Physician: Rosalinda Sanders MD  Principal Problem: CAP (community acquired pneumonia)    Inpatient consult to Pulmonology  Consult performed by: Rachel Soto, NP  Consult ordered by: Rosalinda Sanders MD        Subjective:   History of Present Illness:  Gaby Stanley is a 73-year-old female with a past medical history of chronic headaches, depression, diverticulitis, GERD, hyperlipidemia, hypertension, recurrent UTI, and rheumatoid arthritis. Patient reports a history of chronic cough which is mostly dry in nature. Recent history of recurrent upper respiratory infections which has been ongoing since September 2023. Reports a prior history of COVID in June 2022. She lives in [facility]. She denies any indoor pets. She denies any other home or work exposures. She reports taking MacroBid for approximately one year for recurrent UTIs. She endorses chronic reflux which is well controlled on Protonix. She reports a remote smoking history. She states she began smoking around the age of 15 and quit smoking over 40 years ago. She reports smoking less than a pack per day when she was smoking and reports she intermittently quit smoking during pregnancies. She reports rheumatoid arthritis of the hands bilaterally but not on chronic medications. She endorses chronic headaches and chronic sinus congestion. Denies any known allergies or history of asthma or prior lung disease. Patient presented to the emergency room on 2/17 with worsening shortness of breath and cough with associated subjective fever, body aches, and chills. Patient admitted by hospital medicine for the treatment of community acquired pneumonia. CT chest demonstrates chronic interstitial lung changes with ground  glass changes right greater than left. Pulmonary has been consulted for evaluation.     Past Medical History:   Diagnosis Date    Chronic headaches     Depression     Diverticulitis     GERD (gastroesophageal reflux disease)     Hypercholesteremia     Hypertension     Migraine headache      Past Surgical History:   Procedure Laterality Date    ABDOMINAL SURGERY      APPENDECTOMY      CATARACT EXTRACTION      EYE SURGERY      HYSTERECTOMY      TONSILLECTOMY       Review of patient's allergies indicates:   Allergen Reactions    Amitriptyline     Amoxicillin-pot clavulanate Other (See Comments)    Sulfa (sulfonamide antibiotics)      Family History    None       Tobacco Use    Smoking status: Former    Smokeless tobacco: Never   Substance and Sexual Activity    Alcohol use: No    Drug use: No    Sexual activity: Not on file       Review of Systems: Negative except as indicated in HPI  Objective:     Vital Signs (Most Recent):  Temp: 97.9 °F (36.6 °C) (02/18/24 0746)  Pulse: 68 (02/18/24 0800)  Resp: 18 (02/18/24 0746)  BP: 109/61 (02/18/24 0746)  SpO2: (!) 94 % (02/18/24 0800) Vital Signs (24h Range):  Temp:  [97.7 °F (36.5 °C)-98.6 °F (37 °C)] 97.9 °F (36.6 °C)  Pulse:  [66-91] 68  Resp:  [18-20] 18  SpO2:  [91 %-94 %] 94 %  BP: (107-132)/(51-69) 109/61   Weight: 63.8 kg (140 lb 10.5 oz);  Body mass index is 23.41 kg/m².  No intake or output data in the 24 hours ending 02/18/24 0916     Physical Exam  Vitals and nursing note reviewed.   HENT:      Head: Normocephalic.   Eyes:      Conjunctiva/sclera: Conjunctivae normal.   Cardiovascular:      Rate and Rhythm: Normal rate.   Pulmonary:      Effort: Pulmonary effort is normal.      Breath sounds: Rales present.   Abdominal:      Palpations: Abdomen is soft.   Musculoskeletal:         General: Normal range of motion.      Cervical back: Normal range of motion and neck supple.   Skin:     General: Skin is warm and dry.   Neurological:      Mental Status: She is alert and  "oriented to person, place, and time.   Psychiatric:         Mood and Affect: Mood normal.      Significant Labs:  CBC/Anemia Profile:  Recent Labs   Lab 02/16/24  2143 02/18/24  0434   WBC 11.08 8.13   HGB 10.9* 10.0*   HCT 33.2* 29.8*    241   MCV 87 86   RDW 13.6 13.6   Chemistries:  Recent Labs   Lab 02/16/24  2143 02/18/24  0433    141   K 3.5 3.7    106   CO2 21* 24   BUN 15 11   CREATININE 0.7 0.7   CALCIUM 9.0 9.6   ALBUMIN 2.9*  --    PROT 7.0  --    BILITOT 0.3  --    ALKPHOS 130  --    ALT 10  --    AST 24  --    Significant Imaging:   CT: I have reviewed all pertinent results/findings within the past 24 hours and my personal findings are:  chronic interstitial changes noted with ground glass opacities right greater than left    ABG  Recent Labs   Lab 02/16/24  2203   PH 7.456*   PO2 56*   PCO2 35.3   HCO3 24.9   BE 1     Assessment/Plan:   Abnormal CT of the chest  CAP (community acquired pneumonia)  Cough  Patient with remote smoking history who quit over 40 years ago[place], on chronic Macro-Bid, GERD and reported history of rheumatoid arthritis not on chronic meds. She endorses chronic headaches and chronic sinus congestion. Denies any history of allergies or known history of asthma or lung disease. She has had recurrent reported "upper respiratory infections" treated with Levaquin and prednisone. She reports her cough did stop after completion of her antibiotics and steroids.     Add Prednisone taper  Check fungal immuno studies  Check fungitell  Check sputum culture with fungal sputum culture  Check KYE, ANCA, rheumatoid factor  Check echo  Follow imaging and ABGs as needed  Needs outpatient pulmonary follow-up upon discharge  Referral sent to clinic and message sent to office to schedule appointment     GERD (gastroesophageal reflux disease)  Continue Protonix    Thank you for your consult. Pulmonary service will follow-up with patient. Please contact us if you " have any additional questions.     Rachel Soto NP  Pulmonary and Critical Care  [facility]

## 2024-02-18 NOTE — ASSESSMENT & PLAN NOTE
Patient with current diagnosis of pneumonia due to unspecified organism which is controlled. I have reviewed the pertinent imaging. Current antimicrobial regimen consists of   Antibiotics (From admission, onward)    Start     Stop Route Frequency Ordered    02/17/24 2300  cefTRIAXone (Rocephin) 1 g in dextrose 5 % in water (D5W) 100 mL IVPB (MB+)         -- IV Every 24 hours (non-standard times) 02/17/24 0648    02/17/24 2300  azithromycin (ZITHROMAX) 500 mg in dextrose 5 % (D5W) 250 mL IVPB (Vial-Mate)         -- IV Every 24 hours (non-standard times) 02/17/24 0648      . Cultures drawn and noted-   Microbiology Results (last 7 days)     Procedure Component Value Units Date/Time    Culture, Respiratory with Gram Stain [[id number]]     Order Status: No result Specimen: Respiratory from Endotracheal Aspirate     Fungus culture [[id number]]     Order Status: No result Specimen: Sputum     Blood Culture #1 **CANNOT BE ORDERED STAT** [[id number]] Collected: 02/16/24 2143    Order Status: Completed Specimen: Blood from Peripheral, Antecubital, Right Updated: 02/18/24 0613     Blood Culture, Routine No Growth to date      No Growth to date    Blood Culture #2 **CANNOT BE ORDERED STAT** [[id number]] Collected: 02/16/24 2144    Order Status: Completed Specimen: Blood from Peripheral, Antecubital, Left Updated: 02/18/24 0613     Blood Culture, Routine No Growth to date      No Growth to date       Will monitor patient closely and continue current treatment plan unchanged.

## 2024-02-19 VITALS
DIASTOLIC BLOOD PRESSURE: 57 MMHG | RESPIRATION RATE: 18 BRPM | OXYGEN SATURATION: 93 % | HEART RATE: 75 BPM | HEIGHT: 65 IN | WEIGHT: 140 LBS | SYSTOLIC BLOOD PRESSURE: 108 MMHG | TEMPERATURE: 98 F | BODY MASS INDEX: 23.32 KG/M2

## 2024-02-19 LAB
ANA PATTERN 1: NORMAL
ANA SER QL IF: POSITIVE
ANA TITR SER IF: NORMAL {TITER}

## 2024-02-19 PROCEDURE — 25000003 PHARM REV CODE 250: Performed by: NURSE PRACTITIONER

## 2024-02-19 PROCEDURE — 25000003 PHARM REV CODE 250: Performed by: INTERNAL MEDICINE

## 2024-02-19 PROCEDURE — 63600175 PHARM REV CODE 636 W HCPCS: Performed by: NURSE PRACTITIONER

## 2024-02-19 PROCEDURE — 63600175 PHARM REV CODE 636 W HCPCS: Performed by: INTERNAL MEDICINE

## 2024-02-19 RX ORDER — BENZONATATE 100 MG/1
100 CAPSULE ORAL 3 TIMES DAILY PRN
Qty: 30 CAPSULE | Refills: 0 | Status: SHIPPED | OUTPATIENT
Start: 2024-02-19 | End: 2024-03-07 | Stop reason: SDUPTHER

## 2024-02-19 RX ORDER — PREDNISONE 10 MG/1
TABLET ORAL
Qty: 50 TABLET | Refills: 0 | Status: SHIPPED | OUTPATIENT
Start: 2024-02-19 | End: 2024-03-10

## 2024-02-19 RX ORDER — LEVOFLOXACIN 750 MG/1
750 TABLET ORAL DAILY
Qty: 7 TABLET | Refills: 0 | Status: SHIPPED | OUTPATIENT
Start: 2024-02-19

## 2024-02-19 RX ORDER — ONDANSETRON 4 MG/1
4 TABLET, ORALLY DISINTEGRATING ORAL EVERY 6 HOURS PRN
Qty: 20 TABLET | Refills: 0 | Status: SHIPPED | OUTPATIENT
Start: 2024-02-19 | End: 2024-03-12 | Stop reason: SDUPTHER

## 2024-02-19 RX ADMIN — PANTOPRAZOLE SODIUM 40 MG: 40 TABLET, DELAYED RELEASE ORAL at 08:02

## 2024-02-19 RX ADMIN — BUTALBITAL, ACETAMINOPHEN, AND CAFFEINE 1 TABLET: 325; 50; 40 TABLET ORAL at 12:02

## 2024-02-19 RX ADMIN — BUTALBITAL, ACETAMINOPHEN, AND CAFFEINE 1 TABLET: 325; 50; 40 TABLET ORAL at 10:02

## 2024-02-19 RX ADMIN — SERTRALINE HYDROCHLORIDE 100 MG: 50 TABLET ORAL at 08:02

## 2024-02-19 RX ADMIN — FLUTICASONE PROPIONATE 100 MCG: 50 SPRAY, METERED NASAL at 08:02

## 2024-02-19 RX ADMIN — ONDANSETRON 4 MG: 2 INJECTION INTRAMUSCULAR; INTRAVENOUS at 08:02

## 2024-02-19 RX ADMIN — CEFTRIAXONE 1 G: 1 INJECTION, POWDER, FOR SOLUTION INTRAMUSCULAR; INTRAVENOUS at 01:02

## 2024-02-19 RX ADMIN — AMLODIPINE BESYLATE 10 MG: 10 TABLET ORAL at 08:02

## 2024-02-19 RX ADMIN — CETIRIZINE HYDROCHLORIDE 10 MG: 10 TABLET, FILM COATED ORAL at 08:02

## 2024-02-19 RX ADMIN — BENZONATATE 100 MG: 100 CAPSULE ORAL at 08:02

## 2024-02-19 RX ADMIN — PREDNISONE 40 MG: 20 TABLET ORAL at 08:02

## 2024-02-19 NOTE — PLAN OF CARE
[facility] - [facility]  Discharge Final Note    Primary Care Provider: Aaron Narayan MD    Expected Discharge Date: 2/19/2024    Final Discharge Note (most recent)       Final Note - 02/19/24 1304          Final Note    Assessment Type Final Discharge Note     Anticipated Discharge Disposition Home or Self Care     Hospital Resources/Appts/Education Provided Post-Acute resouces added to AVS;Appointments scheduled and added to AVS        Post-Acute Status    Post-Acute Authorization HME     E Status Set-up Complete/Auth obtained                     Important Message from Medicare             Contact Info       Aaron Narayan MD   Specialty: Internal Medicine, Family Medicine   Relationship: PCP - General    [address] [place] [address]   Phone: [phone]       Next Steps: Schedule an appointment as soon as possible for a visit in 2 week(s)    Instructions: Hospital discharge follow up          DC dispo: home  PCP f/u: Feb 22nd  DME: home O2 approved per [facility], E-tank set up plus one additional portable tank delivered to bedside.

## 2024-02-19 NOTE — PROGRESS NOTES
[facility]  Pulmonology Progress Note    Patient Name: Gaby Stanley  MRN: [id number]  Admission Date: 2/16/2024  Hospital Length of Stay: 2 days  Code Status: Full Code  Attending Provider: Mekhi Winter MD  Primary Care Provider: Aaron Narayan MD   Principal Problem: CAP (community acquired pneumonia)    Subjective:     Gaby Stanley is a 73-year-old female with a past medical history of chronic headaches, depression, diverticulitis, GERD, hyperlipidemia, hypertension, recurrent UTI, and rheumatoid arthritis. Patient reports a history of chronic cough which is mostly dry in nature. Recent history of recurrent upper respiratory infections which has been ongoing since September 2023. Reports a prior history of COVID in June 2022. She lives in [facility]. She denies any indoor pets. She denies any other home or work exposures. She reports taking MacroBid for approximately one year for recurrent UTIs. She endorses chronic reflux which is well controlled on Protonix. She reports a remote smoking history. She states she began smoking around the age of 15 and quit smoking over 40 years ago. She reports smoking less than a pack per day when she was smoking and reports she intermittently quit smoking during pregnancies. She reports rheumatoid arthritis of the hands bilaterally but not on chronic medications. Patient presented to the emergency room on 2/17 with worsening shortness of breath and cough with associated subjective fever, body aches, and chills. Patient admitted by hospital medicine for the treatment of community acquired pneumonia. CT chest demonstrates chronic interstitial lung changes with ground glass changes right greater than left. Pulmonary has been consulted for evaluation.     Interval history:  2/19: pulmonary status stable on 1L/NC. Persistent dry cough; no acute events overnight.   Long  Objective:     Vital Signs (Most Recent):  Temp:  "98.6 °F (37 °C) (02/18/24 2100)  Pulse: 79 (02/19/24 0826)  Resp: 18 (02/18/24 2100)  BP: (!) 129/58 (02/18/24 2100)  SpO2: (!) 93 % (02/18/24 2100) Vital Signs (24h Range):  Temp:  [97.2 °F (36.2 °C)-98.6 °F (37 °C)] 98.6 °F (37 °C)  Pulse:  [57-87] 79  Resp:  [12-20] 18  SpO2:  [92 %-95 %] 93 %  BP: (108-129)/(56-61) 129/58   Weight: 63.5 kg (140 lb);  Body mass index is 23.3 kg/m².    Intake/Output Summary (Last 24 hours) at 2/19/2024 0920  Last data filed at 2/18/2024 1341  Gross per 24 hour   Intake --   Output 1000 ml   Net -1000 ml        Physical Exam  Vitals and nursing note reviewed.   HENT:      Head: Normocephalic.   Eyes:      Conjunctiva/sclera: Conjunctivae normal.   Cardiovascular:      Rate and Rhythm: Normal rate.   Pulmonary:      Effort: Pulmonary effort is normal.      Breath sounds: Normal breath sounds.   Abdominal:      Palpations: Abdomen is soft.   Musculoskeletal:         General: Normal range of motion.      Cervical back: Normal range of motion.   Skin:     General: Skin is warm and dry.   Neurological:      Mental Status: She is alert and oriented to person, place, and time.   Psychiatric:         Mood and Affect: Mood normal.         Review of Systems: Negative except as indicated in HPI    Significant Labs:  CBC/Anemia Profile:  Recent Labs   Lab 02/18/24  0434   WBC 8.13   HGB 10.0*   HCT 29.8*      MCV 86   RDW 13.6   Chemistries:  Recent Labs   Lab 02/18/24  0433      K 3.7      CO2 24   BUN 11   CREATININE 0.7   CALCIUM 9.6     ABG  Recent Labs   Lab 02/16/24  2203   PH 7.456*   PO2 56*   PCO2 35.3   HCO3 24.9   BE 1     Assessment/Plan:   Abnormal CT of the chest  CAP (community acquired pneumonia)  Cough  Patient with remote smoking history who quit over 40 years ago[place], on chronic Macro-Bid, GERD and reported history of rheumatoid arthritis not on chronic meds. She has had recurrent reported "upper respiratory infections" treated with " Levaquin and prednisone. She reports her cough did stop after completion of her antibiotics and steroids.     Continue Prednisone taper as ordered  Fungal immuno studies and fungitell pending  Sputum culture with fungal sputum culture if able to obtain  KYE and ANCA pending  Rheumatoid factor negative  Follow imaging and ABGs as needed  Outpatient pulmonary follow-up upon discharge  Referral sent to clinic and message sent to office to schedule appointment     GERD (gastroesophageal reflux disease)  Continue Protonix    Rachel Soto NP  Pulmonary and Critical Care  [facility]

## 2024-02-19 NOTE — DISCHARGE SUMMARY
[facility]  Hospital Medicine  Discharge Summary      Patient Name: Gaby Stanley  MRN: [id number]  [facility]: [id number]  Patient Class: IP- Inpatient  Admission Date: 2/16/2024  Hospital Length of Stay: 2 days  Discharge Date and Time:  02/19/2024 11:09 AM  Attending Physician: Mekhi Winter MD   Discharging Provider: Mekhi Winter MD  Primary Care Provider: Aaron Narayan MD    Primary Care Team: Networked reference to record PCT     HPI:   Gaby Stanley is a 73 y.o. female with a PMH  has a past medical history of Chronic headaches, Depression, Diverticulitis, GERD (gastroesophageal reflux disease), Hypercholesteremia, Hypertension, and Migraine headache. Presents as a transfer from our [place] for further evaluation/treatment for CAP.  Patient initially presented to that facility for worsening nonproductive cough which started last week with associated generalized weakness/fatigue.  Patient states that yesterday she began experiencing fever, chills, sweats, and nausea.  Denies any relief with over-the-counter Robitussin and Mucinex DM.  Patient reports relief with home Phenergan for her nausea.  Denies any actual chest pain, headache, lightheadedness, dizziness, abdominal pain common bladder/bowel complaints, or any other symptoms at this time.    ER workup revealed D-dimer of 1.4, CBG of 149 mg/dL, H/H of 10.9/33.2, flu/COVID negative, UA negative, BNP and troponin negative, lactic acid negative, ABG revealed pH of 7.456, pCO2 of 35.3, PO2 of 56, and HC03 of 24.9.  Chest x-ray revealed:[Bilateral mixed interstitial and airspace pulmonary opacit].  EKG revealed sinus tachycardia with a ventricular rate of 103 beats per minute with a QT/QTC of 370/44.  Patient received 1 g of Rocephin and 500 mg of azithromycin at outside facility.  Patient also received 600 mg of ibuprofen, 1.5 L of lactated Ringer's, 4 mg of Zofran.  Hospital Medicine consulted to admit patient for CAP.  Patient in agreement  with treatment plan.  Patient admitted under observation status.      PCP: Aaron Narayan      * No surgery found *      Hospital Course:   Continued on empiric treatment for CAP with Azithro + Rocephin. Although low suspicion for PE, given elevated d-dimer, hypoxia and mild tachycardia on admission, will obtain CTA Chest to r/o PE. CTA chest was negative for PE but showed diffuse GGO in R lung with underlying interstitial lung disease. Pulmonology consulted for evaluation.      Goals of Care Treatment Preferences:  Code Status: Full Code      Consults:   Consults (From admission, onward)          Status Ordering Provider     Inpatient consult to Social Work  Once        Provider:  (Not yet assigned)    Acknowledged JOVANNI CARRILLO     Inpatient consult to Pulmonology  Once        Provider:  Zaid Foreman MD    Completed SRIKANTH BAEZA            No new Assessment & Plan notes have been filed under this hospital service since the last note was generated.  Service: Hospital Medicine    Final Active Diagnoses:    Diagnosis Date Noted POA    PRINCIPAL PROBLEM:  CAP (community acquired pneumonia) [J18.9] 02/17/2024 Yes    Abnormal CT of the chest [R93.89] 02/18/2024 Yes    GERD (gastroesophageal reflux disease) [K21.9] 02/17/2024 Yes    Cough [R05.9] 02/17/2024 Yes    Hypertension [I10] 01/16/2015 Yes    Hyperlipidemia LDL goal <130 [E78.5] 09/23/2013 Yes    Anxiety [F41.9] 07/22/2013 Yes      Problems Resolved During this Admission:       Discharged Condition: stable    Disposition: Home or Self Care    Follow Up:   Follow-up Information       Aaron Narayan MD. Schedule an appointment as soon as possible for a visit in 2 week(s).    Specialties: Internal Medicine, Family Medicine  Why: Hospital discharge follow up  Contact information:  [address] [address] [id number] [phone]                           Patient Instructions:      OXYGEN FOR HOME USE     Order Specific Question Answer Comments  "  Liter Flow 2    Duration With activity    Qualifying Test Performed at: Activity    Oxygen saturation at rest 91    Oxygen saturation with activity 84    Oxygen saturation with activity on oxygen 91    Portable mode: continuous    Route nasal cannula    Device: home concentrator with portable tanks    Length of need (in months): 3 mos    Patient condition with qualifying saturation Other - List qualifying diagnosis and code Pnuemonia   Select a diagnosis & list the code in the comments Pneumonia [013001]    Height: 5' 5" (1.651 m)    Weight: 63.5 kg (140 lb)    Alternative treatment measures have been tried or considered and deemed clinically ineffective. Yes      Ambulatory referral/consult to Pulmonology   Standing Status: Future   Referral Priority: Routine Referral Type: Consultation   Referral Reason: Specialty Services Required   Referred to Provider: APRIL RAMIREZ Requested Specialty: Pulmonary Disease   Number of Visits Requested: 1       Significant Diagnostic Studies: Labs: All labs within the past 24 hours have been reviewed    Pending Diagnostic Studies:       Procedure Component Value Units Date/Time    KYE [[id number]] Collected: 02/18/24 0915    Order Status: Sent Lab Status: In process Updated: 02/18/24 1404    Specimen: Blood     Anti-neutrophilic cytoplasmic antibody [[id number]] Collected: 02/18/24 0915    Order Status: Sent Lab Status: In process Updated: 02/18/24 1404    Specimen: Blood     Anti-neutrophilic cytoplasmic antibody [[id number]] Collected: 02/18/24 0915    Order Status: Sent Lab Status: In process Updated: 02/18/24 1404    Specimen: Blood     Fungal Immunodiffusion - Blood [[id number]] Collected: 02/18/24 0914    Order Status: Sent Lab Status: In process Updated: 02/18/24 1404    Specimen: Blood     Fungitell Assay For (1.3)-B-D-Glucans [[id number]] Collected: 02/18/24 0915    Order Status: Sent Lab Status: In process Updated: 02/18/24 1404    Specimen: Blood          "   Medications:  Reconciled Home Medications:      Medication List        START taking these medications      benzonatate 100 MG capsule  Commonly known as: TESSALON  Take 1 capsule (100 mg total) by mouth 3 (three) times daily as needed for Cough.     levoFLOXacin 750 MG tablet  Commonly known as: LEVAQUIN  Take 1 tablet (750 mg total) by mouth once daily.     ondansetron 4 MG Tbdl  Commonly known as: ZOFRAN-ODT  Take 1 tablet (4 mg total) by mouth every 6 (six) hours as needed (Nausea).     predniSONE 10 MG tablet  Commonly known as: DELTASONE  Take 4 tablets (40 mg total) by mouth once daily for 5 days, THEN 3 tablets (30 mg total) once daily for 5 days, THEN 2 tablets (20 mg total) once daily for 5 days, THEN 1 tablet (10 mg total) once daily for 5 days.  Start taking on: February 19, 2024            CONTINUE taking these medications      alendronate 70 MG tablet  Commonly known as: FOSAMAX  Take 70 mg by mouth every 7 days.     amLODIPine 10 MG tablet  Commonly known as: NORVASC  Take 1 tablet by mouth every morning.     butalbital-acetaminophen-caffeine -40 mg -40 mg per tablet  Commonly known as: FIORICET, ESGIC  Take 1 tablet by mouth 3 (three) times daily as needed.     CRESTOR ORAL  Take by mouth.     ICAR-C PLUS Tab  Generic drug: iron-vit c-b12-folic acid  Take 1 tablet by mouth every morning.     IMITREX ORAL  Take by mouth as needed.     loratadine 10 mg tablet  Commonly known as: CLARITIN  Take 10 mg by mouth once daily at 6am.     pantoprazole 40 MG tablet  Commonly known as: PROTONIX  Take 40 mg by mouth 2 (two) times daily.     promethazine 25 MG tablet  Commonly known as: PHENERGAN  Take 0.5 tablets (12.5 mg total) by mouth every 6 (six) hours as needed for Nausea.     sertraline 100 MG tablet  Commonly known as: ZOLOFT  Take 100 mg by mouth once daily.     traZODone 100 MG tablet  Commonly known as: DESYREL  Take 2 tablets by mouth every evening.     ZENPEP 25,000-85,000- 136,000 unit  Cpdr  Generic drug: lipase-protease-amylase  Take by mouth before meals and at bedtime as needed.              Indwelling Lines/Drains at time of discharge:   Lines/Drains/Airways       None                   Time spent on the discharge of patient: 45 minutes         Mekhi Winter MD  Department of Hospital Medicine  [facility]

## 2024-02-19 NOTE — SUBJECTIVE & OBJECTIVE
Gaby Stanley is a 73-year-old female with a past medical history of chronic headaches, depression, diverticulitis, GERD, hyperlipidemia, hypertension, recurrent UTI, and rheumatoid arthritis. Patient reports a history of chronic cough which is mostly dry in nature. Recent history of recurrent upper respiratory infections which has been ongoing since September 2023. Reports a prior history of COVID in June 2022. [place] She denies any indoor pets. She denies any other home or work exposures. She reports taking MacroBid for approximately one year for recurrent UTIs. She endorses chronic reflux which is well controlled on Protonix. She reports a remote smoking history. She states she began smoking around the age of 15 and quit smoking over 40 years ago. She reports smoking less than a pack per day when she was smoking and reports she intermittently quit smoking during pregnancies. She reports rheumatoid arthritis of the hands bilaterally but not on chronic medications. Patient presented to the emergency room on 2/17 with worsening shortness of breath and cough with associated subjective fever, body aches, and chills. Patient admitted by hospital medicine for the treatment of community acquired pneumonia. CT chest demonstrates chronic interstitial lung changes with ground glass changes right greater than left. Pulmonary has been consulted for evaluation.     Interval history:  2/19: pulmonary status stable on 1L/NC. Persistent dry cough; no acute events overnight.   Long  Objective:     Vital Signs (Most Recent):  Temp: 98.6 °F (37 °C) (02/18/24 2100)  Pulse: 79 (02/19/24 0826)  Resp: 18 (02/18/24 2100)  BP: (!) 129/58 (02/18/24 2100)  SpO2: (!) 93 % (02/18/24 2100) Vital Signs (24h Range):  Temp:  [97.2 °F (36.2 °C)-98.6 °F (37 °C)] 98.6 °F (37 °C)  Pulse:  [57-87] 79  Resp:  [12-20] 18  SpO2:  [92 %-95 %] 93 %  BP: (108-129)/(56-61) 129/58   Weight: 63.5 kg (140 lb);  Body mass  index is 23.3 kg/m².    Intake/Output Summary (Last 24 hours) at 2/19/2024 0920  Last data filed at 2/18/2024 1341  Gross per 24 hour   Intake --   Output 1000 ml   Net -1000 ml        Physical Exam  Vitals and nursing note reviewed.   HENT:      Head: Normocephalic.   Eyes:      Conjunctiva/sclera: Conjunctivae normal.   Cardiovascular:      Rate and Rhythm: Normal rate.   Pulmonary:      Effort: Pulmonary effort is normal.      Breath sounds: Normal breath sounds.   Abdominal:      Palpations: Abdomen is soft.   Musculoskeletal:         General: Normal range of motion.      Cervical back: Normal range of motion.   Skin:     General: Skin is warm and dry.   Neurological:      Mental Status: She is alert and oriented to person, place, and time.   Psychiatric:         Mood and Affect: Mood normal.         Review of Systems: Negative except as indicated in HPI    Significant Labs:  CBC/Anemia Profile:  Recent Labs   Lab 02/18/24  0434   WBC 8.13   HGB 10.0*   HCT 29.8*      MCV 86   RDW 13.6   Chemistries:  Recent Labs   Lab 02/18/24  0433      K 3.7      CO2 24   BUN 11   CREATININE 0.7   CALCIUM 9.6

## 2024-02-20 LAB
1,3 BETA GLUCAN SER-MCNC: 294 PG/ML
FUNGITELL COMMENTS: POSITIVE

## 2024-02-21 LAB
ANCA AB TITR SER IF: NORMAL TITER
ANCA AB TITR SER IF: NORMAL TITER
ANTI SM ANTIBODY: 0.08 RATIO (ref 0–0.99)
ANTI SM/RNP ANTIBODY: 0.08 RATIO (ref 0–0.99)
ANTI-SM INTERPRETATION: NEGATIVE
ANTI-SM/RNP INTERPRETATION: NEGATIVE
ANTI-SSA ANTIBODY: 0.05 RATIO (ref 0–0.99)
ANTI-SSA INTERPRETATION: NEGATIVE
ANTI-SSB ANTIBODY: 0.09 RATIO (ref 0–0.99)
ANTI-SSB INTERPRETATION: NEGATIVE
DSDNA AB SER-ACNC: NORMAL [IU]/ML
P-ANCA TITR SER IF: NORMAL TITER
P-ANCA TITR SER IF: NORMAL TITER

## 2024-02-22 LAB
BACTERIA BLD CULT: NORMAL
BACTERIA BLD CULT: NORMAL

## 2024-02-23 LAB
ASPERGILLUS AB SER QL ID: NOT DETECTED
B DERMAT AB SER QL ID: NOT DETECTED
C IMMITIS AB SER QL ID: NOT DETECTED
H CAPSUL AB SER QL ID: NOT DETECTED

## 2024-03-07 ENCOUNTER — LAB VISIT (OUTPATIENT)
Dept: LAB | Facility: HOSPITAL | Age: 74
End: 2024-03-07
Attending: HOSPITALIST
Payer: MEDICARE

## 2024-03-07 ENCOUNTER — CLINICAL SUPPORT (OUTPATIENT)
Dept: PULMONOLOGY | Facility: CLINIC | Age: 74
End: 2024-03-07
Payer: MEDICARE

## 2024-03-07 ENCOUNTER — OFFICE VISIT (OUTPATIENT)
Dept: PULMONOLOGY | Facility: CLINIC | Age: 74
End: 2024-03-07
Payer: MEDICARE

## 2024-03-07 VITALS
WEIGHT: 140.44 LBS | RESPIRATION RATE: 18 BRPM | OXYGEN SATURATION: 92 % | BODY MASS INDEX: 23.4 KG/M2 | HEART RATE: 88 BPM | HEIGHT: 65 IN | SYSTOLIC BLOOD PRESSURE: 110 MMHG | DIASTOLIC BLOOD PRESSURE: 70 MMHG

## 2024-03-07 VITALS — HEIGHT: 65 IN | WEIGHT: 140.44 LBS | BODY MASS INDEX: 23.4 KG/M2

## 2024-03-07 DIAGNOSIS — J84.9 INTERSTITIAL PULMONARY DISEASE, UNSPECIFIED: ICD-10-CM

## 2024-03-07 DIAGNOSIS — J84.9 INTERSTITIAL PULMONARY DISEASE, UNSPECIFIED: Primary | ICD-10-CM

## 2024-03-07 DIAGNOSIS — J84.9 INTERSTITIAL LUNG DISEASE: ICD-10-CM

## 2024-03-07 LAB
CRP SERPL-MCNC: 18.1 MG/L (ref 0–8.2)
ERYTHROCYTE [SEDIMENTATION RATE] IN BLOOD BY PHOTOMETRIC METHOD: 12 MM/HR (ref 0–36)

## 2024-03-07 PROCEDURE — 1159F MED LIST DOCD IN RCRD: CPT | Mod: CPTII,S$GLB,, | Performed by: HOSPITALIST

## 2024-03-07 PROCEDURE — 3008F BODY MASS INDEX DOCD: CPT | Mod: CPTII,S$GLB,, | Performed by: HOSPITALIST

## 2024-03-07 PROCEDURE — 1160F RVW MEDS BY RX/DR IN RCRD: CPT | Mod: CPTII,S$GLB,, | Performed by: HOSPITALIST

## 2024-03-07 PROCEDURE — 99999 PR PBB SHADOW E&M-EST. PATIENT-LVL V: CPT | Mod: PBBFAC,,, | Performed by: HOSPITALIST

## 2024-03-07 PROCEDURE — 1101F PT FALLS ASSESS-DOCD LE1/YR: CPT | Mod: CPTII,S$GLB,, | Performed by: HOSPITALIST

## 2024-03-07 PROCEDURE — 94618 PULMONARY STRESS TESTING: CPT | Mod: S$GLB,,, | Performed by: INTERNAL MEDICINE

## 2024-03-07 PROCEDURE — 3078F DIAST BP <80 MM HG: CPT | Mod: CPTII,S$GLB,, | Performed by: HOSPITALIST

## 2024-03-07 PROCEDURE — 85652 RBC SED RATE AUTOMATED: CPT | Performed by: HOSPITALIST

## 2024-03-07 PROCEDURE — 99205 OFFICE O/P NEW HI 60 MIN: CPT | Mod: S$GLB,,, | Performed by: HOSPITALIST

## 2024-03-07 PROCEDURE — 3288F FALL RISK ASSESSMENT DOCD: CPT | Mod: CPTII,S$GLB,, | Performed by: HOSPITALIST

## 2024-03-07 PROCEDURE — 87449 NOS EACH ORGANISM AG IA: CPT | Performed by: HOSPITALIST

## 2024-03-07 PROCEDURE — 86140 C-REACTIVE PROTEIN: CPT | Performed by: HOSPITALIST

## 2024-03-07 PROCEDURE — 36415 COLL VENOUS BLD VENIPUNCTURE: CPT | Performed by: HOSPITALIST

## 2024-03-07 PROCEDURE — 99999 PR PBB SHADOW E&M-EST. PATIENT-LVL I: CPT | Mod: PBBFAC,,,

## 2024-03-07 PROCEDURE — 3074F SYST BP LT 130 MM HG: CPT | Mod: CPTII,S$GLB,, | Performed by: HOSPITALIST

## 2024-03-07 PROCEDURE — 1111F DSCHRG MED/CURRENT MED MERGE: CPT | Mod: CPTII,S$GLB,, | Performed by: HOSPITALIST

## 2024-03-07 RX ORDER — BENZONATATE 100 MG/1
200 CAPSULE ORAL 3 TIMES DAILY PRN
Qty: 30 CAPSULE | Refills: 0 | Status: SHIPPED | OUTPATIENT
Start: 2024-03-07 | End: 2024-03-17

## 2024-03-07 RX ORDER — PREDNISONE 10 MG/1
10 TABLET ORAL DAILY
Qty: 14 TABLET | Refills: 0 | Status: SHIPPED | OUTPATIENT
Start: 2024-03-07

## 2024-03-07 RX ORDER — GUAIFENESIN 100 MG/5ML
200 SOLUTION ORAL 3 TIMES DAILY PRN
Qty: 180 ML | Refills: 3 | Status: SHIPPED | OUTPATIENT
Start: 2024-03-07 | End: 2024-03-17

## 2024-03-07 NOTE — PROCEDURES
"[facility]  Six Minute Walk     SUMMARY     Ordering Provider: Valerie Knowles PA-C   Interpreting Provider: Jordan Bender MD  Performing nurse/tech/RT: VT, RT  Diagnosis:  (Interstitial pulmonary disease, unspecified)  Height: 5' 5" (165.1 cm)  Weight: 63.7 kg (140 lb 6.9 oz)  BMI (Calculated): 23.4   Patient Race:             Phase Oxygen Assessment Supplemental O2 Heart   Rate Blood Pressure Yan Dyspnea Scale Rating   Resting 93 % Room Air 73 bpm 115/57 0   Exercise        Minute        1 98 % Room Air 91 bpm     2 95 % Room Air 103 bpm     3 94 % Room Air 103 bpm     4 94 % Room Air 1000 bpm     5 94 % Room Air 95 bpm     6  94 % Room Air 96 bpm 112/59 3   Recovery        Minute        1 100 % Room Air 74 bpm     2 97 % Room Air 78 bpm     3 97 % Room Air 72 bpm     4 98 % Room Air 72 bpm 115/54 2     Six Minute Walk Summary  6MWT Status: completed without stopping  Patient Reported: Dyspnea     Interpretation:  Did the patient stop or pause?: No                                         Total Time Walked (Calculated): 360 seconds  Final Partial Lap Distance (feet): 125 feet  Total Distance Meters (Calculated): 281.94 meters  Predicted Distance Meters (Calculated): 447.55 meters  Percentage of Predicted (Calculated): 63  Peak VO2 (Calculated): 12.44  Mets: 3.55  Has The Patient Had a Previous Six Minute Walk Test?: No       Previous 6MWT Results  Has The Patient Had a Previous Six Minute Walk Test?: No      Interpretation:  Total distance walked in six minutes is mildly reduced indicating a reduction in overall  functional capacity. The patient did not meet criteria for supplemental oxygen prescription.  Clinical correlation suggested.  [] Mild exercise-induced hypoxemia described as an arterial oxygen saturation of 93-95% (with a fall of 3-4% with exercise),   [] Moderate exercise-induced hypoxemia as a fall in oxygen saturation to  89-93% (with a fall of 3-4 % with " exercise)  [] Severe exercise induced hypoxemia as < 89% O2 saturation (88% and below).  Medicare Criteria for Oxygen prescription comments: When arterial oxygen saturation is at or below 88% during exercise (severe exercise induced hypoxemia) then the patient falls under   Details about Medicare Group Criteria coverage can be found at http://www.cms.[facility].gov/manuals/downloads/     Jordan Bender MD

## 2024-03-07 NOTE — ASSESSMENT & PLAN NOTE
Last CT was in 2015- abd/pelvis- no interstitial disease    Differential includes:   Fungal- fungitell positive, though Ab for Cocci, aspergillus, blasto, histo negative- [place]  Autoimmune- KYE positive, does have hx arthralgias, AF/ANCA negative  Macrobid- has been on the past year, no CT chest to compare, last CXR 2020  Other    - repeating fungitell, extending prednisone, checking esr and crp  - 6MW performed today- did not require oxygen, instructed to continue use at night for now  - refilled tessalon and cough syrup  - stopping Macrobid- prescribed by Urologist, Dr. Ortega- pt to reach out for alternative (prophylactic for recurrent UTI)

## 2024-03-07 NOTE — PROGRESS NOTES
Subjective:      Patient ID: Gaby Stanley is a 73 y.o. female.    Chief Complaint: Interstitial lung disease, acute resp failure    73 year old female with history of depression, diverticulitis, GERD, HLD, HTN who is referred to Pulmonary clinic by Rachel Soto NP for hospital follow up recent admission for pneumonia.     Brief Admission Summary:   Pt presented to ED 2/16 with one week of progressive dry cough, weakness, and fatigue. CTA chest negative for PE, diffuse GGO throughout right lung, mild GGO left lung, subpleural reticulation and septal thickening. Flu/covid negative. Pulmonary was consulted and pt treated with Prednisone taper and CAP. She was discharged 2/19 on home oxygen, Levaqin, 20 day prednisone taper.     COVID June 2022- no hospitalization, no oxygen  Relapsing and remitting dry cough since at least September, possibly sooner  [place]  Quit smoking over 40 years ago, smoked 15 years    Hospital work up:  KYE positive- homogenous  Fungitell Positive (294)  Fungal immunodiffusion- negative (cocci, aspergillus, blasto, histo)  RF negative  ANCA negative    Today:  Overall doing much better than before, she does report being tired in the mornings, but that she has had difficulty sleeping since starting the Prednisone. She does continue to have a dry cough, but it is much better than before (was near constant). She has a pulse ox she keeps around her neck- has not seen her saturation below 90% over the past week. Has continued to wear oxygen at night, not sure when to wear during the day. Does have some sinus congestion as well. Her  provides additional history- she has been known to have a chronic cough for many years, going through many bottles of Robitussin. This has mostly been at night. She has one day left of prednisone.     Review of Systems   Constitutional:  Positive for fatigue.   Respiratory:  Positive for cough. Negative for sputum production, shortness of breath  "and wheezing.      Objective:     Physical Exam   Constitutional: She is oriented to person, place, and time. She appears well-developed and well-nourished.   Cardiovascular: Normal rate and regular rhythm.   Pulmonary/Chest:   Coarse breath sounds bilateral bases, R>L, normal effort at rest on room air   Musculoskeletal:         General: No edema.   Neurological: She is alert and oriented to person, place, and time.   Skin: Skin is warm and dry.   Psychiatric: She has a normal mood and affect.     Personal Diagnostic Review  As Above      2024    11:44 AM 2024     7:40 AM 2024     9:00 PM 2024     4:36 PM 2024    11:51 AM 2024     8:00 AM 2024     7:46 AM   Pulmonary Function Tests   SpO2 93 % 97 % 93 % 95 % 92 % 94 % 94 %   Height      5' 5" (1.651 m)    Weight      63.5 kg (140 lb)    BMI (Calculated)      23.3         Assessment:     1. Interstitial lung disease         Outpatient Encounter Medications as of 3/7/2024   Medication Sig Dispense Refill    alendronate (FOSAMAX) 70 MG tablet Take 70 mg by mouth every 7 days.      amLODIPine (NORVASC) 10 MG tablet Take 1 tablet by mouth every morning.      [] benzonatate (TESSALON) 100 MG capsule Take 1 capsule (100 mg total) by mouth 3 (three) times daily as needed for Cough. 30 capsule 0    [] butalbital-acetaminophen-caffeine -40 mg (FIORICET, ESGIC) -40 mg per tablet Take 1 tablet by mouth 3 (three) times daily as needed.      iron-vit c-b12-folic acid (ICAR-C PLUS) Tab Take 1 tablet by mouth every morning.      levoFLOXacin (LEVAQUIN) 750 MG tablet Take 1 tablet (750 mg total) by mouth once daily. 7 tablet 0    lipase-protease-amylase (ZENPEP) 25,000-85,000- 136,000 unit CpDR Take by mouth before meals and at bedtime as needed.      loratadine (CLARITIN) 10 mg tablet Take 10 mg by mouth once daily at 6am.      ondansetron (ZOFRAN-ODT) 4 MG TbDL Take 1 tablet (4 mg total) by mouth every 6 (six) hours as " needed (Nausea). 20 tablet 0    pantoprazole (PROTONIX) 40 MG tablet Take 40 mg by mouth 2 (two) times daily.       predniSONE (DELTASONE) 10 MG tablet Take 4 tablets (40 mg total) by mouth once daily for 5 days, THEN 3 tablets (30 mg total) once daily for 5 days, THEN 2 tablets (20 mg total) once daily for 5 days, THEN 1 tablet (10 mg total) once daily for 5 days. 50 tablet 0    promethazine (PHENERGAN) 25 MG tablet Take 0.5 tablets (12.5 mg total) by mouth every 6 (six) hours as needed for Nausea. 15 tablet 0    ROSUVASTATIN CALCIUM (CRESTOR ORAL) Take by mouth.      sertraline (ZOLOFT) 100 MG tablet Take 100 mg by mouth once daily.      SUMATRIPTAN SUCCINATE (IMITREX ORAL) Take by mouth as needed.       traZODone (DESYREL) 100 MG tablet Take 2 tablets by mouth every evening.       No facility-administered encounter medications on file as of 3/7/2024.     No orders of the defined types were placed in this encounter.          Plan:     Problem List Items Addressed This Visit          Pulmonary    Interstitial lung disease     Last CT was in 2015- abd/pelvis- no interstitial disease    Differential includes:   Fungal- fungitell positive, though Ab for Cocci, aspergillus, blasto, histo negative- [place]  Autoimmune- KYE positive, does have hx arthralgias, AF/ANCA negative  Macrobid- has been on the past year, no CT chest to compare, last CXR 2020  Other    - repeating fungitell, extending prednisone, checking esr and crp  - 6MW performed today- did not require oxygen, instructed to continue use at night for now  - refilled tessalon and cough syrup  - stopping Macrobid- prescribed by Urologist, Dr. Ortega- pt to reach out for alternative (prophylactic for recurrent UTI)         Relevant Medications    predniSONE (DELTASONE) 10 MG tablet    Other Relevant Orders    Fungitell Assay For (1.3)-B-D-Glucans     Other Visit Diagnoses       Interstitial pulmonary disease, unspecified    -  Primary    Relevant  Medications    benzonatate (TESSALON) 100 MG capsule    guaiFENesin 100 mg/5 ml (ROBITUSSIN) 100 mg/5 mL syrup    Other Relevant Orders    CT Chest Without Contrast    C-REACTIVE PROTEIN    Sedimentation rate    Stress test, pulmonary (Completed)          I will call with lab results and determine next step and follow up from there.

## 2024-03-11 LAB
1,3 BETA GLUCAN SER-MCNC: 118 PG/ML
FUNGITELL COMMENTS: POSITIVE

## 2024-03-12 ENCOUNTER — TELEPHONE (OUTPATIENT)
Dept: PULMONOLOGY | Facility: CLINIC | Age: 74
End: 2024-03-12
Payer: MEDICARE

## 2024-03-12 DIAGNOSIS — J84.9 INTERSTITIAL PULMONARY DISEASE, UNSPECIFIED: Primary | ICD-10-CM

## 2024-03-12 RX ORDER — PREDNISONE 10 MG/1
10 TABLET ORAL DAILY
Qty: 30 TABLET | Refills: 0 | Status: SHIPPED | OUTPATIENT
Start: 2024-03-22 | End: 2024-05-07

## 2024-03-12 RX ORDER — ONDANSETRON 4 MG/1
4 TABLET, ORALLY DISINTEGRATING ORAL EVERY 6 HOURS PRN
Qty: 20 TABLET | Refills: 0 | Status: SHIPPED | OUTPATIENT
Start: 2024-03-12

## 2024-03-12 NOTE — TELEPHONE ENCOUNTER
Called pt back to make sure that she received a call regarding her testing results. Her  answered the phone. Pt's  verbalized that they received a call from the provider to explain results to her.

## 2024-03-12 NOTE — TELEPHONE ENCOUNTER
----- Message from Ofelia Ferraro sent at 3/12/2024  1:25 PM CDT -----  Contact: pt  Pt has called 4 times about her lab results (she is concern about) and no one has called her back.    Type:  Test Results    Who Called:  pt  Name of Test (Lab/Mammo/Etc):  lab  Date of Test:  3/7  Ordering Provider:  [facility]  Where the [facility]  Would the patient rather a call back or a response via [facility]? phon  Best Call Back Number:  [phone] or [phone]  Additional Information:

## 2024-05-07 ENCOUNTER — OFFICE VISIT (OUTPATIENT)
Dept: PULMONOLOGY | Facility: CLINIC | Age: 74
End: 2024-05-07
Payer: MEDICARE

## 2024-05-07 ENCOUNTER — HOSPITAL ENCOUNTER (OUTPATIENT)
Dept: RADIOLOGY | Facility: HOSPITAL | Age: 74
Discharge: HOME | End: 2024-05-07
Attending: HOSPITALIST
Payer: MEDICARE

## 2024-05-07 VITALS
HEIGHT: 65 IN | RESPIRATION RATE: 21 BRPM | HEART RATE: 76 BPM | SYSTOLIC BLOOD PRESSURE: 110 MMHG | OXYGEN SATURATION: 98 % | WEIGHT: 143.5 LBS | BODY MASS INDEX: 23.91 KG/M2 | DIASTOLIC BLOOD PRESSURE: 66 MMHG

## 2024-05-07 DIAGNOSIS — J84.9 INTERSTITIAL PULMONARY DISEASE, UNSPECIFIED: ICD-10-CM

## 2024-05-07 DIAGNOSIS — J84.9 INTERSTITIAL LUNG DISEASE: ICD-10-CM

## 2024-05-07 DIAGNOSIS — R11.0 NAUSEA: Primary | ICD-10-CM

## 2024-05-07 PROCEDURE — 1159F MED LIST DOCD IN RCRD: CPT | Mod: CPTII,S$GLB,, | Performed by: HOSPITALIST

## 2024-05-07 PROCEDURE — 1160F RVW MEDS BY RX/DR IN RCRD: CPT | Mod: CPTII,S$GLB,, | Performed by: HOSPITALIST

## 2024-05-07 PROCEDURE — 3288F FALL RISK ASSESSMENT DOCD: CPT | Mod: CPTII,S$GLB,, | Performed by: HOSPITALIST

## 2024-05-07 PROCEDURE — 3078F DIAST BP <80 MM HG: CPT | Mod: CPTII,S$GLB,, | Performed by: HOSPITALIST

## 2024-05-07 PROCEDURE — 71250 CT THORAX DX C-: CPT | Mod: 26,,, | Performed by: RADIOLOGY

## 2024-05-07 PROCEDURE — 99999 PR PBB SHADOW E&M-EST. PATIENT-LVL IV: CPT | Mod: PBBFAC,,, | Performed by: HOSPITALIST

## 2024-05-07 PROCEDURE — 71250 CT THORAX DX C-: CPT | Mod: TC

## 2024-05-07 PROCEDURE — 1101F PT FALLS ASSESS-DOCD LE1/YR: CPT | Mod: CPTII,S$GLB,, | Performed by: HOSPITALIST

## 2024-05-07 PROCEDURE — 99213 OFFICE O/P EST LOW 20 MIN: CPT | Mod: S$GLB,,, | Performed by: HOSPITALIST

## 2024-05-07 PROCEDURE — 3074F SYST BP LT 130 MM HG: CPT | Mod: CPTII,S$GLB,, | Performed by: HOSPITALIST

## 2024-05-07 RX ORDER — ONDANSETRON 4 MG/1
4 TABLET, FILM COATED ORAL EVERY 8 HOURS PRN
COMMUNITY
Start: 2024-04-08

## 2024-05-07 RX ORDER — BUTALBITAL, ACETAMINOPHEN AND CAFFEINE 50; 325; 40 MG/1; MG/1; MG/1
1 TABLET ORAL EVERY 6 HOURS PRN
COMMUNITY
Start: 2024-03-18 | End: 2024-06-16

## 2024-05-07 RX ORDER — PROMETHAZINE HYDROCHLORIDE 25 MG/1
25 TABLET ORAL EVERY 6 HOURS PRN
COMMUNITY
Start: 2024-04-30 | End: 2024-05-07 | Stop reason: SDUPTHER

## 2024-05-07 RX ORDER — CYANOCOBALAMIN 1000 UG/ML
1000 INJECTION, SOLUTION INTRAMUSCULAR; SUBCUTANEOUS
COMMUNITY
Start: 2024-04-30

## 2024-05-07 RX ORDER — PROMETHAZINE HYDROCHLORIDE 25 MG/1
25 TABLET ORAL EVERY 6 HOURS PRN
Qty: 30 TABLET | Refills: 0 | Status: SHIPPED | OUTPATIENT
Start: 2024-05-07 | End: 2024-05-27 | Stop reason: SDUPTHER

## 2024-05-07 NOTE — PROGRESS NOTES
Subjective:      Patient ID: Gaby Stanley is a 74 y.o. female.    Chief Complaint: Interstitial Lung Disease    Interval Hx 5/7/24:    Mrs. Stanley presents to Pulmonary clinic for follow up ILD, hospital follow up. She was last seen 3/7/24- prednisone was extended, repeat fungitell (lower than prior), esr and crp ordered (mildly elevated and normal), macrobid was stopped.    Today she is accompanied by her spouse- he reports that she her cough has much improved and essentially resolved. Her energy is back. No hypoxemia. She did stop the macrobid, her last dose of Prednisone was last week.     Interim Work Up:  CT Chest- Significant improvement of bilateral ground glass and crazy paving, residual reticulation and more subtle right ground glass, persistent RLL scar     HPI 3/7/24:     73 year old female with history of depression, diverticulitis, GERD, HLD, HTN who is referred to Pulmonary clinic by Rachel Soto NP for hospital follow up recent admission for pneumonia.      Brief Admission Summary:   Pt presented to ED 2/16 with one week of progressive dry cough, weakness, and fatigue. CTA chest negative for PE, diffuse GGO throughout right lung, mild GGO left lung, subpleural reticulation and septal thickening. Flu/covid negative. Pulmonary was consulted and pt treated with Prednisone taper and CAP. She was discharged 2/19 on home oxygen, Levaqin, 20 day prednisone taper.      COVID June 2022- no hospitalization, no oxygen  Relapsing and remitting dry cough since at least September, possibly sooner  [place]  [place] smoking over 40 years ago, smoked 15 years     Hospital work up:  KYE positive- homogenous  Fungitell Positive (294)  Fungal immunodiffusion- negative (cocci, aspergillus, blasto, histo)  RF negative  ANCA negative     Today:  Overall doing much better than before, she does report being tired in the mornings, but that she has had difficulty sleeping since starting the Prednisone. She does  "continue to have a dry cough, but it is much better than before (was near constant). She has a pulse ox she keeps around her neck- has not seen her saturation below 90% over the past week. Has continued to wear oxygen at night, not sure when to wear during the day. Does have some sinus congestion as well. Her  provides additional history- she has been known to have a chronic cough for many years, going through many bottles of Robitussin. This has mostly been at night. She has one day left of prednisone.    Review of Systems   Respiratory:  Negative for cough, sputum production, shortness of breath, wheezing and dyspnea on extertion.      Objective:     Physical Exam   Constitutional: She is oriented to person, place, and time. She appears well-developed and well-nourished. She is not obese.   Cardiovascular: Normal rate and regular rhythm.   Pulmonary/Chest: Normal expansion, effort normal and breath sounds normal.   Musculoskeletal:         General: No edema.   Neurological: She is alert and oriented to person, place, and time.   Skin: Skin is warm and dry.     Personal Diagnostic Review  As Above      5/7/2024     1:24 PM 3/7/2024     2:08 PM 3/7/2024     1:07 PM 2/19/2024    11:44 AM 2/19/2024     7:40 AM 2/18/2024     9:00 PM 2/18/2024     4:36 PM   Pulmonary Function Tests   SpO2 98 %  92 % 93 % 97 % 93 % 95 %   Ordering Provider  Valerie Knowles PA-C        Performing nurse/tech/RT  VT, RT        Height 5' 5" (1.651 m) 5' 5" (1.651 m) 5' 5" (1.651 m)       Weight 65.1 kg (143 lb 8.3 oz) 63.7 kg (140 lb 6.9 oz) 63.7 kg (140 lb 6.9 oz)       BMI (Calculated) 23.9 23.4 23.4       Patient Race          6MWT Status  completed without stopping        Patient Reported  Dyspnea        Was O2 used?  No        6MW Distance walked (feet)  925 feet        Distance walked (meters)  281.94 meters        Did patient stop?  No        Type of assistive device(s) used?  no assistive devices        Oxygen " Saturation  93 %        Supplemental Oxygen  Room Air        Heart Rate  73 bpm        Blood Pressure  115/57        Yan Dyspnea Rating   nothing at all        Oxygen Saturation  94 %        Supplemental Oxygen  Room Air        Heart Rate  96 bpm        Blood Pressure  112/59        Yan Dyspnea Rating   moderate        Recovery Time (seconds)  240 seconds        Oxygen Saturation  98 %        Supplemental Oxygen  Room Air        Heart Rate  72 bpm        Blood Pressure  115/54        Yan Dyspnea Rating   light        Is procedure ready for interpretation?  Yes        Oxygen Qualification?  No             Assessment:     No diagnosis found.     Outpatient Encounter Medications as of 5/7/2024   Medication Sig Dispense Refill    alendronate (FOSAMAX) 70 MG tablet Take 70 mg by mouth every 7 days.      amLODIPine (NORVASC) 10 MG tablet Take 1 tablet by mouth every morning.      butalbital-acetaminophen-caffeine -40 mg (FIORICET, ESGIC) -40 mg per tablet Take 1 tablet by mouth every 6 (six) hours as needed.      cyanocobalamin 1,000 mcg/mL injection Inject 1,000 mcg into the muscle.      iron-vit c-b12-folic acid (ICAR-C PLUS) Tab Take 1 tablet by mouth every morning.      levoFLOXacin (LEVAQUIN) 750 MG tablet Take 1 tablet (750 mg total) by mouth once daily. 7 tablet 0    lipase-protease-amylase (ZENPEP) 25,000-85,000- 136,000 unit CpDR Take by mouth before meals and at bedtime as needed.      ondansetron (ZOFRAN) 4 MG tablet Take 4 mg by mouth every 8 (eight) hours as needed.      ondansetron (ZOFRAN-ODT) 4 MG TbDL Take 1 tablet (4 mg total) by mouth every 6 (six) hours as needed (Nausea). 20 tablet 0    pantoprazole (PROTONIX) 40 MG tablet Take 40 mg by mouth 2 (two) times daily.       predniSONE (DELTASONE) 10 MG tablet Take 1 tablet (10 mg total) by mouth once daily. 14 tablet 0    predniSONE (DELTASONE) 10 MG tablet Take 1 tablet (10 mg total) by mouth once daily. 30 tablet 0    promethazine  (PHENERGAN) 25 MG tablet Take 25 mg by mouth every 6 (six) hours as needed.      ROSUVASTATIN CALCIUM (CRESTOR ORAL) Take by mouth.      sertraline (ZOLOFT) 100 MG tablet Take 100 mg by mouth once daily.      SUMATRIPTAN SUCCINATE (IMITREX ORAL) Take by mouth as needed.       traZODone (DESYREL) 100 MG tablet Take 2 tablets by mouth every evening.      loratadine (CLARITIN) 10 mg tablet Take 10 mg by mouth once daily at 6am.       No facility-administered encounter medications on file as of 5/7/2024.     No orders of the defined types were placed in this encounter.    Plan:     Problem List Items Addressed This Visit          Pulmonary    Interstitial lung disease     - significant improvement of symptoms and CT findings with stopping macrobid and prednisone course   - close follow up in 3 months, sooner as needed for worsening cough, shortness of breath, hypoxemia  - CT Chest in 6 months for follow up or sooner if symptoms recur            Other Visit Diagnoses       Nausea    -  Primary    Relevant Medications    promethazine (PHENERGAN) 25 MG tablet          Plan discussed with pt and she expressed understanding, all questions answered. RTC 3 months or sooner as needed. Case and imaging reviewed with Dr. Patel.

## 2024-05-08 ENCOUNTER — LAB VISIT (OUTPATIENT)
Dept: LAB | Facility: HOSPITAL | Age: 74
End: 2024-05-08
Attending: UROLOGY
Payer: MEDICARE

## 2024-05-08 DIAGNOSIS — N39.0 URINARY TRACT INFECTION, SITE NOT SPECIFIED: ICD-10-CM

## 2024-05-08 LAB
BACTERIA #/AREA URNS AUTO: ABNORMAL /HPF
BILIRUB UR QL STRIP: ABNORMAL
CLARITY UR REFRACT.AUTO: CLEAR
COLOR UR AUTO: ABNORMAL
GLUCOSE UR QL STRIP: ABNORMAL
HGB UR QL STRIP: ABNORMAL
HYALINE CASTS UR QL AUTO: 0 /LPF
KETONES UR QL STRIP: ABNORMAL
LEUKOCYTE ESTERASE UR QL STRIP: ABNORMAL
MICROSCOPIC COMMENT: ABNORMAL
NITRITE UR QL STRIP: ABNORMAL
PH UR STRIP: ABNORMAL [PH] (ref 5–8)
PROT UR QL STRIP: ABNORMAL
RBC #/AREA URNS AUTO: 0 /HPF (ref 0–4)
SP GR UR STRIP: ABNORMAL (ref 1–1.03)
URN SPEC COLLECT METH UR: ABNORMAL
UROBILINOGEN UR STRIP-ACNC: ABNORMAL EU/DL
WBC #/AREA URNS AUTO: 8 /HPF (ref 0–5)

## 2024-05-08 PROCEDURE — 81000 URINALYSIS NONAUTO W/SCOPE: CPT | Mod: PO | Performed by: UROLOGY

## 2024-05-08 PROCEDURE — 87086 URINE CULTURE/COLONY COUNT: CPT | Performed by: UROLOGY

## 2024-05-09 ENCOUNTER — TELEPHONE (OUTPATIENT)
Dept: PULMONOLOGY | Facility: CLINIC | Age: 74
End: 2024-05-09
Payer: MEDICARE

## 2024-05-09 DIAGNOSIS — J84.9 INTERSTITIAL PULMONARY DISEASE, UNSPECIFIED: Primary | ICD-10-CM

## 2024-05-09 NOTE — TELEPHONE ENCOUNTER
Called and spoke to pt. Pt needed oxygen bottles picked up from her house. I provided the pt with the number for our DME department. Pt was appreciative and verbalized understanding of everything.

## 2024-05-09 NOTE — TELEPHONE ENCOUNTER
Returned pt's call. I called the pt back to let them know that I would be sending the HME order to DME so that her oxygen could be picked up. Pt was appreciative and verbalized understanding of everything.

## 2024-05-09 NOTE — TELEPHONE ENCOUNTER
----- Message from Amparo Sylvester sent at 5/9/2024  9:49 AM CDT -----  Who Called: Pt    What is the request in detail: Requesting call back to discuss need discharge for oxygen pickup. Please advise    Can the clinic reply by [facility]? No    Best Call Back Number: [phone]      Additional Information:

## 2024-05-09 NOTE — TELEPHONE ENCOUNTER
----- Message from Marie Barnes sent at 5/9/2024  9:28 AM CDT -----  Regarding: oxygen  Name of Who is Calling:  Pt  called         What is the request in detail:  The pt would like to  return the oxygen tank she has been off it for 3 months and he wants to speak with some one         Can the clinic reply by [facility]:    No       What Number to Call Back [facility]: Telephone Information:  Mobile          [phone]

## 2024-05-10 LAB — BACTERIA UR CULT: NORMAL

## 2024-05-20 PROBLEM — J18.9 CAP (COMMUNITY ACQUIRED PNEUMONIA): Status: RESOLVED | Noted: 2024-02-17 | Resolved: 2024-05-20

## 2024-05-24 ENCOUNTER — TELEPHONE (OUTPATIENT)
Dept: PULMONOLOGY | Facility: CLINIC | Age: 74
End: 2024-05-24
Payer: MEDICARE

## 2024-05-24 NOTE — TELEPHONE ENCOUNTER
----- Message from [facility] sent at 5/24/2024 12:01 PM CDT -----  Contact: Matthew  .Type:  Sooner Apoointment Request    Caller is requesting a sooner appointment.  Caller declined first available appointment listed below.  Caller will not accept being placed on the waitlist and is requesting a message be sent to doctor.  Name of Caller: [email or web address]   When is the first available appointment?June 13  Symptoms: Pt was seen in urgent care yesterday for cough. Told to make a one week f/u   Would the patient rather a call back or a response via [facility]?  Call   Best Call Back Number: .[phone]   Additional Information:

## 2024-05-24 NOTE — TELEPHONE ENCOUNTER
Called and spoke to pt. Pt wanted a sooner appt, but there was none with the provider she saw previously. I asked if she would be okay with seeing a different provider at the soonest appt available and she said that she was okay with it. I scheduled her and she agreed to the new appt date and time. Pt was appreciative and verbalized understanding of everything.

## 2024-05-27 ENCOUNTER — HOSPITAL ENCOUNTER (OUTPATIENT)
Dept: RADIOLOGY | Facility: HOSPITAL | Age: 74
Discharge: HOME | End: 2024-05-27
Attending: NURSE PRACTITIONER
Payer: MEDICARE

## 2024-05-27 ENCOUNTER — OFFICE VISIT (OUTPATIENT)
Dept: PULMONOLOGY | Facility: CLINIC | Age: 74
End: 2024-05-27
Payer: MEDICARE

## 2024-05-27 VITALS
RESPIRATION RATE: 17 BRPM | DIASTOLIC BLOOD PRESSURE: 70 MMHG | SYSTOLIC BLOOD PRESSURE: 116 MMHG | WEIGHT: 142.19 LBS | HEIGHT: 65 IN | TEMPERATURE: 97 F | OXYGEN SATURATION: 97 % | HEART RATE: 60 BPM | BODY MASS INDEX: 23.69 KG/M2

## 2024-05-27 DIAGNOSIS — J06.9 UPPER RESPIRATORY TRACT INFECTION, UNSPECIFIED TYPE: ICD-10-CM

## 2024-05-27 DIAGNOSIS — J31.0 RHINITIS, UNSPECIFIED TYPE: ICD-10-CM

## 2024-05-27 DIAGNOSIS — R11.0 NAUSEA: ICD-10-CM

## 2024-05-27 DIAGNOSIS — J84.9 INTERSTITIAL PULMONARY DISEASE, UNSPECIFIED: ICD-10-CM

## 2024-05-27 DIAGNOSIS — J84.9 INTERSTITIAL PULMONARY DISEASE, UNSPECIFIED: Primary | ICD-10-CM

## 2024-05-27 DIAGNOSIS — J84.9 INTERSTITIAL LUNG DISEASE: ICD-10-CM

## 2024-05-27 PROCEDURE — 3288F FALL RISK ASSESSMENT DOCD: CPT | Mod: CPTII,S$GLB,, | Performed by: NURSE PRACTITIONER

## 2024-05-27 PROCEDURE — 1159F MED LIST DOCD IN RCRD: CPT | Mod: CPTII,S$GLB,, | Performed by: NURSE PRACTITIONER

## 2024-05-27 PROCEDURE — 1101F PT FALLS ASSESS-DOCD LE1/YR: CPT | Mod: CPTII,S$GLB,, | Performed by: NURSE PRACTITIONER

## 2024-05-27 PROCEDURE — 3078F DIAST BP <80 MM HG: CPT | Mod: CPTII,S$GLB,, | Performed by: NURSE PRACTITIONER

## 2024-05-27 PROCEDURE — 99999 PR PBB SHADOW E&M-EST. PATIENT-LVL V: CPT | Mod: PBBFAC,,, | Performed by: NURSE PRACTITIONER

## 2024-05-27 PROCEDURE — 1160F RVW MEDS BY RX/DR IN RCRD: CPT | Mod: CPTII,S$GLB,, | Performed by: NURSE PRACTITIONER

## 2024-05-27 PROCEDURE — 71046 X-RAY EXAM CHEST 2 VIEWS: CPT | Mod: TC

## 2024-05-27 PROCEDURE — 3074F SYST BP LT 130 MM HG: CPT | Mod: CPTII,S$GLB,, | Performed by: NURSE PRACTITIONER

## 2024-05-27 PROCEDURE — 99214 OFFICE O/P EST MOD 30 MIN: CPT | Mod: S$GLB,,, | Performed by: NURSE PRACTITIONER

## 2024-05-27 PROCEDURE — 71046 X-RAY EXAM CHEST 2 VIEWS: CPT | Mod: 26,,, | Performed by: RADIOLOGY

## 2024-05-27 RX ORDER — BENZONATATE 100 MG/1
100 CAPSULE ORAL 3 TIMES DAILY PRN
COMMUNITY
Start: 2024-05-24 | End: 2024-05-31

## 2024-05-27 RX ORDER — AZELASTINE 1 MG/ML
1 SPRAY, METERED NASAL 2 TIMES DAILY
Qty: 30 ML | Refills: 3 | Status: SHIPPED | OUTPATIENT
Start: 2024-05-27 | End: 2025-05-27

## 2024-05-27 RX ORDER — PROMETHAZINE HYDROCHLORIDE 25 MG/1
25 TABLET ORAL EVERY 6 HOURS PRN
Qty: 30 TABLET | Refills: 0 | Status: SHIPPED | OUTPATIENT
Start: 2024-05-27

## 2024-05-27 NOTE — PROGRESS NOTES
Subjective:      Patient ID: Gaby Stanley is a 74 y.o. female.    Chief Complaint: Cough    HPI  5/27/24  Follow up urgent care 5/24/24. Dx with viral URI after she went on a trip with family. Her daughter is also sick.   C/o wet cough, nasal congestion, chills, headache x 3 days. Covid test negative. Following with Valerie Knowles for abnormal CT scan- ILD and pneumonia with shown improvement.  Currently she has rhinitis and cough triggered by post nasal drip. Afebrile.    UC summary:        5/7/24 Valerie Knowles, PA  Interval Hx 5/7/24:     Mrs. Stanley presents to Pulmonary clinic for follow up ILD, hospital follow up. She was last seen 3/7/24- prednisone was extended, repeat fungitell (lower than prior), esr and crp ordered (mildly elevated and normal), macrobid was stopped.     Today she is accompanied by her spouse- he reports that she her cough has much improved and essentially resolved. Her energy is back. No hypoxemia. She did stop the macrobid, her last dose of Prednisone was last week.     - significant improvement of symptoms and CT findings with stopping macrobid and prednisone course   - close follow up in 3 months, sooner as needed for worsening cough, shortness of breath, hypoxemia  - CT Chest in 6 months for follow up or sooner if symptoms recur     Interim Work Up:  CT Chest- Significant improvement of bilateral ground glass and crazy paving, residual reticulation and more subtle right ground glass, persistent RLL scar      HPI 3/7/24:      73 year old female with history of depression, diverticulitis, GERD, HLD, HTN who is referred to Pulmonary clinic by Rachel Soto NP for hospital follow up recent admission for pneumonia.      Brief Admission Summary:   Pt presented to ED 2/16 with one week of progressive dry cough, weakness, and fatigue. CTA chest negative for PE, diffuse GGO throughout right lung, mild GGO left lung, subpleural reticulation and septal thickening. Flu/covid negative.  "Pulmonary was consulted and pt treated with Prednisone taper and CAP. She was discharged 2/19 on home oxygen, Levaqin, 20 day prednisone taper.      COVID June 2022- no hospitalization, no oxygen  Relapsing and remitting dry cough since at least September, possibly sooner  [place]  Quit smoking over 40 years ago, smoked 15 years     Hospital work up:  KYE positive- homogenous  Fungitell Positive (294)  Fungal immunodiffusion- negative (cocci, aspergillus, blasto, histo)  RF negative  ANCA negative      Patient Active Problem List   Diagnosis    Anxiety    Migraine    Hyperlipidemia LDL goal <130    Hypertension    Pernicious anemia    GERD (gastroesophageal reflux disease)    Cough    Abnormal CT of the chest    Interstitial lung disease     /70   Pulse 60   Temp 97.3 °F (36.3 °C)   Resp 17   Ht 5' 5" (1.651 m)   Wt 64.5 kg (142 lb 3.2 oz)   SpO2 97%   BMI 23.66 kg/m²   Body mass index is 23.66 kg/m².    Review of Systems   Constitutional:  Negative for fever.   HENT:  Positive for rhinorrhea and congestion.    Respiratory:  Positive for cough.    All other systems reviewed and are negative.    Objective:      Physical Exam  Constitutional:       Appearance: Normal appearance. She is well-developed.   HENT:      Head: Normocephalic and atraumatic.      Nose: Rhinorrhea present.   Cardiovascular:      Rate and Rhythm: Normal rate and regular rhythm.      Heart sounds: No murmur heard.     No gallop.   Pulmonary:      Effort: Pulmonary effort is normal.      Breath sounds: Normal breath sounds.   Abdominal:      Palpations: Abdomen is soft.      Tenderness: There is no abdominal tenderness.   Musculoskeletal:         General: Normal range of motion.      Cervical back: Normal range of motion and neck supple.   Skin:     General: Skin is warm and dry.   Neurological:      Mental Status: She is alert and oriented to person, place, and time.   Psychiatric:         Mood and Affect: Mood normal.   "       Behavior: Behavior normal.       Personal Diagnostic Review    Results for orders placed during the hospital encounter of 02/16/24    X-Ray Chest PA And Lateral    Narrative  EXAMINATION:  XR CHEST PA AND LATERAL    CLINICAL HISTORY:  Chest Pain;    TECHNIQUE:  PA and lateral views of the chest were performed.    COMPARISON:  10/07/2020    FINDINGS:  There is right midlung interstitial and hazy airspace opacity.  There is lesser left lung peripheral interstitial opacity.  No sizable pleural effusion.  Mediastinum rotated    Impression  Bilateral mixed interstitial and airspace pulmonary opacity      Electronically signed by: Dominique Shirley  Date:    02/16/2024  Time:    22:58        Assessment:       1. Interstitial pulmonary disease, unspecified    2. Interstitial lung disease    3. Upper respiratory tract infection, unspecified type    4. Nausea    5. Rhinitis, unspecified type        Outpatient Encounter Medications as of 5/27/2024   Medication Sig Dispense Refill    alendronate (FOSAMAX) 70 MG tablet Take 70 mg by mouth every 7 days.      amLODIPine (NORVASC) 10 MG tablet Take 1 tablet by mouth every morning.      benzonatate (TESSALON) 100 MG capsule Take 100 mg by mouth 3 (three) times daily as needed.      butalbital-acetaminophen-caffeine -40 mg (FIORICET, ESGIC) -40 mg per tablet Take 1 tablet by mouth every 6 (six) hours as needed.      cyanocobalamin 1,000 mcg/mL injection Inject 1,000 mcg into the muscle.      iron-vit c-b12-folic acid (ICAR-C PLUS) Tab Take 1 tablet by mouth every morning.      levoFLOXacin (LEVAQUIN) 750 MG tablet Take 1 tablet (750 mg total) by mouth once daily. 7 tablet 0    lipase-protease-amylase (ZENPEP) 25,000-85,000- 136,000 unit CpDR Take by mouth before meals and at bedtime as needed.      ondansetron (ZOFRAN) 4 MG tablet Take 4 mg by mouth every 8 (eight) hours as needed.      ondansetron (ZOFRAN-ODT) 4 MG TbDL Take 1 tablet (4 mg total) by mouth every  6 (six) hours as needed (Nausea). 20 tablet 0    pantoprazole (PROTONIX) 40 MG tablet Take 40 mg by mouth 2 (two) times daily.       predniSONE (DELTASONE) 10 MG tablet Take 1 tablet (10 mg total) by mouth once daily. 14 tablet 0    ROSUVASTATIN CALCIUM (CRESTOR ORAL) Take by mouth.      sertraline (ZOLOFT) 100 MG tablet Take 100 mg by mouth once daily.      SUMATRIPTAN SUCCINATE (IMITREX ORAL) Take by mouth as needed.       traZODone (DESYREL) 100 MG tablet Take 2 tablets by mouth every evening.      [DISCONTINUED] promethazine (PHENERGAN) 25 MG tablet Take 1 tablet (25 mg total) by mouth every 6 (six) hours as needed for Nausea. 30 tablet 0    azelastine (ASTELIN) 137 mcg (0.1 %) nasal spray 1 spray (137 mcg total) by Nasal route 2 (two) times daily. 30 mL 3    loratadine (CLARITIN) 10 mg tablet Take 10 mg by mouth once daily at 6am.      promethazine (PHENERGAN) 25 MG tablet Take 1 tablet (25 mg total) by mouth every 6 (six) hours as needed for Nausea. 30 tablet 0     No facility-administered encounter medications on file as of 5/27/2024.     Orders Placed This Encounter   Procedures    X-Ray Chest PA And Lateral     Standing Status:   Future     Standing Expiration Date:   5/27/2025     Order Specific Question:   May the Radiologist modify the order per protocol to meet the clinical needs of the patient?     Answer:   Yes     Order Specific Question:   Release to patient     Answer:   Immediate     Plan:     Recent  visit for URI.   No CXR performed. CXR today to rule out pneumonia as advised   Continue current tx. Add astelin    1. Interstitial pulmonary disease, unspecified  -     X-Ray Chest PA And Lateral; Future; Expected date: 05/27/2024    2. Interstitial lung disease    3. Upper respiratory tract infection, unspecified type  -     X-Ray Chest PA And Lateral; Future; Expected date: 05/27/2024    4. Nausea  -     promethazine (PHENERGAN) 25 MG tablet; Take 1 tablet (25 mg total) by mouth every 6 (six)  hours as needed for Nausea.  Dispense: 30 tablet; Refill: 0    5. Rhinitis, unspecified type  -     azelastine (ASTELIN) 137 mcg (0.1 %) nasal spray; 1 spray (137 mcg total) by Nasal route 2 (two) times daily.  Dispense: 30 mL; Refill: 3                        Elizabeth LeJeune, ACNP, ANP

## 2024-06-24 ENCOUNTER — OFFICE VISIT (OUTPATIENT)
Dept: INTERNAL MEDICINE | Facility: CLINIC | Age: 74
End: 2024-06-24
Payer: MEDICARE

## 2024-06-24 ENCOUNTER — HOSPITAL ENCOUNTER (INPATIENT)
Facility: HOSPITAL | Age: 74
LOS: 4 days | Discharge: HOME | DRG: 871 | End: 2024-06-28
Attending: EMERGENCY MEDICINE | Admitting: SPECIALIST
Payer: MEDICARE

## 2024-06-24 VITALS
WEIGHT: 142 LBS | HEART RATE: 100 BPM | DIASTOLIC BLOOD PRESSURE: 50 MMHG | OXYGEN SATURATION: 89 % | TEMPERATURE: 100 F | SYSTOLIC BLOOD PRESSURE: 80 MMHG | BODY MASS INDEX: 23.66 KG/M2 | HEIGHT: 65 IN | RESPIRATION RATE: 18 BRPM

## 2024-06-24 DIAGNOSIS — Z45.2 ENCOUNTER FOR CENTRAL LINE PLACEMENT: ICD-10-CM

## 2024-06-24 DIAGNOSIS — R06.02 SOB (SHORTNESS OF BREATH): ICD-10-CM

## 2024-06-24 DIAGNOSIS — A41.9 SEPTIC SHOCK: ICD-10-CM

## 2024-06-24 DIAGNOSIS — R50.9 FEVER, UNSPECIFIED FEVER CAUSE: Primary | ICD-10-CM

## 2024-06-24 DIAGNOSIS — E87.20 LACTIC ACIDOSIS: ICD-10-CM

## 2024-06-24 DIAGNOSIS — R11.0 NAUSEA: ICD-10-CM

## 2024-06-24 DIAGNOSIS — R06.02 SHORTNESS OF BREATH: ICD-10-CM

## 2024-06-24 DIAGNOSIS — R65.21 SEPTIC SHOCK: ICD-10-CM

## 2024-06-24 DIAGNOSIS — I95.9 HYPOTENSION, UNSPECIFIED HYPOTENSION TYPE: Primary | ICD-10-CM

## 2024-06-24 LAB
ALBUMIN SERPL BCP-MCNC: 3.8 G/DL (ref 3.5–5.2)
ALP SERPL-CCNC: 110 U/L (ref 55–135)
ALT SERPL W/O P-5'-P-CCNC: 14 U/L (ref 10–44)
ANION GAP SERPL CALC-SCNC: 10 MMOL/L (ref 8–16)
ANION GAP SERPL CALC-SCNC: 14 MMOL/L (ref 8–16)
AST SERPL-CCNC: 25 U/L (ref 10–40)
BASOPHILS # BLD AUTO: ABNORMAL K/UL (ref 0–0.2)
BASOPHILS NFR BLD: 0 % (ref 0–1.9)
BASOPHILS NFR BLD: 0 % (ref 0–1.9)
BILIRUB SERPL-MCNC: 0.5 MG/DL (ref 0.1–1)
BILIRUB UR QL STRIP: NEGATIVE
BNP SERPL-MCNC: 562 PG/ML (ref 0–99)
BUN SERPL-MCNC: 12 MG/DL (ref 8–23)
BUN SERPL-MCNC: 12 MG/DL (ref 8–23)
CALCIUM SERPL-MCNC: 7.9 MG/DL (ref 8.7–10.5)
CALCIUM SERPL-MCNC: 9.3 MG/DL (ref 8.7–10.5)
CHLORIDE SERPL-SCNC: 106 MMOL/L (ref 95–110)
CHLORIDE SERPL-SCNC: 113 MMOL/L (ref 95–110)
CLARITY UR REFRACT.AUTO: CLEAR
CO2 SERPL-SCNC: 19 MMOL/L (ref 23–29)
CO2 SERPL-SCNC: 24 MMOL/L (ref 23–29)
COLOR UR AUTO: YELLOW
CREAT SERPL-MCNC: 0.8 MG/DL (ref 0.5–1.4)
CREAT SERPL-MCNC: 1.3 MG/DL (ref 0.5–1.4)
CTP QC/QA: YES
CTP QC/QA: YES
DIFFERENTIAL METHOD BLD: ABNORMAL
DIFFERENTIAL METHOD BLD: ABNORMAL
EOSINOPHIL # BLD AUTO: ABNORMAL K/UL (ref 0–0.5)
EOSINOPHIL NFR BLD: 0 % (ref 0–8)
EOSINOPHIL NFR BLD: 1 % (ref 0–8)
ERYTHROCYTE [DISTWIDTH] IN BLOOD BY AUTOMATED COUNT: 14.5 % (ref 11.5–14.5)
ERYTHROCYTE [DISTWIDTH] IN BLOOD BY AUTOMATED COUNT: 14.9 % (ref 11.5–14.5)
EST. GFR  (NO RACE VARIABLE): 43.1 ML/MIN/1.73 M^2
EST. GFR  (NO RACE VARIABLE): >60 ML/MIN/1.73 M^2
GLUCOSE SERPL-MCNC: 106 MG/DL (ref 70–110)
GLUCOSE SERPL-MCNC: 81 MG/DL (ref 70–110)
GLUCOSE UR QL STRIP: NEGATIVE
HCT VFR BLD AUTO: 27.3 % (ref 37–48.5)
HCT VFR BLD AUTO: 34.8 % (ref 37–48.5)
HGB BLD-MCNC: 11.4 G/DL (ref 12–16)
HGB BLD-MCNC: 8.9 G/DL (ref 12–16)
HGB UR QL STRIP: NEGATIVE
IMM GRANULOCYTES # BLD AUTO: ABNORMAL K/UL (ref 0–0.04)
IMM GRANULOCYTES # BLD AUTO: ABNORMAL K/UL (ref 0–0.04)
IMM GRANULOCYTES NFR BLD AUTO: ABNORMAL % (ref 0–0.5)
IMM GRANULOCYTES NFR BLD AUTO: ABNORMAL % (ref 0–0.5)
KETONES UR QL STRIP: NEGATIVE
LACTATE SERPL-SCNC: 1.3 MMOL/L (ref 0.5–2.2)
LACTATE SERPL-SCNC: 1.6 MMOL/L (ref 0.5–2.2)
LACTATE SERPL-SCNC: 2.7 MMOL/L (ref 0.5–2.2)
LACTATE SERPL-SCNC: 2.7 MMOL/L (ref 0.5–2.2)
LEUKOCYTE ESTERASE UR QL STRIP: NEGATIVE
LYMPHOCYTES # BLD AUTO: ABNORMAL K/UL (ref 1–4.8)
LYMPHOCYTES NFR BLD: 11 % (ref 18–48)
LYMPHOCYTES NFR BLD: 6 % (ref 18–48)
MAGNESIUM SERPL-MCNC: 1.2 MG/DL (ref 1.6–2.6)
MAGNESIUM SERPL-MCNC: 1.8 MG/DL (ref 1.6–2.6)
MCH RBC QN AUTO: 28.6 PG (ref 27–31)
MCH RBC QN AUTO: 28.8 PG (ref 27–31)
MCHC RBC AUTO-ENTMCNC: 32.6 G/DL (ref 32–36)
MCHC RBC AUTO-ENTMCNC: 32.8 G/DL (ref 32–36)
MCV RBC AUTO: 87 FL (ref 82–98)
MCV RBC AUTO: 88 FL (ref 82–98)
METAMYELOCYTES NFR BLD MANUAL: 4 %
MONOCYTES # BLD AUTO: ABNORMAL K/UL (ref 0.3–1)
MONOCYTES NFR BLD: 2 % (ref 4–15)
MONOCYTES NFR BLD: 8 % (ref 4–15)
NEUTROPHILS NFR BLD: 60 % (ref 38–73)
NEUTROPHILS NFR BLD: 71 % (ref 38–73)
NEUTS BAND NFR BLD MANUAL: 17 %
NEUTS BAND NFR BLD MANUAL: 20 %
NITRITE UR QL STRIP: NEGATIVE
NRBC BLD-RTO: 0 /100 WBC
NRBC BLD-RTO: 0 /100 WBC
PH UR STRIP: 6 [PH] (ref 5–8)
PLATELET # BLD AUTO: 142 K/UL (ref 150–450)
PLATELET # BLD AUTO: 163 K/UL (ref 150–450)
PLATELET BLD QL SMEAR: ABNORMAL
PMV BLD AUTO: 10.6 FL (ref 9.2–12.9)
PMV BLD AUTO: 10.8 FL (ref 9.2–12.9)
POC MOLECULAR INFLUENZA A AGN: NEGATIVE
POC MOLECULAR INFLUENZA B AGN: NEGATIVE
POTASSIUM SERPL-SCNC: 2.8 MMOL/L (ref 3.5–5.1)
POTASSIUM SERPL-SCNC: 3.4 MMOL/L (ref 3.5–5.1)
PROCALCITONIN SERPL IA-MCNC: 26.45 NG/ML
PROT SERPL-MCNC: 7.1 G/DL (ref 6–8.4)
PROT UR QL STRIP: NEGATIVE
RBC # BLD AUTO: 3.09 M/UL (ref 4–5.4)
RBC # BLD AUTO: 3.98 M/UL (ref 4–5.4)
SARS-COV-2 RDRP RESP QL NAA+PROBE: NEGATIVE
SODIUM SERPL-SCNC: 142 MMOL/L (ref 136–145)
SODIUM SERPL-SCNC: 144 MMOL/L (ref 136–145)
SP GR UR STRIP: 1.01 (ref 1–1.03)
TROPONIN I SERPL DL<=0.01 NG/ML-MCNC: 0.04 NG/ML (ref 0–0.03)
TROPONIN I SERPL DL<=0.01 NG/ML-MCNC: 0.06 NG/ML (ref 0–0.03)
URN SPEC COLLECT METH UR: NORMAL
UROBILINOGEN UR STRIP-ACNC: NEGATIVE EU/DL
WBC # BLD AUTO: 11.4 K/UL (ref 3.9–12.7)
WBC # BLD AUTO: 7.06 K/UL (ref 3.9–12.7)

## 2024-06-24 PROCEDURE — 83605 ASSAY OF LACTIC ACID: CPT | Mod: 91 | Performed by: NURSE PRACTITIONER

## 2024-06-24 PROCEDURE — 1159F MED LIST DOCD IN RCRD: CPT | Mod: CPTII,S$GLB,, | Performed by: NURSE PRACTITIONER

## 2024-06-24 PROCEDURE — 63600175 PHARM REV CODE 636 W HCPCS: Mod: ER | Performed by: EMERGENCY MEDICINE

## 2024-06-24 PROCEDURE — 81003 URINALYSIS AUTO W/O SCOPE: CPT | Mod: ER | Performed by: EMERGENCY MEDICINE

## 2024-06-24 PROCEDURE — 80053 COMPREHEN METABOLIC PANEL: CPT | Mod: ER | Performed by: EMERGENCY MEDICINE

## 2024-06-24 PROCEDURE — 87502 INFLUENZA DNA AMP PROBE: CPT | Mod: QW,S$GLB,, | Performed by: NURSE PRACTITIONER

## 2024-06-24 PROCEDURE — 3074F SYST BP LT 130 MM HG: CPT | Mod: CPTII,S$GLB,, | Performed by: NURSE PRACTITIONER

## 2024-06-24 PROCEDURE — 96368 THER/DIAG CONCURRENT INF: CPT | Mod: ER

## 2024-06-24 PROCEDURE — 83605 ASSAY OF LACTIC ACID: CPT | Mod: 91,ER | Performed by: EMERGENCY MEDICINE

## 2024-06-24 PROCEDURE — 25000003 PHARM REV CODE 250: Mod: ER | Performed by: EMERGENCY MEDICINE

## 2024-06-24 PROCEDURE — 84484 ASSAY OF TROPONIN QUANT: CPT | Mod: ER | Performed by: EMERGENCY MEDICINE

## 2024-06-24 PROCEDURE — 84484 ASSAY OF TROPONIN QUANT: CPT | Mod: 91,ER | Performed by: NURSE PRACTITIONER

## 2024-06-24 PROCEDURE — 94761 N-INVAS EAR/PLS OXIMETRY MLT: CPT | Mod: ER

## 2024-06-24 PROCEDURE — 99213 OFFICE O/P EST LOW 20 MIN: CPT | Mod: S$GLB,,, | Performed by: NURSE PRACTITIONER

## 2024-06-24 PROCEDURE — 1125F AMNT PAIN NOTED PAIN PRSNT: CPT | Mod: CPTII,S$GLB,, | Performed by: NURSE PRACTITIONER

## 2024-06-24 PROCEDURE — 36556 INSERT NON-TUNNEL CV CATH: CPT | Mod: RT,ER

## 2024-06-24 PROCEDURE — 82533 TOTAL CORTISOL: CPT | Performed by: NURSE PRACTITIONER

## 2024-06-24 PROCEDURE — 93010 ELECTROCARDIOGRAM REPORT: CPT | Mod: ,,, | Performed by: STUDENT IN AN ORGANIZED HEALTH CARE EDUCATION/TRAINING PROGRAM

## 2024-06-24 PROCEDURE — 63600175 PHARM REV CODE 636 W HCPCS: Performed by: NURSE PRACTITIONER

## 2024-06-24 PROCEDURE — 83735 ASSAY OF MAGNESIUM: CPT | Mod: ER | Performed by: EMERGENCY MEDICINE

## 2024-06-24 PROCEDURE — 20000000 HC ICU ROOM

## 2024-06-24 PROCEDURE — 85027 COMPLETE CBC AUTOMATED: CPT | Mod: ER | Performed by: EMERGENCY MEDICINE

## 2024-06-24 PROCEDURE — 84484 ASSAY OF TROPONIN QUANT: CPT | Mod: 91 | Performed by: NURSE PRACTITIONER

## 2024-06-24 PROCEDURE — 96375 TX/PRO/DX INJ NEW DRUG ADDON: CPT | Mod: ER

## 2024-06-24 PROCEDURE — 3008F BODY MASS INDEX DOCD: CPT | Mod: CPTII,S$GLB,, | Performed by: NURSE PRACTITIONER

## 2024-06-24 PROCEDURE — 87635 SARS-COV-2 COVID-19 AMP PRB: CPT | Mod: QW,S$GLB,, | Performed by: NURSE PRACTITIONER

## 2024-06-24 PROCEDURE — 99999 PR PBB SHADOW E&M-EST. PATIENT-LVL IV: CPT | Mod: PBBFAC,,, | Performed by: NURSE PRACTITIONER

## 2024-06-24 PROCEDURE — 99291 CRITICAL CARE FIRST HOUR: CPT | Mod: ER

## 2024-06-24 PROCEDURE — 25000003 PHARM REV CODE 250: Performed by: NURSE PRACTITIONER

## 2024-06-24 PROCEDURE — 83880 ASSAY OF NATRIURETIC PEPTIDE: CPT | Mod: ER | Performed by: EMERGENCY MEDICINE

## 2024-06-24 PROCEDURE — 93005 ELECTROCARDIOGRAM TRACING: CPT | Mod: ER

## 2024-06-24 PROCEDURE — 3078F DIAST BP <80 MM HG: CPT | Mod: CPTII,S$GLB,, | Performed by: NURSE PRACTITIONER

## 2024-06-24 PROCEDURE — 83605 ASSAY OF LACTIC ACID: CPT | Mod: 91,ER | Performed by: NURSE PRACTITIONER

## 2024-06-24 PROCEDURE — 85007 BL SMEAR W/DIFF WBC COUNT: CPT | Mod: ER | Performed by: EMERGENCY MEDICINE

## 2024-06-24 PROCEDURE — 83735 ASSAY OF MAGNESIUM: CPT | Mod: 91 | Performed by: NURSE PRACTITIONER

## 2024-06-24 PROCEDURE — 1101F PT FALLS ASSESS-DOCD LE1/YR: CPT | Mod: CPTII,S$GLB,, | Performed by: NURSE PRACTITIONER

## 2024-06-24 PROCEDURE — 84145 PROCALCITONIN (PCT): CPT | Mod: ER | Performed by: NURSE PRACTITIONER

## 2024-06-24 PROCEDURE — 27000221 HC OXYGEN, UP TO 24 HOURS: Mod: ER

## 2024-06-24 PROCEDURE — 02HV33Z INSERTION OF INFUSION DEVICE INTO SUPERIOR VENA CAVA, PERCUTANEOUS APPROACH: ICD-10-PCS | Performed by: EMERGENCY MEDICINE

## 2024-06-24 PROCEDURE — 80048 BASIC METABOLIC PNL TOTAL CA: CPT | Mod: XB | Performed by: NURSE PRACTITIONER

## 2024-06-24 PROCEDURE — 96361 HYDRATE IV INFUSION ADD-ON: CPT | Mod: ER

## 2024-06-24 PROCEDURE — 1160F RVW MEDS BY RX/DR IN RCRD: CPT | Mod: CPTII,S$GLB,, | Performed by: NURSE PRACTITIONER

## 2024-06-24 PROCEDURE — 85027 COMPLETE CBC AUTOMATED: CPT | Mod: 91 | Performed by: NURSE PRACTITIONER

## 2024-06-24 PROCEDURE — 36415 COLL VENOUS BLD VENIPUNCTURE: CPT | Performed by: NURSE PRACTITIONER

## 2024-06-24 PROCEDURE — 87040 BLOOD CULTURE FOR BACTERIA: CPT | Mod: 59 | Performed by: EMERGENCY MEDICINE

## 2024-06-24 PROCEDURE — 96365 THER/PROPH/DIAG IV INF INIT: CPT | Mod: ER

## 2024-06-24 PROCEDURE — 3288F FALL RISK ASSESSMENT DOCD: CPT | Mod: CPTII,S$GLB,, | Performed by: NURSE PRACTITIONER

## 2024-06-24 PROCEDURE — 25000003 PHARM REV CODE 250: Mod: ER | Performed by: NURSE PRACTITIONER

## 2024-06-24 PROCEDURE — 85007 BL SMEAR W/DIFF WBC COUNT: CPT | Mod: 91 | Performed by: NURSE PRACTITIONER

## 2024-06-24 RX ORDER — ONDANSETRON HYDROCHLORIDE 2 MG/ML
4 INJECTION, SOLUTION INTRAVENOUS
Status: COMPLETED | OUTPATIENT
Start: 2024-06-24 | End: 2024-06-24

## 2024-06-24 RX ORDER — BUTALBITAL, ACETAMINOPHEN AND CAFFEINE 50; 325; 40 MG/1; MG/1; MG/1
1 TABLET ORAL EVERY 6 HOURS PRN
Status: DISCONTINUED | OUTPATIENT
Start: 2024-06-24 | End: 2024-06-28 | Stop reason: HOSPADM

## 2024-06-24 RX ORDER — SODIUM,POTASSIUM PHOSPHATES 280-250MG
2 POWDER IN PACKET (EA) ORAL
Status: DISCONTINUED | OUTPATIENT
Start: 2024-06-24 | End: 2024-06-25

## 2024-06-24 RX ORDER — OMEPRAZOLE 10 MG/1
40 CAPSULE, DELAYED RELEASE ORAL DAILY
COMMUNITY

## 2024-06-24 RX ORDER — SERTRALINE HYDROCHLORIDE 50 MG/1
100 TABLET, FILM COATED ORAL DAILY
Status: DISCONTINUED | OUTPATIENT
Start: 2024-06-25 | End: 2024-06-28 | Stop reason: HOSPADM

## 2024-06-24 RX ORDER — ENOXAPARIN SODIUM 100 MG/ML
40 INJECTION SUBCUTANEOUS EVERY 24 HOURS
Status: DISCONTINUED | OUTPATIENT
Start: 2024-06-25 | End: 2024-06-28 | Stop reason: HOSPADM

## 2024-06-24 RX ORDER — ACETAMINOPHEN 325 MG/1
650 TABLET ORAL
Status: COMPLETED | OUTPATIENT
Start: 2024-06-24 | End: 2024-06-24

## 2024-06-24 RX ORDER — POTASSIUM CHLORIDE 7.45 MG/ML
10 INJECTION INTRAVENOUS
Status: COMPLETED | OUTPATIENT
Start: 2024-06-24 | End: 2024-06-24

## 2024-06-24 RX ORDER — LANOLIN ALCOHOL/MO/W.PET/CERES
800 CREAM (GRAM) TOPICAL
Status: DISCONTINUED | OUTPATIENT
Start: 2024-06-24 | End: 2024-06-25

## 2024-06-24 RX ORDER — SODIUM CHLORIDE 9 MG/ML
1000 INJECTION, SOLUTION INTRAVENOUS
Status: COMPLETED | OUTPATIENT
Start: 2024-06-24 | End: 2024-06-24

## 2024-06-24 RX ORDER — ONDANSETRON HYDROCHLORIDE 2 MG/ML
8 INJECTION, SOLUTION INTRAVENOUS EVERY 6 HOURS PRN
Status: DISCONTINUED | OUTPATIENT
Start: 2024-06-24 | End: 2024-06-28 | Stop reason: HOSPADM

## 2024-06-24 RX ORDER — NOREPINEPHRINE BITARTRATE/D5W 4MG/250ML
0-3 PLASTIC BAG, INJECTION (ML) INTRAVENOUS CONTINUOUS
Status: DISCONTINUED | OUTPATIENT
Start: 2024-06-24 | End: 2024-06-25

## 2024-06-24 RX ORDER — MUPIROCIN 20 MG/G
OINTMENT TOPICAL 2 TIMES DAILY
Status: DISCONTINUED | OUTPATIENT
Start: 2024-06-24 | End: 2024-06-28 | Stop reason: HOSPADM

## 2024-06-24 RX ORDER — MAGNESIUM SULFATE HEPTAHYDRATE 40 MG/ML
2 INJECTION, SOLUTION INTRAVENOUS
Status: COMPLETED | OUTPATIENT
Start: 2024-06-24 | End: 2024-06-24

## 2024-06-24 RX ORDER — PANTOPRAZOLE SODIUM 40 MG/1
40 TABLET, DELAYED RELEASE ORAL DAILY
Status: DISCONTINUED | OUTPATIENT
Start: 2024-06-25 | End: 2024-06-25

## 2024-06-24 RX ORDER — POTASSIUM CHLORIDE 20 MEQ/1
40 TABLET, EXTENDED RELEASE ORAL
Status: DISCONTINUED | OUTPATIENT
Start: 2024-06-24 | End: 2024-06-24

## 2024-06-24 RX ORDER — IPRATROPIUM BROMIDE AND ALBUTEROL SULFATE 2.5; .5 MG/3ML; MG/3ML
3 SOLUTION RESPIRATORY (INHALATION) EVERY 4 HOURS PRN
Status: DISCONTINUED | OUTPATIENT
Start: 2024-06-24 | End: 2024-06-28 | Stop reason: HOSPADM

## 2024-06-24 RX ORDER — ACETAMINOPHEN 325 MG/1
650 TABLET ORAL EVERY 6 HOURS PRN
Status: DISCONTINUED | OUTPATIENT
Start: 2024-06-24 | End: 2024-06-28 | Stop reason: HOSPADM

## 2024-06-24 RX ORDER — BUTALBITAL, ACETAMINOPHEN AND CAFFEINE 50; 325; 40 MG/1; MG/1; MG/1
1 TABLET ORAL EVERY 6 HOURS PRN
COMMUNITY

## 2024-06-24 RX ADMIN — MUPIROCIN: 20 OINTMENT TOPICAL at 10:06

## 2024-06-24 RX ADMIN — PROMETHAZINE HYDROCHLORIDE 25 MG: 25 INJECTION INTRAMUSCULAR; INTRAVENOUS at 01:06

## 2024-06-24 RX ADMIN — BUTALBITAL, ACETAMINOPHEN, AND CAFFEINE 1 TABLET: 325; 50; 40 TABLET ORAL at 10:06

## 2024-06-24 RX ADMIN — ONDANSETRON 4 MG: 2 INJECTION INTRAMUSCULAR; INTRAVENOUS at 05:06

## 2024-06-24 RX ADMIN — PROMETHAZINE HYDROCHLORIDE 12.5 MG: 25 INJECTION INTRAMUSCULAR; INTRAVENOUS at 08:06

## 2024-06-24 RX ADMIN — AZITHROMYCIN MONOHYDRATE 500 MG: 500 INJECTION, POWDER, LYOPHILIZED, FOR SOLUTION INTRAVENOUS at 02:06

## 2024-06-24 RX ADMIN — SODIUM CHLORIDE 1000 ML: 9 INJECTION, SOLUTION INTRAVENOUS at 02:06

## 2024-06-24 RX ADMIN — ACETAMINOPHEN 650 MG: 325 TABLET ORAL at 02:06

## 2024-06-24 RX ADMIN — SODIUM CHLORIDE 1000 ML: 9 INJECTION, SOLUTION INTRAVENOUS at 03:06

## 2024-06-24 RX ADMIN — MAGNESIUM OXIDE TAB 400 MG (241.3 MG ELEMENTAL MG) 800 MG: 400 (241.3 MG) TAB at 11:06

## 2024-06-24 RX ADMIN — NOREPINEPHRINE BITARTRATE 0.05 MCG/KG/MIN: 4 INJECTION, SOLUTION INTRAVENOUS at 05:06

## 2024-06-24 RX ADMIN — CEFTRIAXONE 1 G: 1 INJECTION, POWDER, FOR SOLUTION INTRAMUSCULAR; INTRAVENOUS at 01:06

## 2024-06-24 RX ADMIN — POTASSIUM CHLORIDE 10 MEQ: 7.46 INJECTION, SOLUTION INTRAVENOUS at 03:06

## 2024-06-24 RX ADMIN — ONDANSETRON 8 MG: 2 INJECTION INTRAMUSCULAR; INTRAVENOUS at 10:06

## 2024-06-24 RX ADMIN — POTASSIUM CHLORIDE 10 MEQ: 7.46 INJECTION, SOLUTION INTRAVENOUS at 02:06

## 2024-06-24 RX ADMIN — ONDANSETRON 4 MG: 2 INJECTION INTRAMUSCULAR; INTRAVENOUS at 12:06

## 2024-06-24 RX ADMIN — MAGNESIUM SULFATE HEPTAHYDRATE 2 G: 40 INJECTION, SOLUTION INTRAVENOUS at 05:06

## 2024-06-24 RX ADMIN — POTASSIUM BICARBONATE 35 MEQ: 391 TABLET, EFFERVESCENT ORAL at 11:06

## 2024-06-24 RX ADMIN — SODIUM CHLORIDE 1710 ML: 9 INJECTION, SOLUTION INTRAVENOUS at 12:06

## 2024-06-24 NOTE — ED PROVIDER NOTES
Encounter Date: 6/24/2024       History     Chief Complaint   Patient presents with    Hypotension     Pt sent to er from clinic. Cough, vomiting and diarrhea began least night. Chills, SOB. Clinic staff had difficulty obtaining bp on pt.     The history is provided by the patient.   Cough  This is a recurrent problem. The current episode started yesterday. The problem occurs constantly. The problem has been unchanged. The cough is Productive of sputum. The maximum temperature recorded prior to her arrival was 101 - 101.9 F. Associated symptoms include chills and shortness of breath. Pertinent negatives include no chest pain, no headaches and no sore throat.     Review of patient's allergies indicates:   Allergen Reactions    Amitriptyline     Amoxicillin-pot clavulanate Other (See Comments)    Sulfa (sulfonamide antibiotics)      Past Medical History:   Diagnosis Date    Chronic headaches     Depression     Diverticulitis     GERD (gastroesophageal reflux disease)     Hypercholesteremia     Hypertension     Migraine headache      Past Surgical History:   Procedure Laterality Date    ABDOMINAL SURGERY      APPENDECTOMY      CATARACT EXTRACTION      EYE SURGERY      HYSTERECTOMY      TONSILLECTOMY       No family history on file.  Social History     Tobacco Use    Smoking status: Former    Smokeless tobacco: Never   Substance Use Topics    Alcohol use: No    Drug use: No     Review of Systems   Constitutional:  Positive for chills. Negative for fever.   HENT: Negative.  Negative for congestion and sore throat.    Eyes: Negative.    Respiratory:  Positive for cough and shortness of breath.    Cardiovascular:  Negative for chest pain.   Gastrointestinal:  Negative for abdominal pain, nausea and vomiting.   Genitourinary:  Negative for dysuria.   Neurological:  Negative for weakness, numbness and headaches.   Psychiatric/Behavioral:  Negative for confusion.        Physical Exam     Initial Vitals   BP Pulse Resp Temp  SpO2   06/24/24 1154 06/24/24 1153 06/24/24 1155 06/24/24 1154 06/24/24 1154   (!) 88/51 98 18 98.3 °F (36.8 °C) (!) 92 %      MAP       --                Physical Exam    Constitutional: She appears well-developed and well-nourished. No distress.   HENT:   Head: Normocephalic and atraumatic.   Eyes: Conjunctivae are normal. Pupils are equal, round, and reactive to light.   Neck: Neck supple.   Normal range of motion.  Cardiovascular:  Normal rate, regular rhythm and normal heart sounds.           Pulmonary/Chest: Breath sounds normal.   Abdominal: Abdomen is soft. Bowel sounds are normal. She exhibits no distension. There is no abdominal tenderness. There is no rebound.   Musculoskeletal:         General: No edema. Normal range of motion.      Cervical back: Normal range of motion and neck supple.     Neurological: She is alert and oriented to person, place, and time. She has normal strength.   Skin: Skin is warm and dry.   Psychiatric: She has a normal mood and affect.         ED Course   Critical Care    Date/Time: 6/24/2024 2:31 PM    Performed by: James Coreas MD  Authorized by: James Coreas MD  Direct patient critical care time: 25 minutes  Additional history critical care time: 15 minutes  Ordering / reviewing critical care time: 12 minutes  Documentation critical care time: 10 minutes  Consulting other physicians critical care time: 5 minutes  Total critical care time (exclusive of procedural time) : 67 minutes  Critical care was necessary to treat or prevent imminent or life-threatening deterioration of the following conditions: circulatory failure.      Central Line    Date/Time: 6/24/2024 4:26 PM    Performed by: James Coreas MD  Authorized by: James Coreas MD    Location procedure was performed:  [facility] EMERGENCY DEPARTMENT  Indications:  Med administration  Anesthesia:  Local infiltration  Local anesthetic:  Lidocaine 1% without epinephrine  Preparation:  Skin prepped with  ChloraPrep  Skin prep agent dried: Skin prep agent completely dried prior to procedure    Sterile barriers: All five maximal sterile barriers used - gloves, gown, cap, mask and large sterile sheet    Hand hygiene: Hand hygiene performed immediately prior to central venous catheter insertion    Location:  Right internal jugular  Catheter type:  Triple lumen  Catheter size:  7 Fr  Ultrasound guidance: Yes    Vessel Caliber:  Medium  Comprressibility:  Normal  Steril sheath on probe.    Sterile gel used.  Manometry: No    Number of attempts:  2  Securement:  Line sutured, chlorhexidine patch, blood return through all ports and sterile dressing applied  XRay:  Placement verified by x-ray  Adverse Events:  NoneTermination Site: superior vena cava    Labs Reviewed   CBC W/ AUTO DIFFERENTIAL - Abnormal; Notable for the following components:       Result Value    RBC 3.98 (*)     Hemoglobin 11.4 (*)     Hematocrit 34.8 (*)     Lymph % 11.0 (*)     All other components within normal limits   COMPREHENSIVE METABOLIC PANEL - Abnormal; Notable for the following components:    Potassium 2.8 (*)     eGFR 43.1 (*)     All other components within normal limits   LACTIC ACID, PLASMA - Abnormal; Notable for the following components:    Lactate (Lactic Acid) 2.7 (*)     All other components within normal limits   LACTIC ACID, PLASMA - Abnormal; Notable for the following components:    Lactate (Lactic Acid) 2.7 (*)     All other components within normal limits   TROPONIN I - Abnormal; Notable for the following components:    Troponin I 0.042 (*)     All other components within normal limits   B-TYPE NATRIURETIC PEPTIDE - Abnormal; Notable for the following components:     (*)     All other components within normal limits   MAGNESIUM - Abnormal; Notable for the following components:    Magnesium 1.2 (*)     All other components within normal limits   CULTURE, BLOOD   CULTURE, BLOOD   URINALYSIS, REFLEX TO URINE CULTURE     Narrative:     Specimen Source->Urine   TROPONIN I   PROCALCITONIN   LACTIC ACID, PLASMA     EKG Readings: (Independently Interpreted)   Rhythm: Normal Sinus Rhythm. Heart Rate: 98. Ectopy: No Ectopy. Conduction: Normal. ST Segments: Normal ST Segments. T Waves: Normal. Clinical Impression: Normal Sinus Rhythm     ECG Results              EKG 12-lead (In process)        Collection Time Result Time QRS Duration OHS QTC Calculation    06/24/24 11:53:08 06/24/24 13:14:05 76 518                     In process by Interface, Lab In [facility] (06/24/24 13:14:16)                   Narrative:    Test Reason : R06.02,    Vent. Rate : 098 BPM     Atrial Rate : 098 BPM     P-R Int : 152 ms          QRS Dur : 076 ms      QT Int : 406 ms       P-R-T Axes : 009 021 021 degrees     QTc Int : 518 ms    Normal sinus rhythm  Septal infarct ,age undetermined  Prolonged QT  Abnormal ECG  When compared with ECG of 16-FEB-2024 21:29,  Septal infarct is now Present    Referred By: [email or web address]   SELF           Confirmed By:                                   Imaging Results              X-Ray Chest AP Portable (Final result)  Result time 06/24/24 12:32:29      Final result by Andrea Rainey MD (06/24/24 12:32:29)                   Impression:      As above.      Electronically signed by: Andrea Rainey  Date:    06/24/2024  Time:    12:32               Narrative:    EXAMINATION:  XR CHEST AP PORTABLE    CLINICAL HISTORY:  Sepsis;    TECHNIQUE:  Single frontal view of the chest was performed.    COMPARISON:  Multiple priors.    FINDINGS:  Left basilar opacity possibly infection or atelectasis.  Otherwise the lungs are clear.  Heart is normal size.                                       Medications   sodium chloride 0.9% bolus 1,000 mL 1,000 mL (1,000 mLs Intravenous New Bag 6/24/24 1544)   sodium chloride 0.9% bolus 1,710 mL 1,710 mL (0 mLs Intravenous Stopped 6/24/24 1307)   ondansetron injection 4 mg (4 mg Intravenous Given 6/24/24 1250)    acetaminophen tablet 650 mg (650 mg Oral Given 6/24/24 1406)   cefTRIAXone (Rocephin) 1 g in D5W 100 mL IVPB (MB+) (0 g Intravenous Stopped 6/24/24 1428)   azithromycin (ZITHROMAX) 500 mg in D5W 250 mL IVPB (Vial-Mate) (0 mg Intravenous Stopped 6/24/24 1535)   potassium chloride 10 mEq in 100 mL IVPB (0 mEq Intravenous Stopped 6/24/24 1516)   promethazine (PHENERGAN) 25 mg in 0.9% NaCl 50 mL IVPB (0 mg Intravenous Stopped 6/24/24 1410)   sodium chloride 0.9% bolus 1,000 mL 1,000 mL (0 mLs Intravenous Stopped 6/24/24 1508)   0.9%  NaCl infusion (1,000 mLs Intravenous New Bag 6/24/24 1535)   potassium chloride 10 mEq in 100 mL IVPB (10 mEq Intravenous New Bag 6/24/24 1536)     Medical Decision Making  DDx: hypotensions, sepsis, pneumonia    Amount and/or Complexity of Data Reviewed  Labs: ordered.  Radiology: ordered.  Discussion of management or test interpretation with external provider(s): Discussed case with Ellie Quigley () Dr. Fortune will admit.      BP dropped.  Central line placed, patient will now be admitted to the ICU.        Risk  OTC drugs.  Prescription drug management.  Decision regarding hospitalization.                                      Clinical Impression:  Final diagnoses:  [R06.02] SOB (shortness of breath)  [I95.9] Hypotension, unspecified hypotension type (Primary)  [E87.20] Lactic acidosis  [Z45.2] Encounter for central line placement  [A41.9, R65.21] Septic shock          ED Disposition Condition    Admit Stable                James Coreas MD  06/24/24 1431       James Coreas MD  06/24/24 1628

## 2024-06-24 NOTE — PROGRESS NOTES
Subjective:       Patient ID: Gaby Stanley is a 74 y.o. female.    Chief Complaint: Fever (chills fever started last night states she had pneumo in feb), Chills, and Nausea    Presents to clinic for fever, chills, headache, cough, nausea, vomiting, and diarrhea started last night. PCP Dr. Sharma. Hx pneumonia RUL in 2/2024 treated with azithro and Rocephin, then levaquin. Established with pulmonology for interstitial lung diasease. Last seen 5/27/2024. Treated for bronchitis at  on 5/24/24 with Levaquin x 10 days. Took ibuprofen last night      Patient Active Problem List   Diagnosis    Anxiety    Migraine    Hyperlipidemia LDL goal <130    Hypertension    Pernicious anemia    GERD (gastroesophageal reflux disease)    Cough    Abnormal CT of the chest    Interstitial lung disease       No family history on file.  Past Surgical History:   Procedure Laterality Date    ABDOMINAL SURGERY      APPENDECTOMY      CATARACT EXTRACTION      EYE SURGERY      HYSTERECTOMY      TONSILLECTOMY           Current Outpatient Medications:     alendronate (FOSAMAX) 70 MG tablet, Take 70 mg by mouth every 7 days., Disp: , Rfl:     amLODIPine (NORVASC) 10 MG tablet, Take 1 tablet by mouth every morning., Disp: , Rfl:     azelastine (ASTELIN) 137 mcg (0.1 %) nasal spray, 1 spray (137 mcg total) by Nasal route 2 (two) times daily., Disp: 30 mL, Rfl: 3    cyanocobalamin 1,000 mcg/mL injection, Inject 1,000 mcg into the muscle., Disp: , Rfl:     iron-vit c-b12-folic acid (ICAR-C PLUS) Tab, Take 1 tablet by mouth every morning., Disp: , Rfl:     levoFLOXacin (LEVAQUIN) 750 MG tablet, Take 1 tablet (750 mg total) by mouth once daily., Disp: 7 tablet, Rfl: 0    lipase-protease-amylase (ZENPEP) 25,000-85,000- 136,000 unit CpDR, Take by mouth before meals and at bedtime as needed., Disp: , Rfl:     ondansetron (ZOFRAN) 4 MG tablet, Take 4 mg by mouth every 8 (eight) hours as needed., Disp: , Rfl:     ondansetron (ZOFRAN-ODT) 4 MG TbDL, Take  "1 tablet (4 mg total) by mouth every 6 (six) hours as needed (Nausea)., Disp: 20 tablet, Rfl: 0    pantoprazole (PROTONIX) 40 MG tablet, Take 40 mg by mouth 2 (two) times daily. , Disp: , Rfl:     predniSONE (DELTASONE) 10 MG tablet, Take 1 tablet (10 mg total) by mouth once daily., Disp: 14 tablet, Rfl: 0    promethazine (PHENERGAN) 25 MG tablet, Take 1 tablet (25 mg total) by mouth every 6 (six) hours as needed for Nausea., Disp: 30 tablet, Rfl: 0    ROSUVASTATIN CALCIUM (CRESTOR ORAL), Take by mouth., Disp: , Rfl:     sertraline (ZOLOFT) 100 MG tablet, Take 100 mg by mouth once daily., Disp: , Rfl:     SUMATRIPTAN SUCCINATE (IMITREX ORAL), Take by mouth as needed. , Disp: , Rfl:     traZODone (DESYREL) 100 MG tablet, Take 2 tablets by mouth every evening., Disp: , Rfl:     loratadine (CLARITIN) 10 mg tablet, Take 10 mg by mouth once daily at 6am., Disp: , Rfl:     Review of Systems   Constitutional:  Positive for activity change, appetite change, chills, fatigue and fever.   Respiratory:  Positive for cough and shortness of breath.    Cardiovascular:  Negative for chest pain.   Gastrointestinal:  Positive for diarrhea, nausea and vomiting.   Skin:  Negative for rash.   Neurological:  Positive for dizziness, weakness and light-headedness.       Objective:   BP (!) 80/50 (BP Location: Left arm, Patient Position: Sitting, BP Method: Medium (Manual))   Pulse 100   Temp (!) 100.4 °F (38 °C)   Resp 18   Ht 5' 5" (1.651 m)   Wt 64.4 kg (141 lb 15.6 oz)   SpO2 (!) 89%   BMI 23.63 kg/m²      Physical Exam  Vitals reviewed.   Constitutional:       Appearance: She is ill-appearing.   HENT:      Head: Normocephalic.   Eyes:      Conjunctiva/sclera: Conjunctivae normal.   Cardiovascular:      Rate and Rhythm: Normal rate and regular rhythm.      Heart sounds: Normal heart sounds. No murmur heard.  Pulmonary:      Effort: Respiratory distress present.      Breath sounds: No wheezing, rhonchi or rales.      Comments: " "Labored breathing, winded with short sentences,   BS diminished throughout  Skin:     Coloration: Skin is pale.   Neurological:      Mental Status: She is oriented to person, place, and time.   Psychiatric:         Behavior: Behavior normal.         Assessment & Plan     1. Fever, unspecified fever cause  -     POCT COVID-19 Rapid Screening  -     POCT Influenza A/B Molecular    2. Shortness of breath  Comments:  Negative influenza and covid. Labored breathing on exam, 89% on RA. Sent to ED for acute evaluation. Report given to DONA Philippe-SHERINE      Portions of this note may have been created with voice recognition software. Occasional "wrong-word" or "sound-a-like" substitutions may have occurred due to the inherent limitations of voice recognition software. Please, read the note carefully and recognize, using context, where substitutions have occurred.     "

## 2024-06-24 NOTE — Clinical Note
Diagnosis: Septic shock [121033]   Future Attending Provider: JOSE SHERMAN [[id number]]   Reason for IP Medical Treatment  (Clinical interventions that can only be accomplished in the IP setting? ) :: sob, hypotension, lactic acidosis   I certify that Inpatient services for greater than or equal to 2 midnights are medically necessary:: Yes   Plans for Post-Acute care--if anticipated (pick the single best option):: A. No post acute care anticipated at this time

## 2024-06-25 PROBLEM — R19.7 DIARRHEA: Status: ACTIVE | Noted: 2024-06-25

## 2024-06-25 LAB
ALBUMIN SERPL BCP-MCNC: 2.7 G/DL (ref 3.5–5.2)
ALP SERPL-CCNC: 79 U/L (ref 55–135)
ALT SERPL W/O P-5'-P-CCNC: 23 U/L (ref 10–44)
ANION GAP SERPL CALC-SCNC: 7 MMOL/L (ref 8–16)
AST SERPL-CCNC: 38 U/L (ref 10–40)
BASOPHILS # BLD AUTO: 0.05 K/UL (ref 0–0.2)
BASOPHILS NFR BLD: 0.4 % (ref 0–1.9)
BILIRUB DIRECT SERPL-MCNC: 0.2 MG/DL (ref 0.1–0.3)
BILIRUB SERPL-MCNC: 0.3 MG/DL (ref 0.1–1)
BUN SERPL-MCNC: 12 MG/DL (ref 8–23)
C DIFF GDH STL QL: POSITIVE
C DIFF TOX A+B STL QL IA: NEGATIVE
CALCIUM SERPL-MCNC: 8.2 MG/DL (ref 8.7–10.5)
CHLORIDE SERPL-SCNC: 113 MMOL/L (ref 95–110)
CO2 SERPL-SCNC: 22 MMOL/L (ref 23–29)
CORTIS SERPL-MCNC: 20.4 UG/DL
CORTIS SERPL-MCNC: 9.3 UG/DL
CREAT SERPL-MCNC: 0.7 MG/DL (ref 0.5–1.4)
CRP SERPL-MCNC: 263.4 MG/L (ref 0–8.2)
DIFFERENTIAL METHOD BLD: ABNORMAL
EOSINOPHIL # BLD AUTO: 0 K/UL (ref 0–0.5)
EOSINOPHIL NFR BLD: 0.2 % (ref 0–8)
ERYTHROCYTE [DISTWIDTH] IN BLOOD BY AUTOMATED COUNT: 14.9 % (ref 11.5–14.5)
ERYTHROCYTE [SEDIMENTATION RATE] IN BLOOD BY WESTERGREN METHOD: 35 MM/HR (ref 0–20)
EST. GFR  (NO RACE VARIABLE): >60 ML/MIN/1.73 M^2
GLUCOSE SERPL-MCNC: 77 MG/DL (ref 70–110)
HCT VFR BLD AUTO: 26.6 % (ref 37–48.5)
HGB BLD-MCNC: 8.6 G/DL (ref 12–16)
IMM GRANULOCYTES # BLD AUTO: 0.08 K/UL (ref 0–0.04)
IMM GRANULOCYTES NFR BLD AUTO: 0.7 % (ref 0–0.5)
LDH SERPL L TO P-CCNC: 155 U/L (ref 110–260)
LYMPHOCYTES # BLD AUTO: 1 K/UL (ref 1–4.8)
LYMPHOCYTES NFR BLD: 8.7 % (ref 18–48)
MAGNESIUM SERPL-MCNC: 1.9 MG/DL (ref 1.6–2.6)
MCH RBC QN AUTO: 27.8 PG (ref 27–31)
MCHC RBC AUTO-ENTMCNC: 32.3 G/DL (ref 32–36)
MCV RBC AUTO: 86 FL (ref 82–98)
MONOCYTES # BLD AUTO: 0.7 K/UL (ref 0.3–1)
MONOCYTES NFR BLD: 6 % (ref 4–15)
NEUTROPHILS # BLD AUTO: 9.9 K/UL (ref 1.8–7.7)
NEUTROPHILS NFR BLD: 84 % (ref 38–73)
NRBC BLD-RTO: 0 /100 WBC
OHS QRS DURATION: 76 MS
OHS QTC CALCULATION: 518 MS
PHOSPHATE SERPL-MCNC: 2.4 MG/DL (ref 2.7–4.5)
PLATELET # BLD AUTO: 142 K/UL (ref 150–450)
PLATELET BLD QL SMEAR: ABNORMAL
PMV BLD AUTO: 10.4 FL (ref 9.2–12.9)
POTASSIUM SERPL-SCNC: 3.6 MMOL/L (ref 3.5–5.1)
PROCALCITONIN SERPL IA-MCNC: 29.03 NG/ML
PROT SERPL-MCNC: 5.2 G/DL (ref 6–8.4)
RBC # BLD AUTO: 3.09 M/UL (ref 4–5.4)
RHEUMATOID FACT SERPL-ACNC: <13 IU/ML (ref 0–15)
SODIUM SERPL-SCNC: 142 MMOL/L (ref 136–145)
TROPONIN I SERPL DL<=0.01 NG/ML-MCNC: 0.06 NG/ML (ref 0–0.03)
TROPONIN I SERPL DL<=0.01 NG/ML-MCNC: 0.22 NG/ML (ref 0–0.03)
WBC # BLD AUTO: 11.74 K/UL (ref 3.9–12.7)

## 2024-06-25 PROCEDURE — 87205 SMEAR GRAM STAIN: CPT | Performed by: NURSE PRACTITIONER

## 2024-06-25 PROCEDURE — 94761 N-INVAS EAR/PLS OXIMETRY MLT: CPT

## 2024-06-25 PROCEDURE — 25000003 PHARM REV CODE 250: Performed by: SPECIALIST

## 2024-06-25 PROCEDURE — 97530 THERAPEUTIC ACTIVITIES: CPT

## 2024-06-25 PROCEDURE — 99900035 HC TECH TIME PER 15 MIN (STAT)

## 2024-06-25 PROCEDURE — 87070 CULTURE OTHR SPECIMN AEROBIC: CPT | Performed by: NURSE PRACTITIONER

## 2024-06-25 PROCEDURE — 51798 US URINE CAPACITY MEASURE: CPT

## 2024-06-25 PROCEDURE — 11000001 HC ACUTE MED/SURG PRIVATE ROOM

## 2024-06-25 PROCEDURE — 84100 ASSAY OF PHOSPHORUS: CPT | Performed by: NURSE PRACTITIONER

## 2024-06-25 PROCEDURE — 25000003 PHARM REV CODE 250: Performed by: HOSPITALIST

## 2024-06-25 PROCEDURE — 82533 TOTAL CORTISOL: CPT | Performed by: NURSE PRACTITIONER

## 2024-06-25 PROCEDURE — 97162 PT EVAL MOD COMPLEX 30 MIN: CPT

## 2024-06-25 PROCEDURE — 87449 NOS EACH ORGANISM AG IA: CPT | Mod: 91 | Performed by: SPECIALIST

## 2024-06-25 PROCEDURE — 86635 COCCIDIOIDES ANTIBODY: CPT | Performed by: NURSE PRACTITIONER

## 2024-06-25 PROCEDURE — 86140 C-REACTIVE PROTEIN: CPT | Performed by: NURSE PRACTITIONER

## 2024-06-25 PROCEDURE — 87449 NOS EACH ORGANISM AG IA: CPT | Performed by: NURSE PRACTITIONER

## 2024-06-25 PROCEDURE — 80048 BASIC METABOLIC PNL TOTAL CA: CPT | Performed by: NURSE PRACTITIONER

## 2024-06-25 PROCEDURE — 86036 ANCA SCREEN EACH ANTIBODY: CPT | Mod: 59 | Performed by: NURSE PRACTITIONER

## 2024-06-25 PROCEDURE — 27000207 HC ISOLATION

## 2024-06-25 PROCEDURE — 83615 LACTATE (LD) (LDH) ENZYME: CPT | Performed by: NURSE PRACTITIONER

## 2024-06-25 PROCEDURE — 97166 OT EVAL MOD COMPLEX 45 MIN: CPT

## 2024-06-25 PROCEDURE — 63600175 PHARM REV CODE 636 W HCPCS: Performed by: SPECIALIST

## 2024-06-25 PROCEDURE — 84145 PROCALCITONIN (PCT): CPT | Performed by: NURSE PRACTITIONER

## 2024-06-25 PROCEDURE — 86038 ANTINUCLEAR ANTIBODIES: CPT | Performed by: NURSE PRACTITIONER

## 2024-06-25 PROCEDURE — 84484 ASSAY OF TROPONIN QUANT: CPT | Performed by: NURSE PRACTITIONER

## 2024-06-25 PROCEDURE — 27000221 HC OXYGEN, UP TO 24 HOURS

## 2024-06-25 PROCEDURE — 63600175 PHARM REV CODE 636 W HCPCS: Performed by: NURSE PRACTITIONER

## 2024-06-25 PROCEDURE — 25000003 PHARM REV CODE 250: Performed by: NURSE PRACTITIONER

## 2024-06-25 PROCEDURE — 85651 RBC SED RATE NONAUTOMATED: CPT | Performed by: NURSE PRACTITIONER

## 2024-06-25 PROCEDURE — 85025 COMPLETE CBC W/AUTO DIFF WBC: CPT | Performed by: NURSE PRACTITIONER

## 2024-06-25 PROCEDURE — 86698 HISTOPLASMA ANTIBODY: CPT | Performed by: NURSE PRACTITIONER

## 2024-06-25 PROCEDURE — 80076 HEPATIC FUNCTION PANEL: CPT | Performed by: NURSE PRACTITIONER

## 2024-06-25 PROCEDURE — 36415 COLL VENOUS BLD VENIPUNCTURE: CPT | Performed by: NURSE PRACTITIONER

## 2024-06-25 PROCEDURE — 86431 RHEUMATOID FACTOR QUANT: CPT | Performed by: NURSE PRACTITIONER

## 2024-06-25 PROCEDURE — 87493 C DIFF AMPLIFIED PROBE: CPT | Performed by: NURSE PRACTITIONER

## 2024-06-25 PROCEDURE — 25000242 PHARM REV CODE 250 ALT 637 W/ HCPCS: Performed by: NURSE PRACTITIONER

## 2024-06-25 PROCEDURE — 83735 ASSAY OF MAGNESIUM: CPT | Performed by: NURSE PRACTITIONER

## 2024-06-25 PROCEDURE — 87324 CLOSTRIDIUM AG IA: CPT | Performed by: NURSE PRACTITIONER

## 2024-06-25 RX ORDER — METRONIDAZOLE 500 MG/100ML
500 INJECTION, SOLUTION INTRAVENOUS
Status: DISCONTINUED | OUTPATIENT
Start: 2024-06-25 | End: 2024-06-25

## 2024-06-25 RX ORDER — PREDNISONE 20 MG/1
20 TABLET ORAL DAILY
Status: DISCONTINUED | OUTPATIENT
Start: 2024-06-25 | End: 2024-06-25

## 2024-06-25 RX ORDER — OXYCODONE AND ACETAMINOPHEN 7.5; 325 MG/1; MG/1
1 TABLET ORAL EVERY 6 HOURS PRN
Status: DISCONTINUED | OUTPATIENT
Start: 2024-06-25 | End: 2024-06-28 | Stop reason: HOSPADM

## 2024-06-25 RX ORDER — POTASSIUM CHLORIDE 29.8 MG/ML
40 INJECTION INTRAVENOUS
Status: DISCONTINUED | OUTPATIENT
Start: 2024-06-25 | End: 2024-06-26

## 2024-06-25 RX ORDER — PREDNISONE 20 MG/1
40 TABLET ORAL DAILY
Status: DISCONTINUED | OUTPATIENT
Start: 2024-06-26 | End: 2024-06-28 | Stop reason: HOSPADM

## 2024-06-25 RX ORDER — MAGNESIUM SULFATE HEPTAHYDRATE 40 MG/ML
2 INJECTION, SOLUTION INTRAVENOUS
Status: DISCONTINUED | OUTPATIENT
Start: 2024-06-25 | End: 2024-06-26

## 2024-06-25 RX ORDER — ARFORMOTEROL TARTRATE 15 UG/2ML
15 SOLUTION RESPIRATORY (INHALATION) 2 TIMES DAILY
Status: DISCONTINUED | OUTPATIENT
Start: 2024-06-25 | End: 2024-06-28 | Stop reason: HOSPADM

## 2024-06-25 RX ORDER — BUDESONIDE 0.5 MG/2ML
0.5 INHALANT ORAL EVERY 12 HOURS
Status: DISCONTINUED | OUTPATIENT
Start: 2024-06-25 | End: 2024-06-28 | Stop reason: HOSPADM

## 2024-06-25 RX ORDER — PANTOPRAZOLE SODIUM 40 MG/1
40 TABLET, DELAYED RELEASE ORAL 2 TIMES DAILY
Status: DISCONTINUED | OUTPATIENT
Start: 2024-06-25 | End: 2024-06-28 | Stop reason: HOSPADM

## 2024-06-25 RX ORDER — AMLODIPINE BESYLATE 10 MG/1
10 TABLET ORAL EVERY MORNING
Status: DISCONTINUED | OUTPATIENT
Start: 2024-06-25 | End: 2024-06-28 | Stop reason: HOSPADM

## 2024-06-25 RX ORDER — PROMETHAZINE HYDROCHLORIDE 12.5 MG/1
12.5 TABLET ORAL EVERY 6 HOURS PRN
Status: DISCONTINUED | OUTPATIENT
Start: 2024-06-25 | End: 2024-06-28 | Stop reason: HOSPADM

## 2024-06-25 RX ORDER — POTASSIUM CHLORIDE 29.8 MG/ML
80 INJECTION INTRAVENOUS
Status: DISCONTINUED | OUTPATIENT
Start: 2024-06-25 | End: 2024-06-26

## 2024-06-25 RX ORDER — TRAZODONE HYDROCHLORIDE 50 MG/1
50 TABLET ORAL ONCE
Status: COMPLETED | OUTPATIENT
Start: 2024-06-25 | End: 2024-06-25

## 2024-06-25 RX ORDER — MAGNESIUM SULFATE HEPTAHYDRATE 40 MG/ML
4 INJECTION, SOLUTION INTRAVENOUS
Status: DISCONTINUED | OUTPATIENT
Start: 2024-06-25 | End: 2024-06-26

## 2024-06-25 RX ORDER — POTASSIUM CHLORIDE 14.9 MG/ML
60 INJECTION INTRAVENOUS
Status: DISCONTINUED | OUTPATIENT
Start: 2024-06-25 | End: 2024-06-26

## 2024-06-25 RX ORDER — VANCOMYCIN HYDROCHLORIDE 25 MG/ML
125 KIT ORAL EVERY 6 HOURS
Status: DISCONTINUED | OUTPATIENT
Start: 2024-06-25 | End: 2024-06-28 | Stop reason: HOSPADM

## 2024-06-25 RX ADMIN — MUPIROCIN: 20 OINTMENT TOPICAL at 08:06

## 2024-06-25 RX ADMIN — PREDNISONE 20 MG: 20 TABLET ORAL at 08:06

## 2024-06-25 RX ADMIN — CEFTRIAXONE 1 G: 1 INJECTION, POWDER, FOR SOLUTION INTRAMUSCULAR; INTRAVENOUS at 12:06

## 2024-06-25 RX ADMIN — ENOXAPARIN SODIUM 40 MG: 40 INJECTION SUBCUTANEOUS at 04:06

## 2024-06-25 RX ADMIN — BUTALBITAL, ACETAMINOPHEN, AND CAFFEINE 1 TABLET: 325; 50; 40 TABLET ORAL at 04:06

## 2024-06-25 RX ADMIN — BUDESONIDE INHALATION 0.5 MG: 0.5 SUSPENSION RESPIRATORY (INHALATION) at 07:06

## 2024-06-25 RX ADMIN — ONDANSETRON 8 MG: 2 INJECTION INTRAMUSCULAR; INTRAVENOUS at 05:06

## 2024-06-25 RX ADMIN — OXYCODONE AND ACETAMINOPHEN 1 TABLET: 7.5; 325 TABLET ORAL at 08:06

## 2024-06-25 RX ADMIN — ARFORMOTEROL TARTRATE 15 MCG: 15 SOLUTION RESPIRATORY (INHALATION) at 07:06

## 2024-06-25 RX ADMIN — METRONIDAZOLE 500 MG: 500 INJECTION, SOLUTION INTRAVENOUS at 01:06

## 2024-06-25 RX ADMIN — BUTALBITAL, ACETAMINOPHEN, AND CAFFEINE 1 TABLET: 325; 50; 40 TABLET ORAL at 06:06

## 2024-06-25 RX ADMIN — VANCOMYCIN HYDROCHLORIDE 125 MG: KIT at 04:06

## 2024-06-25 RX ADMIN — POTASSIUM CHLORIDE 40 MEQ: 29.8 INJECTION, SOLUTION INTRAVENOUS at 08:06

## 2024-06-25 RX ADMIN — METHYLPREDNISOLONE SODIUM SUCCINATE 60 MG: 40 INJECTION, POWDER, FOR SOLUTION INTRAMUSCULAR; INTRAVENOUS at 11:06

## 2024-06-25 RX ADMIN — TRAZODONE HYDROCHLORIDE 50 MG: 50 TABLET ORAL at 11:06

## 2024-06-25 RX ADMIN — SERTRALINE HYDROCHLORIDE 100 MG: 50 TABLET ORAL at 08:06

## 2024-06-25 RX ADMIN — ACETAMINOPHEN 650 MG: 325 TABLET ORAL at 04:06

## 2024-06-25 RX ADMIN — PROMETHAZINE HYDROCHLORIDE 12.5 MG: 12.5 TABLET ORAL at 01:06

## 2024-06-25 RX ADMIN — PANTOPRAZOLE SODIUM 40 MG: 40 TABLET, DELAYED RELEASE ORAL at 08:06

## 2024-06-25 RX ADMIN — VANCOMYCIN HYDROCHLORIDE 1750 MG: 500 INJECTION, POWDER, LYOPHILIZED, FOR SOLUTION INTRAVENOUS at 04:06

## 2024-06-25 RX ADMIN — AMLODIPINE BESYLATE 10 MG: 10 TABLET ORAL at 02:06

## 2024-06-25 RX ADMIN — PROMETHAZINE HYDROCHLORIDE 12.5 MG: 12.5 TABLET ORAL at 08:06

## 2024-06-25 RX ADMIN — OXYCODONE AND ACETAMINOPHEN 1 TABLET: 7.5; 325 TABLET ORAL at 12:06

## 2024-06-25 NOTE — ASSESSMENT & PLAN NOTE
This patient has shock. The type of shock is distributive due to sepsis.   The patient has the following evidence of shock: persistent hypotension.   The patient will be admitted to an intensive care unit, they will be treated with IV abx, steroid, pressor support to keep MAP > 65, and ICU hemodynamic monitoring

## 2024-06-25 NOTE — PT/OT/SLP EVAL
"Physical Therapy Evaluation    Patient Name:  Gaby Stanley   MRN:  [id number]    Recommendations:     Discharge Recommendations: Low Intensity Therapy   Discharge Equipment Recommendations: none   Barriers to discharge: None    Assessment:     Gaby Stanley is a 74 y.o. female admitted with a medical diagnosis of Septic shock.  She presents with the following impairments/functional limitations: weakness, impaired endurance, gait instability, impaired functional mobility, impaired balance, decreased coordination, decreased safety awareness which limits mobility and increases risk of falls. Pt with continued reports of weakness and fatigue but able to participate in functional tasks with min A. Pt will benefit from continued PT services in order to progress toward baseline. Recommend low intensity PT services.    Rehab Prognosis: Good; patient would benefit from acute skilled PT services to address these deficits and reach maximum level of function.    Recent Surgery: * No surgery found *      Plan:     During this hospitalization, patient to be seen 3 x/week to address the identified rehab impairments via gait training, therapeutic activities, therapeutic exercises, neuromuscular re-education and progress toward the following goals:    Plan of Care Expires:  07/09/24    Subjective     Chief Complaint: fatigue  Patient/Family Comments/goals: to get better  Pain/Comfort:  Pain Rating 1: 0/10  Pain Rating Post-Intervention 1: 0/10    Patients cultural, spiritual, Yazidi conflicts given the current situation: no    Living Environment:  Pt lives with spouse "Jose" in [place] with no steps at entry  Prior to admission, patients level of function was independent with ADLs, ambulatory in home and community. Pt no longer drives.  Equipment used at home: walker, rolling, bedside commode, shower chair, cane, straight.  DME owned (not currently used): none.  Upon discharge, patient will have assistance from " family.    Objective:     Communicated with nursing (Sheri) and performed chart review via epic system prior to session.  Patient found supine with peripheral IV, telemetry, pulse ox (continuous), oxygen, central line  upon PT entry to room.    General Precautions: Standard, fall  Orthopedic Precautions:N/A   Braces: N/A  Respiratory Status: Nasal cannula    Exams:  Cognitive Exam:  Patient is oriented to Person, Place, Time, and Situation  Gross Motor Coordination:  WFL  Postural Exam:  Patient presented with the following abnormalities:    -       Rounded shoulders  -       Forward head  RLE ROM: WFL  RLE Strength: WFL  LLE ROM: WFL  LLE Strength: WFL    Functional Mobility:  Bed Mobility:     Scooting: minimum assistance  Supine to Sit: minimum assistance  Pt tolerated sitting EOB x 5-7 mins with no LOB  Sit to Supine: minimum assistance  Transfers:     Sit to Stand:  minimum assistance with hand-held assist  Bed to Chair: minimum assistance with  hand-held assist  using  Stand Pivot  Gait: 5 ft min A with HHA, demonstrated slow pace, flexed posture, wide PRICILLA, unsteadiness on feet  Balance: poor plus dynamic standing balance      AM-PAC 6 CLICK MOBILITY  Total Score:16       Treatment & Education:  Educated pt on benefits of consistent participation in PT services to meet functional goals. Educated pt on supine therex to promote strength, circulation and joint mobility. Exercises included AP, heel slides. Educated to perform exercises intermittently throughout day to tolerance. Educated pt on importance of sitting OOB to promote endurance and overall activity tolerance, pt refused today. Educated pt on call don't fall policy and use of call button to alert nursing staff of needs (including to assist in/out of bed). Pt expressed understanding.     Patient left supine with all lines intact, call button in reach, nursing notified, and nursing present.    GOALS:   Multidisciplinary Problems       Physical Therapy  Goals          Problem: Physical Therapy    Goal Priority Disciplines Outcome Goal Variances Interventions   Physical Therapy Goal     PT, PT/OT      Description: Pt will perform bed mobility independently in order to participate in EOB activity.  Pt will perform transfers independently in order to participate in OOB activity.  Pt will ambulate 350 ft mod I with LRAD in order to participate in daily tasks.                        History:     Past Medical History:   Diagnosis Date    Chronic headaches     Depression     Diverticulitis     GERD (gastroesophageal reflux disease)     Hypercholesteremia     Hypertension     ILD (interstitial lung disease)     Migraine headache        Past Surgical History:   Procedure Laterality Date    ABDOMINAL SURGERY      APPENDECTOMY      CATARACT EXTRACTION      EYE SURGERY      HYSTERECTOMY      TONSILLECTOMY         Time Tracking:     PT Received On: 06/25/24  PT Start Time: 1310     PT Stop Time: 1335  PT Total Time (min): 25 min     Billable Minutes: Evaluation 10 and Therapeutic Activity 15      06/25/2024

## 2024-06-25 NOTE — HOSPITAL COURSE
6/25: no new issues or c/o since pt admitted to the ICU, off pressors with stable BP, on 2L NC in NAD,  at bedside this AM - all questions answered  6/26: no new issues, pt reports she did not sleep last night and needs her home trazodone resumed, breathing better/stable, awaiting bed outside of the ICU

## 2024-06-25 NOTE — ASSESSMENT & PLAN NOTE
Vs Macrobid related lung injury  Recently completed steroid therapy outpt  -follow up chest CT  -add steroid therapy

## 2024-06-25 NOTE — PLAN OF CARE
ICU Care Plan  [facility] ([facility])    POC reviewed with Gaby Stanley. Pt verbalized understanding. Questions answered and concerns addressed, emotional support provided.  Pt progressing toward goals. See below and flowsheets for full assessment and VS info.   Temp:  [98.8 °F (37.1 °C)-99.4 °F (37.4 °C)]   Pulse:  [77-96]   Resp:  [19-33]   BP: ()/(55-73)   SpO2:  [87 %-98 %]     Neuro:  [place] Coma Scale  Best Eye Response: 4-->(E4) spontaneous  Best Motor Response: 6-->(M6) obeys commands  Best Verbal Response: 5-->(V5) oriented  [place] Coma Scale Score: 15   Pupil PERRLA: yes  24hr Temp:  [98.3 °F (36.8 °C)-100.4 °F (38 °C)]     CV:   Rhythm: normal sinus rhythm  Levophed stopped when patient was admitted here to the ICU.  MAP maintained greater than 65   BP goals:   MAP > 65     Resp:   Device (Oxygen Therapy): nasal cannula  Flow (L/min) (Oxygen Therapy): 2    GI/:  Diet/Nutrition Received: NPO. C/o nausea PRN zofran given x 2 for shift  Last Bowel Movement: 06/25/24, diarrhea  Voiding Characteristics: voids spontaneously without difficulty  I/O this shift:  In: 1325.8 [P.O.:120; I.V.:659.4; IV Piggyback:546.4]  Out: 650 [Urine:650]    Intake/Output Summary (Last 24 hours) at 6/25/2024 0631  Last data filed at 6/25/2024 0609  Gross per 24 hour   Intake 5635.76 ml   Output 800 ml   Net 4835.76 ml       Lines/Drains/Airways       Central Venous Catheter Line  Duration             Percutaneous Central Line - Triple Lumen  06/24/24 1626 Internal Jugular Right <1 day              Peripheral Intravenous Line  Duration                  Peripheral IV - Single Lumen 06/24/24 1202 18 G Right Antecubital <1 day         Peripheral IV - Single Lumen 06/24/24 1350 22 G Right Hand <1 day                  Labs/Accuchecks:  Recent Labs   Lab 06/25/24  0408   WBC 11.74   RBC 3.09*   HGB 8.6*   HCT 26.6*   *      Recent Labs   Lab 06/25/24  0408      K 3.6   CO2 22*   *   BUN 12  "  CREATININE 0.7   ALKPHOS 79   ALT 23   AST 38   BILITOT 0.3    No results for input(s): "PROTIME", "INR", "APTT", "HEPANTIXA" in the last 168 hours.   Recent Labs   Lab 06/25/24  0408   TROPONINI 0.058*     Electrolytes: Electrolytes replaced  Accuchecks: none  "

## 2024-06-25 NOTE — PT/OT/SLP EVAL
Occupational Therapy   Evaluation & Treatment    Name: Gaby Stanley  MRN: [id number]  Admitting Diagnosis: Septic shock  Recent Surgery: * No surgery found *      Recommendations:     Discharge Recommendations: Low Intensity Therapy  Discharge Equipment Recommendations:  none  Barriers to discharge:  None    Assessment:     Gaby Stanley is a 74 y.o. female with a medical diagnosis of Septic shock.  Performance deficits affecting function: weakness, gait instability, decreased upper extremity function, impaired coordination, decreased lower extremity function, decreased ROM, impaired balance, impaired endurance, decreased safety awareness, impaired self care skills, pain, impaired functional mobility, decreased coordination.      Rehab Prognosis: Good; patient would benefit from acute skilled OT services to address these deficits and reach maximum level of function.       Plan:     Patient to be seen 2 x/week to address the above listed problems via self-care/home management, therapeutic activities, therapeutic exercises  Plan of Care Expires: 07/09/24  Plan of Care Reviewed with: patient    Subjective     Chief Complaint: Headache, weakness  Patient/Family Comments/goals: increase strength and independence    Occupational Profile:  Living Environment: Lives with spouse in one story home, no steps to enter  Previous level of function: Independent with self-care and functional mobility  Roles and Routines: Patient hasn't driven since February when she has pneumonia   Equipment Used at Home: none (owns RW, cane, BSC, shower chair but does not use)  Assistance upon Discharge: spouse    Pain/Comfort:  Pain Rating 1: 4/10 (migraine)  Location 1: head  Pain Addressed 1: Nurse notified  Pain Rating Post-Intervention 1: 4/10    Patients cultural, spiritual, Catholic conflicts given the current situation:      Objective:     Communicated with: Nurse prior to session.  Patient found supine with arterial line, bed alarm,  blood pressure cuff, oxygen, peripheral IV, pulse ox (continuous), telemetry upon OT entry to room.    General Precautions: Standard, fall, contact  Orthopedic Precautions: N/A  Braces: N/A  Respiratory Status: Nasal cannula, flow 2 L/min    Occupational Performance:    Bed Mobility:    Patient completed Rolling/Turning to Right with minimum assistance  Patient completed Scooting/Bridging with minimum assistance  Patient completed Supine to Sit with minimum assistance  Patient completed Sit to Supine with minimum assistance    Functional Mobility/Transfers:  Patient completed Sit <> Stand Transfer with minimum assistance  with  hand-held assist   Functional Mobility: Pt took x5 steps forward/backward HHA min A- limited due to multiple lines/attachments    Cognitive/Visual Perceptual:  Cognitive/Psychosocial Skills:     -       Oriented to: Person, Place, Time, and Situation   -       Follows Commands/attention:Follows multistep  commands  -       Memory: No Deficits noted  -       Safety awareness/insight to disability: impaired     Physical Exam:  Balance:    -       fair  Upper Extremity Range of Motion:     -       Right Upper Extremity: WNL passively- difficulty achieving greater than 75% actively due to proximal weakness   -       Left Upper Extremity: same as above  Upper Extremity Strength:    -       Right Upper Extremity: 2+/5 grossly  -       Left Upper Extremity: 2+/5 grossly     AMPAC 6 Click ADL:  AMPAC Total Score: 20    Treatment & Education:  OT evaluation completed. Pt bed mobility min A. Sit to stand min A. Pt took x5 steps forward/backward min A HHA (limited due to multiple lines/attachments). Pt returned to supine min A with nursing present. Increased time needed for all activities due to weakness. Pt educated on BUE exercises to perform while in hospital to increase strength and mobility. Pt verbalized understanding. Pt left supine per request due to increased weakness from current symptoms. Pt  encouraged to utilize call button for any further assistance. Pt verbalized understanding.    Patient left supine with all lines intact, call button in reach, bed alarm on, and nursing present    GOALS:   Multidisciplinary Problems       Occupational Therapy Goals          Problem: Occupational Therapy    Goal Priority Disciplines Outcome Interventions   Occupational Therapy Goal     OT, PT/OT     Description: Goals to be met by: 7/9/24     Patient will increase functional independence with ADLs by performing:    Toileting from toilet with Contact Guard Assistance for hygiene and clothing management.   Toilet transfer to toilet with Contact Guard Assistance.  Upper extremity exercise program x10 reps per handout, with supervision.                         History:     Past Medical History:   Diagnosis Date    Chronic headaches     Depression     Diverticulitis     GERD (gastroesophageal reflux disease)     Hypercholesteremia     Hypertension     ILD (interstitial lung disease)     Migraine headache          Past Surgical History:   Procedure Laterality Date    ABDOMINAL SURGERY      APPENDECTOMY      CATARACT EXTRACTION      EYE SURGERY      HYSTERECTOMY      TONSILLECTOMY         Time Tracking:     OT Date of Treatment: 06/25/24  OT Start Time: 1310  OT Stop Time: 1335  OT Total Time (min): 25 min    Billable Minutes:Evaluation 15  Therapeutic Activity 10    ANDERSON Kumar  6/25/2024

## 2024-06-25 NOTE — PROGRESS NOTES
[facility] - Intensive Care ([facility])  Critical Care Medicine  Progress Note    Patient Name: Gaby Stanley  MRN: [id number]  Admission Date: 6/24/2024  Hospital Length of Stay: 1 days  Code Status: Full Code  Attending Provider: Mekhi Winter MD  Primary Care Provider: Larissa, Primary Doctor   Principal Problem: Septic shock    Subjective:     HPI:  74 year old female with known medical issues including HTN; migraines; hypercholesterol; GERD; along with noted hospitalization 2/17/24-2/19/24) for pneumonia with GGO noted on CT Chest and has been followed up outpt for possible ILD vs scarring related to prolonged macrobid therapy, she has been treated with oral steroid therapy which stopped about 2 weeks ago and the macrobid therapy discontinued    On 6/24 she presented to clinic with c/o cough, SOB, chills, N/V/Diarrhea. Clinic noted hypotension and sent to [facility] ED  Further eval showed wbc 7.0; potassium 2.8; mag 1.2; creatinine 1.3; lactate 2.7; procal 26; negative flu, covid screens  CXR with LLL and RML opacification worsening on second film after CL placement  Treated with 3.7L IVF IV abx, 20mEq potassium, and 2g Mag  Refractory hypotension required CL placement for pressor initiation    Transferring to [facility] ICU for admission on pressor support    Hospital/ICU Course:  6/25: no new issues or c/o since pt admitted to the ICU, off pressors with stable BP, on 2L NC in NAD,  at bedside this AM - all questions answered    ROS complete and negative unless stated in the interval HPI     Objective:     Vital Signs (Most Recent):  Temp: 98.8 °F (37.1 °C) (06/25/24 0330)  Pulse: 89 (06/25/24 1100)  Resp: (!) 31 (06/25/24 1100)  BP: (!) 150/72 (06/25/24 1100)  SpO2: (!) 91 % (06/25/24 1100) Vital Signs (24h Range):  Temp:  [98.3 °F (36.8 °C)-99.4 °F (37.4 °C)] 98.8 °F (37.1 °C)  Pulse:  [] 89  Resp:  [14-34] 31  SpO2:  [87 %-99 %] 91 %  BP: ()/(39-73) 150/72     Weight: 67.9 kg (149 lb 11.1 oz)  Body  mass index is 24.91 kg/m².      Intake/Output Summary (Last 24 hours) at 6/25/2024 1118  Last data filed at 6/25/2024 1031  Gross per 24 hour   Intake 5667.5 ml   Output 1250 ml   Net 4417.5 ml        Physical Exam  Vitals reviewed.   Constitutional:       General: She is awake. She is not in acute distress.     Appearance: She is well-developed. She is ill-appearing.      Comments: On 2L NC   Cardiovascular:      Rate and Rhythm: Normal rate.      Pulses:           Radial pulses are 2+ on the right side and 2+ on the left side.   Pulmonary:      Effort: Pulmonary effort is normal.      Breath sounds: Decreased breath sounds present. No wheezing, rhonchi or rales.      Comments: On 2L NC  Abdominal:      General: There is no distension.      Palpations: Abdomen is soft.   Musculoskeletal:      Right lower leg: No edema.      Left lower leg: No edema.      Comments: KOLB   Skin:     General: Skin is warm and dry.   Neurological:      General: No focal deficit present.      Mental Status: She is alert.   Psychiatric:         Behavior: Behavior is cooperative.                Lines/Drains/Airways       Central Venous Catheter Line  Duration             Percutaneous Central Line - Triple Lumen  06/24/24 1626 Internal Jugular Right <1 day              Peripheral Intravenous Line  Duration                  Peripheral IV - Single Lumen 06/24/24 1202 18 G Right Antecubital <1 day         Peripheral IV - Single Lumen 06/24/24 1350 22 G Right Hand <1 day                    Significant Labs:    CBC/Anemia Profile:  Recent Labs   Lab 06/24/24  1213 06/24/24  2152 06/25/24  0408   WBC 7.06 11.40 11.74   HGB 11.4* 8.9* 8.6*   HCT 34.8* 27.3* 26.6*    142* 142*   MCV 87 88 86   RDW 14.5 14.9* 14.9*        Chemistries:  Recent Labs   Lab 06/24/24  1209 06/24/24  1544 06/24/24  2152 06/25/24  0408     --  142 142   K 2.8*  --  3.4* 3.6     --  113* 113*   CO2 24  --  19* 22*   BUN 12  --  12 12   CREATININE 1.3  --   "0.8 0.7   CALCIUM 9.3  --  7.9* 8.2*   ALBUMIN 3.8  --   --  2.7*   PROT 7.1  --   --  5.2*   BILITOT 0.5  --   --  0.3   ALKPHOS 110  --   --  79   ALT 14  --   --  23   AST 25  --   --  38   MG  --  1.2* 1.8 1.9   PHOS  --   --   --  2.4*       All pertinent labs within the past 24 hours have been reviewed.    Significant Imaging:  I have reviewed all pertinent imaging results/findings within the past 24 hours.    ABG  No results for input(s): "PH", "PO2", "PCO2", "HCO3", "BE" in the last 168 hours.  Assessment/Plan:     Pulmonary  Interstitial lung disease  - see plan for pneumonia     Pneumonia of both lower lobes due to infectious organism  - imaging reviewed; concern for macrobid related lung injury (drug has been previously stopped), ILD, IPF, BOOP, pneumonitis, scarring from GERD, other   - on broad spec abx with rocephin and vanc  - sputum culture collected, follow  - prednisone   - on 2L NC with sat of 90-91%  - echo normal Feb of this year  - procal elevated  - check fungal workup and start rheum workup  - PT/OT, IS, OOB, mobilize   - [facility] Pulm note reviewed from 6/13 that reports normal PFTs and was stated on Trelegy with cough meds     ID  * Septic shock  This patient has shock. The type of shock is distributive due to sepsis.   The patient has the following evidence of shock: persistent hypotension.   The patient will be admitted to an intensive care unit, they will be treated with IV abx, steroid, pressor support to keep MAP > 65, and ICU hemodynamic monitoring  - off pressors with stable VS  - on broad spec abx with vanc and rocephin  - follow blood and sputum cultures, UA was normal   - procal remains elevated with WBC 11K  - has been on steroids essentially since 2/2024 so will start fungal workup as likely has some degree of immunocompromised state at this juncture   - reports treatment with multiple courses of abx and steroids since last fall   - pt also with diarrhea, will check for c diff and " start empiric flagyl     GI  GERD (gastroesophageal reflux disease)  - protonix BID, needs good control of her GERD      Prophylaxis Measures:  GI ppx: Pantoprazole  VTE ppx: Enoxaparin  Glucose control: Monitor blood glucose    Code Status: Full Code    Patient stable for care outside of the ICU setting. [facility] consulted. Pulmonary team to follow after transfer.      Andre Dawkins NP  Critical Care Medicine  [facility] - Intensive Care ([facility])

## 2024-06-25 NOTE — NURSING
TRAVEL NOTE    6/24/2024 11:12 PM    Patient transported to and from CT scan via bed   Transported by: YUDITH Reina and YUDITH Stevens  ID band on patient - Yes  Transported with:   O2 tank - Yes  Ambu bag - Yes  Airway bag - Yes  Chart - Yes  Continuous EKG monitoring maintained throughout trip Yes    See flowsheets for assessments and VS details.    YUDITH Reina  6/24/2024 11:12 PM

## 2024-06-25 NOTE — SUBJECTIVE & OBJECTIVE
Past Medical History:   Diagnosis Date    Chronic headaches     Depression     Diverticulitis     GERD (gastroesophageal reflux disease)     Hypercholesteremia     Hypertension     ILD (interstitial lung disease)     Migraine headache        Past Surgical History:   Procedure Laterality Date    ABDOMINAL SURGERY      APPENDECTOMY      CATARACT EXTRACTION      EYE SURGERY      HYSTERECTOMY      TONSILLECTOMY         Review of patient's allergies indicates:   Allergen Reactions    Amitriptyline     Amoxicillin-pot clavulanate Other (See Comments)    Sulfa (sulfonamide antibiotics)        Family History    None       Tobacco Use    Smoking status: Former    Smokeless tobacco: Never   Substance and Sexual Activity    Alcohol use: No    Drug use: No    Sexual activity: Yes         Review of Systems   Constitutional:  Positive for chills. Negative for fever.   HENT:  Negative for trouble swallowing.    Respiratory:  Negative for cough and shortness of breath.    Cardiovascular:  Negative for chest pain.   Gastrointestinal:  Positive for diarrhea, nausea and vomiting.   Skin:  Negative for wound.   Neurological:  Positive for weakness and headaches.     Objective:     Vital Signs (Most Recent):  Temp: 99.4 °F (37.4 °C) (06/24/24 2145)  Pulse: 93 (06/24/24 2145)  Resp: 20 (06/24/24 2145)  BP: (!) 143/73 (06/24/24 2145)  SpO2: (!) 93 % (06/24/24 2145) Vital Signs (24h Range):  Temp:  [98.3 °F (36.8 °C)-100.4 °F (38 °C)] 99.4 °F (37.4 °C)  Pulse:  [] 95  Resp:  [14-31] 20  SpO2:  [89 %-99 %] 90 %  BP: ()/(39-67) 132/67     Weight: 64.4 kg (141 lb 15.6 oz)  Body mass index is 23.63 kg/m².      Intake/Output Summary (Last 24 hours) at 6/24/2024 2057  Last data filed at 6/24/2024 2039  Gross per 24 hour   Intake 4410 ml   Output 150 ml   Net 4260 ml      NORepinephrine bitartrate-D5W  0-3 mcg/kg/min Intravenous Continuous 12.1 mL/hr at 06/24/24 2147 0.05 mcg/kg/min at 06/24/24 2147          Physical Exam  Vitals and  nursing note reviewed.   Constitutional:       General: She is awake. She is not in acute distress.     Appearance: She is normal weight. She is ill-appearing.      Interventions: Nasal cannula in place.   HENT:      Head: Atraumatic.   Eyes:      Conjunctiva/sclera: Conjunctivae normal.   Neck:      Vascular: No JVD.   Cardiovascular:      Rate and Rhythm: Normal rate and regular rhythm.      Pulses:           Radial pulses are 2+ on the right side and 2+ on the left side.        Dorsalis pedis pulses are 2+ on the right side and 2+ on the left side.   Pulmonary:      Effort: Pulmonary effort is normal.      Breath sounds: Rales present. No wheezing or rhonchi.   Abdominal:      General: Bowel sounds are normal. There is no distension.      Palpations: Abdomen is soft.      Tenderness: There is no abdominal tenderness.   Musculoskeletal:      Right lower leg: No edema.      Left lower leg: No edema.   Skin:     General: Skin is warm and dry.      Capillary Refill: Capillary refill takes less than 2 seconds.      Findings: No wound.   Neurological:      Mental Status: She is alert.      GCS: GCS eye subscore is 4. GCS verbal subscore is 5. GCS motor subscore is 6.   Psychiatric:         Attention and Perception: Attention normal.         Mood and Affect: Mood normal.         Speech: Speech normal.         Behavior: Behavior normal. Behavior is cooperative.         Thought Content: Thought content normal.          Vents:       Lines/Drains/Airways       Central Venous Catheter Line  Duration             Percutaneous Central Line - Triple Lumen  06/24/24 1626 Internal Jugular Right <1 day              Peripheral Intravenous Line  Duration                  Peripheral IV - Single Lumen 06/24/24 1202 18 G Right Antecubital <1 day         Peripheral IV - Single Lumen 06/24/24 1350 22 G Right Hand <1 day                    Significant Labs:    CBC/Anemia Profile:  Recent Labs   Lab 06/24/24  1213   WBC 7.06   HGB 11.4*    HCT 34.8*      MCV 87   RDW 14.5        Chemistries:  Recent Labs   Lab 06/24/24  1209 06/24/24  1544     --    K 2.8*  --      --    CO2 24  --    BUN 12  --    CREATININE 1.3  --    CALCIUM 9.3  --    ALBUMIN 3.8  --    PROT 7.1  --    BILITOT 0.5  --    ALKPHOS 110  --    ALT 14  --    AST 25  --    MG  --  1.2*       Lactic Acid:   Recent Labs   Lab 06/24/24  1209 06/24/24  1544 06/24/24  2017   LACTATE 2.7* 2.7* 1.6     Urine Studies:   Recent Labs   Lab 06/24/24  1337   COLORU Yellow   APPEARANCEUA Clear   PHUR 6.0   SPECGRAV 1.010   PROTEINUA Negative   GLUCUA Negative   KETONESU Negative   BILIRUBINUA Negative   OCCULTUA Negative   NITRITE Negative   UROBILINOGEN Negative   LEUKOCYTESUR Negative     All pertinent labs within the past 24 hours have been reviewed.    Significant Imaging:   I have reviewed all pertinent imaging results/findings within the past 24 hours.

## 2024-06-25 NOTE — ASSESSMENT & PLAN NOTE
This patient has shock. The type of shock is distributive due to sepsis.   The patient has the following evidence of shock: persistent hypotension.   The patient will be admitted to an intensive care unit, they will be treated with IV abx, steroid, pressor support to keep MAP > 65, and ICU hemodynamic monitoring  - off pressors with stable VS  - on broad spec abx with vanc and rocephin  - follow blood and sputum cultures, UA was normal   - procal remains elevated with WBC 11K  - has been on steroids essentially since 2/2024 so will start fungal workup as likely has some degree of immunocompromised state at this juncture   - reports treatment with multiple courses of abx and steroids since last fall   - pt also with diarrhea, will check for c diff

## 2024-06-25 NOTE — NURSING
Patient arrived to ICU room A 05/A 05. Via [facility] from [facility]. With Levophed infusing and on 2 L NC. Connected to bedside monitor - cardiac monitoring and continuous pulse oximetry applied. Call bell within reach, side rails raised x 2, bed locked and in lowest position. Primary service notified of patient arrival. ZAHIDA Gaffney at bedside. Patient in no acute distress.    Nurses Note -- 4 Eyes    6/24/2024  5835    Skin assessed during: Admit      [x] No Altered Skin Integrity Present    [x]Prevention Measures Documented      [] Yes- Altered Skin Integrity Present or Discovered   [] LDA Added if Not in Epic (Describe Wound)   [] New Altered Skin Integrity was Present on Admit and Documented in LDA   [] Wound Image Taken    Wound Care Consulted? No    Attending Nurse:  Latosha García RN/Staff Member:  YUDITH Stevens

## 2024-06-25 NOTE — ASSESSMENT & PLAN NOTE
Patient has a diagnosis of pneumonia. The cause of the pneumonia is suspected to be bacterial in etiology but organism is not known. The pneumonia is worsening due to presentation with septic shock . The patient has the following signs/symptoms of pneumonia: cough and shortness of breath. The patient does have a current oxygen requirement and the patient does not have a home oxygen requirement. I have reviewed the pertinent imaging. The following cultures have been collected: Blood cultures The culture results are listed below.     Current antimicrobial regimen consists of the antibiotics listed below. Will monitor patient closely and Adjust treatment plan as follows continue abx, add steroid, check chest CT . Supplemental oxygen to keep Sat > 92    Antibiotics (From admission, onward)      Start     Stop Route Frequency Ordered    06/25/24 1200  cefTRIAXone (Rocephin) 1 g in D5W 100 mL IVPB (MB+)         -- IV Every 24 hours (non-standard times) 06/24/24 2108            Microbiology Results (last 7 days)       Procedure Component Value Units Date/Time    Blood culture x two cultures. Draw prior to antibiotics. [[id number]] Collected: 06/24/24 1202    Order Status: Sent Specimen: Blood from Peripheral, Antecubital, Right     Blood culture x two cultures. Draw prior to antibiotics. [[id number]] Collected: 06/24/24 1200    Order Status: Sent Specimen: Blood from Peripheral, Forearm, Left

## 2024-06-25 NOTE — HPI
74 year old female with known medical issues including HTN; migraines; hypercholesterol; GERD; along with noted hospitalization 2/17/24-2/19/24) for pneumonia with GGO noted on CT Chest and has been followed up outpt for possible ILD vs scarring related to prolonged macrobid therapy, she has been treated with oral steroid therapy which stopped about 2 weeks ago and the macrobid therapy discontinued    On 6/24 she presented to clinic with c/o cough, SOB, chills, N/V/Diarrhea. Clinic noted hypotension and sent to [facility] ED  Further eval showed wbc 7.0; potassium 2.8; mag 1.2; creatinine 1.3; lactate 2.7; procal 26; negative flu, covid screens  CXR with LLL and RML opacification worsening on second film after CL placement  Treated with 3.7L IVF IV abx, 20mEq potassium, and 2g Mag  Refractory hypotension required CL placement for pressor initiation    Transferring to [facility] ICU for admission on pressor support    Interval History:  6/25: no new issues or c/o since pt admitted to the ICU, off pressors with stable BP, on 2L NC in NAD,  at bedside this AM - all questions answered  6/26: no new issues, pt reports she did not sleep last night and needs her home trazodone resumed, breathing better/stable, awaiting bed outside of the ICU

## 2024-06-25 NOTE — ASSESSMENT & PLAN NOTE
This patient has shock. The type of shock is distributive due to sepsis.   The patient has the following evidence of shock: persistent hypotension.   The patient will be admitted to an intensive care unit, they will be treated with IV abx, steroid, pressor support to keep MAP > 65, and ICU hemodynamic monitoring  - remains off pressors, stop vanc based on culture data and clinical course   - ID has been consulted per HM  - blood cultures remain with NGTD, sputum in process  - procal elevated but improved   - has been on steroids essentially since 2/2024 so will start fungal workup as likely has some degree of immunocompromised state at this juncture   - reports treatment with multiple courses of abx and steroids since last fall   - diarrhea improved, c diff toxin negative

## 2024-06-25 NOTE — SUBJECTIVE & OBJECTIVE
ROS complete and negative unless stated in the interval HPI     Objective:     Vital Signs (Most Recent):  Temp: 98.8 °F (37.1 °C) (06/25/24 0330)  Pulse: 89 (06/25/24 1100)  Resp: (!) 31 (06/25/24 1100)  BP: (!) 150/72 (06/25/24 1100)  SpO2: (!) 91 % (06/25/24 1100) Vital Signs (24h Range):  Temp:  [98.3 °F (36.8 °C)-99.4 °F (37.4 °C)] 98.8 °F (37.1 °C)  Pulse:  [] 89  Resp:  [14-34] 31  SpO2:  [87 %-99 %] 91 %  BP: ()/(39-73) 150/72     Weight: 67.9 kg (149 lb 11.1 oz)  Body mass index is 24.91 kg/m².      Intake/Output Summary (Last 24 hours) at 6/25/2024 1118  Last data filed at 6/25/2024 1031  Gross per 24 hour   Intake 5667.5 ml   Output 1250 ml   Net 4417.5 ml        Physical Exam  Vitals reviewed.   Constitutional:       General: She is awake. She is not in acute distress.     Appearance: She is well-developed. She is ill-appearing.      Comments: On 2L NC   Cardiovascular:      Rate and Rhythm: Normal rate.      Pulses:           Radial pulses are 2+ on the right side and 2+ on the left side.   Pulmonary:      Effort: Pulmonary effort is normal.      Breath sounds: Decreased breath sounds present. No wheezing, rhonchi or rales.      Comments: On 2L NC  Abdominal:      General: There is no distension.      Palpations: Abdomen is soft.   Musculoskeletal:      Right lower leg: No edema.      Left lower leg: No edema.      Comments: KOLB   Skin:     General: Skin is warm and dry.   Neurological:      General: No focal deficit present.      Mental Status: She is alert.   Psychiatric:         Behavior: Behavior is cooperative.                Lines/Drains/Airways       Central Venous Catheter Line  Duration             Percutaneous Central Line - Triple Lumen  06/24/24 1626 Internal Jugular Right <1 day              Peripheral Intravenous Line  Duration                  Peripheral IV - Single Lumen 06/24/24 1202 18 G Right Antecubital <1 day         Peripheral IV - Single Lumen 06/24/24 1350 22 G Right  Hand <1 day                    Significant Labs:    CBC/Anemia Profile:  Recent Labs   Lab 06/24/24  1213 06/24/24 2152 06/25/24  0408   WBC 7.06 11.40 11.74   HGB 11.4* 8.9* 8.6*   HCT 34.8* 27.3* 26.6*    142* 142*   MCV 87 88 86   RDW 14.5 14.9* 14.9*        Chemistries:  Recent Labs   Lab 06/24/24  1209 06/24/24  1544 06/24/24 2152 06/25/24  0408     --  142 142   K 2.8*  --  3.4* 3.6     --  113* 113*   CO2 24  --  19* 22*   BUN 12  --  12 12   CREATININE 1.3  --  0.8 0.7   CALCIUM 9.3  --  7.9* 8.2*   ALBUMIN 3.8  --   --  2.7*   PROT 7.1  --   --  5.2*   BILITOT 0.5  --   --  0.3   ALKPHOS 110  --   --  79   ALT 14  --   --  23   AST 25  --   --  38   MG  --  1.2* 1.8 1.9   PHOS  --   --   --  2.4*       All pertinent labs within the past 24 hours have been reviewed.    Significant Imaging:  I have reviewed all pertinent imaging results/findings within the past 24 hours.

## 2024-06-25 NOTE — PLAN OF CARE
Pt remains AAOx4. NSR on telemetry with PVCs. Pt remained off pressors entire shift. Central line removed. Oxygenating well on 2L NC. SOB on exertion. Pt with intermittent nausea today. Zofran and Phenergan PRN. Multiple loose/watery stools today. Pt placed in special contact precautions for C-diff rule out. Oral vanc initiated. Voids spontaneously to bsc. Pt repositions self independentl with encouragement. Bed low, wheels locked, alarms audible. POC reviewed with pt and spouse today.

## 2024-06-25 NOTE — PROGRESS NOTES
Pharmacokinetic Initial Assessment: IV Vancomycin    Assessment/Plan:    Initiate intravenous vancomycin with loading dose of 1750 mg once followed by a maintenance dose of vancomycin 1250 mg IV every 24 hours  Desired empiric serum trough concentration is 15 to 20 mcg/mL  Draw vancomycin trough level 60 min prior to third dose on 06/27 at approximately 0300  Pharmacy will continue to follow and monitor vancomycin.      Please contact pharmacy at extension [phone] with any questions regarding this assessment.     Thank you for the consult,   Teganmolly Valdivia       Patient brief summary:  Gaby Stanley is a 74 y.o. female initiated on antimicrobial therapy with IV Vancomycin for treatment of suspected lower respiratory infection    Drug Allergies:   Review of patient's allergies indicates:   Allergen Reactions    Amitriptyline     Amoxicillin-pot clavulanate Other (See Comments)    Sulfa (sulfonamide antibiotics)        Actual Body Weight:   67.9 kg    Renal Function:   Estimated Creatinine Clearance: 55.5 mL/min (based on SCr of 0.8 mg/dL).,     Dialysis Method (if applicable):  N/A    CBC (last 72 hours):  Recent Labs   Lab Result Units 06/24/24  1213 06/24/24  2152   WBC K/uL 7.06 11.40   Hemoglobin g/dL 11.4* 8.9*   Hematocrit % 34.8* 27.3*   Platelets K/uL 163 142*   Gran % % 60.0 71.0   Lymph % % 11.0* 6.0*   Mono % % 8.0 2.0*   Eosinophil % % 1.0 0.0   Basophil % % 0.0 0.0   Differential Method  Manual Manual       Metabolic Panel (last 72 hours):  Recent Labs   Lab Result Units 06/24/24  1209 06/24/24  1337 06/24/24  1544 06/24/24  2152   Sodium mmol/L 144  --   --  142   Potassium mmol/L 2.8*  --   --  3.4*   Chloride mmol/L 106  --   --  113*   CO2 mmol/L 24  --   --  19*   Glucose mg/dL 106  --   --  81   Glucose, UA   --  Negative  --   --    BUN mg/dL 12  --   --  12   Creatinine mg/dL 1.3  --   --  0.8   Albumin g/dL 3.8  --   --   --    Total Bilirubin mg/dL 0.5  --   --   --    Alkaline Phosphatase U/L  "110  --   --   --    AST U/L 25  --   --   --    ALT U/L 14  --   --   --    Magnesium mg/dL  --   --  1.2* 1.8       Drug levels (last 3 results):  No results for input(s): "VANCOMYCINRA", "VANCORANDOM", "VANCOMYCINPE", "VANCOPEAK", "VANCOMYCINTR", "VANCOTROUGH" in the last 72 hours.    Microbiologic Results:  Microbiology Results (last 7 days)       Procedure Component Value Units Date/Time    Blood culture x two cultures. Draw prior to antibiotics. [[id number]] Collected: 06/24/24 1202    Order Status: Sent Specimen: Blood from Peripheral, Antecubital, Right Updated: 06/24/24 2122    Blood culture x two cultures. Draw prior to antibiotics. [[id number]] Collected: 06/24/24 1200    Order Status: Sent Specimen: Blood from Peripheral, Forearm, Left Updated: 06/24/24 2122            "

## 2024-06-25 NOTE — H&P
[facility] ([facility])  Critical Care Medicine  History & Physical    Patient Name: Gaby Stanley  MRN: [id number]  Admission Date: 6/24/2024  Hospital Length of Stay: 0 days  Code Status: Full Code  Attending Physician: Daniela Ramirez MD   Primary Care Provider: Larissa, Primary Doctor   Principal Problem: Septic shock    Subjective:     HPI:  74 year old female with known medical issues including HTN; migraines; hypercholesterol; GERD; along with noted hospitalization 2/17/24-2/19/24) for pneumonia with GGO noted on CT Chest and has been followed up outpt for possible ILD vs scarring related to prolonged macrobid therapy, she has been treated with oral steroid therapy which stopped about 2 weeks ago and the macrobid therapy discontinued    On 6/24 she presented to clinic with c/o cough, SOB, chills, N/V/Diarrhea. Clinic noted hypotension and sent to [facility] ED  Further eval showed wbc 7.0; potassium 2.8; mag 1.2; creatinine 1.3; lactate 2.7; procal 26; negative flu, covid screens  CXR with LLL and RML opacification worsening on second film after CL placement  Treated with 3.7L IVF IV abx, 20mEq potassium, and 2g Mag  Refractory hypotension required CL placement for pressor initiation    Transferring to [facility] ICU for admission on pressor support    Hospital/ICU Course:  No notes on file     Past Medical History:   Diagnosis Date    Chronic headaches     Depression     Diverticulitis     GERD (gastroesophageal reflux disease)     Hypercholesteremia     Hypertension     ILD (interstitial lung disease)     Migraine headache        Past Surgical History:   Procedure Laterality Date    ABDOMINAL SURGERY      APPENDECTOMY      CATARACT EXTRACTION      EYE SURGERY      HYSTERECTOMY      TONSILLECTOMY         Review of patient's allergies indicates:   Allergen Reactions    Amitriptyline     Amoxicillin-pot clavulanate Other (See Comments)    Sulfa (sulfonamide antibiotics)        Family History    None        Tobacco Use    Smoking status: Former    Smokeless tobacco: Never   Substance and Sexual Activity    Alcohol use: No    Drug use: No    Sexual activity: Yes         Review of Systems   Constitutional:  Positive for chills. Negative for fever.   HENT:  Negative for trouble swallowing.    Respiratory:  Negative for cough and shortness of breath.    Cardiovascular:  Negative for chest pain.   Gastrointestinal:  Positive for diarrhea, nausea and vomiting.   Skin:  Negative for wound.   Neurological:  Positive for weakness and headaches.     Objective:     Vital Signs (Most Recent):  Temp: 99.4 °F (37.4 °C) (06/24/24 2145)  Pulse: 93 (06/24/24 2145)  Resp: 20 (06/24/24 2145)  BP: (!) 143/73 (06/24/24 2145)  SpO2: (!) 93 % (06/24/24 2145) Vital Signs (24h Range):  Temp:  [98.3 °F (36.8 °C)-100.4 °F (38 °C)] 99.4 °F (37.4 °C)  Pulse:  [] 95  Resp:  [14-31] 20  SpO2:  [89 %-99 %] 90 %  BP: ()/(39-67) 132/67     Weight: 64.4 kg (141 lb 15.6 oz)  Body mass index is 23.63 kg/m².      Intake/Output Summary (Last 24 hours) at 6/24/2024 2057  Last data filed at 6/24/2024 2039  Gross per 24 hour   Intake 4410 ml   Output 150 ml   Net 4260 ml      NORepinephrine bitartrate-D5W  0-3 mcg/kg/min Intravenous Continuous 12.1 mL/hr at 06/24/24 2147 0.05 mcg/kg/min at 06/24/24 2147          Physical Exam  Vitals and nursing note reviewed.   Constitutional:       General: She is awake. She is not in acute distress.     Appearance: She is normal weight. She is ill-appearing.      Interventions: Nasal cannula in place.   HENT:      Head: Atraumatic.   Eyes:      Conjunctiva/sclera: Conjunctivae normal.   Neck:      Vascular: No JVD.   Cardiovascular:      Rate and Rhythm: Normal rate and regular rhythm.      Pulses:           Radial pulses are 2+ on the right side and 2+ on the left side.        Dorsalis pedis pulses are 2+ on the right side and 2+ on the left side.   Pulmonary:      Effort: Pulmonary effort is normal.       Breath sounds: Rales present. No wheezing or rhonchi.   Abdominal:      General: Bowel sounds are normal. There is no distension.      Palpations: Abdomen is soft.      Tenderness: There is no abdominal tenderness.   Musculoskeletal:      Right lower leg: No edema.      Left lower leg: No edema.   Skin:     General: Skin is warm and dry.      Capillary Refill: Capillary refill takes less than 2 seconds.      Findings: No wound.   Neurological:      Mental Status: She is alert.      GCS: GCS eye subscore is 4. GCS verbal subscore is 5. GCS motor subscore is 6.   Psychiatric:         Attention and Perception: Attention normal.         Mood and Affect: Mood normal.         Speech: Speech normal.         Behavior: Behavior normal. Behavior is cooperative.         Thought Content: Thought content normal.          Vents:       Lines/Drains/Airways       Central Venous Catheter Line  Duration             Percutaneous Central Line - Triple Lumen  06/24/24 1626 Internal Jugular Right <1 day              Peripheral Intravenous Line  Duration                  Peripheral IV - Single Lumen 06/24/24 1202 18 G Right Antecubital <1 day         Peripheral IV - Single Lumen 06/24/24 1350 22 G Right Hand <1 day                    Significant Labs:    CBC/Anemia Profile:  Recent Labs   Lab 06/24/24  1213   WBC 7.06   HGB 11.4*   HCT 34.8*      MCV 87   RDW 14.5        Chemistries:  Recent Labs   Lab 06/24/24  1209 06/24/24  1544     --    K 2.8*  --      --    CO2 24  --    BUN 12  --    CREATININE 1.3  --    CALCIUM 9.3  --    ALBUMIN 3.8  --    PROT 7.1  --    BILITOT 0.5  --    ALKPHOS 110  --    ALT 14  --    AST 25  --    MG  --  1.2*       Lactic Acid:   Recent Labs   Lab 06/24/24  1209 06/24/24  1544 06/24/24  2017   LACTATE 2.7* 2.7* 1.6     Urine Studies:   Recent Labs   Lab 06/24/24  1337   COLORU Yellow   APPEARANCEUA Clear   PHUR 6.0   SPECGRAV 1.010   PROTEINUA Negative   GLUCUA Negative   KETONESU  Negative   BILIRUBINUA Negative   OCCULTUA Negative   NITRITE Negative   UROBILINOGEN Negative   LEUKOCYTESUR Negative     All pertinent labs within the past 24 hours have been reviewed.    Significant Imaging:   I have reviewed all pertinent imaging results/findings within the past 24 hours.  Assessment/Plan:     Pulmonary  Interstitial lung disease  Vs Macrobid related lung injury  Recently completed steroid therapy outpt  -follow up chest CT  -add steroid therapy    Pneumonia of both lower lobes due to infectious organism  Patient has a diagnosis of pneumonia. The cause of the pneumonia is suspected to be bacterial in etiology but organism is not known. The pneumonia is worsening due to presentation with septic shock . The patient has the following signs/symptoms of pneumonia: cough and shortness of breath. The patient does have a current oxygen requirement and the patient does not have a home oxygen requirement. I have reviewed the pertinent imaging. The following cultures have been collected: Blood cultures The culture results are listed below.     Current antimicrobial regimen consists of the antibiotics listed below. Will monitor patient closely and Adjust treatment plan as follows continue abx, add steroid, check chest CT . Supplemental oxygen to keep Sat > 92    Antibiotics (From admission, onward)      Start     Stop Route Frequency Ordered    06/25/24 1200  cefTRIAXone (Rocephin) 1 g in D5W 100 mL IVPB (MB+)         -- IV Every 24 hours (non-standard times) 06/24/24 2108            Microbiology Results (last 7 days)       Procedure Component Value Units Date/Time    Blood culture x two cultures. Draw prior to antibiotics. [[id number]] Collected: 06/24/24 1202    Order Status: Sent Specimen: Blood from Peripheral, Antecubital, Right     Blood culture x two cultures. Draw prior to antibiotics. [[id number]] Collected: 06/24/24 1200    Order Status: Sent Specimen: Blood from Peripheral, Forearm, Left              ID  * Septic shock  This patient has shock. The type of shock is distributive due to sepsis.   The patient has the following evidence of shock: persistent hypotension.   The patient will be admitted to an intensive care unit, they will be treated with IV abx, steroid, pressor support to keep MAP > 65, and ICU hemodynamic monitoring    GI  GERD (gastroesophageal reflux disease)  Continue home therapy, protonix        Critical Care Daily Checklist:    A: Awake: RASS Goal/Actual Goal:    Actual:     B: Spontaneous Breathing Trial Performed?     C: SAT & SBT Coordinated?  N/a                      D: Delirium: CAM-ICU     E: Early Mobility Performed? Yes   F: Feeding Goal:    Status:     Current Diet Order   No orders of the defined types were placed in this encounter.      AS: Analgesia/Sedation prn   T: Thromboembolic Prophylaxis lovenox   H: HOB > 300 Yes   U: Stress Ulcer Prophylaxis (if needed) protonix   G: Glucose Control monitor   B: Bowel Function     I: Indwelling Catheter (Lines & Rivera) Necessity reviewed   D: De-escalation of Antimicrobials/Pharmacotherapies reviewed    Plan for the day/ETD As above    Code Status:  Family/Goals of Care: Full Code  Home on discharge     Critical Care Time: 50 minutes  Critical secondary to septic shock requiring pressor support  Critical care was time spent personally by me on the following activities: development of treatment plan with patient or surrogate and bedside caregivers, discussions with consultants, evaluation of patient's response to treatment, examination of patient, ordering and performing treatments and interventions, ordering and review of laboratory studies, ordering and review of radiographic studies, pulse oximetry, re-evaluation of patient's condition. This critical care time did not overlap with that of any other provider or involve time for any procedures.     APOLINAR Maurice-BC  Critical Care Medicine  [facility] ([facility])

## 2024-06-25 NOTE — ASSESSMENT & PLAN NOTE
- imaging reviewed; concern for macrobid related lung injury (drug has been previously stopped), ILD, IPF, BOOP, pneumonitis, scarring from GERD, other   - on broad spec abx with rocephin and vanc  - sputum culture collected, follow  - steroids, wean to lower dose as able and plan from prolonged course until she is able to f/u in clinic with Dr. Patel on July 24th at the [facility]  - on 2L NC  - echo normal Feb of this year  - procal elevated but improved   - check fungal workup and rheum workup  - PT/OT, IS, OOB, mobilize   - [facility] Pulm note reviewed from 6/13 that reports normal PFTs and was stated on Trelegy with cough meds

## 2024-06-25 NOTE — PLAN OF CARE
OT evaluation completed. Pt bed mobility min A. Sit to stand min A. Pt took x5 steps forward/backward min A HHA (limited due to multiple lines/attachments). Pt returned to supine min A with nursing present.   Rec low intensity therapy at ID

## 2024-06-25 NOTE — ASSESSMENT & PLAN NOTE
- imaging reviewed; concern for macrobid related lung injury (drug has been previously stopped), ILD, pneumonitis, other   - on broad spec abx with rocephin and vanc  - sputum culture collected, follow  - solumedrol and wean as able   - on 2L NC with sat of 90-91%  - echo normal Feb of this year  - procal elevated  - check fungal workup and start rheum workup  - PT/OT, IS, OOB, mobilize   - [facility] Pulm note reviewed from 6/13 that reports normal PFTs and was stated on Trelegy with cough meds

## 2024-06-25 NOTE — PLAN OF CARE
ALISAL AND TX COMPLETED: facilitated bed mobility with min A, transfers with min A. Ambulated 5 ft min A with HHA. Recommend low intensity PT services

## 2024-06-25 NOTE — PROGRESS NOTES
eICU Brief Admission Note       Briefly, 74 year old female with known medical issues including HTN; migraines; hypercholesterol; GERD; along with noted hospitalization 2/17/24-2/19/24) for pneumonia with GGO noted on CT Chest and has been followed up outpt for possible ILD vs scarring related to prolonged macrobid therapy, she has been treated with oral steroid therapy which stopped about 2 weeks ago and the macrobid therapy discontinued. On 6/24 she presented to clinic with c/o cough, SOB, chills, N/V/Diarrhea. Clinic noted hypotension and sent to [facility] ED      Video assessment :    Vitals reviewed   Afebrile, stable vitals     LABs reviewed       Radiology reviewed   CT chest   Bilateral mixed pulmonary opacity and small pleural effusions       Assessment / Plan :  Septic shock -- Pneumonia   Possible ILD   Anemia   Electrolyte imbalance   - on Ceftriaxone; add IV Vancomycin; f/u cultures, trend lactate ; check for PCP   - s/p IVF; use pressors as needed   - replace electrolytes as per protocol   - on Prednisone   - add LDH   - f/u Pulmonology       DVT Px : Lovenox SQ   GI Px : PPI

## 2024-06-26 LAB
ALBUMIN SERPL BCP-MCNC: 2.9 G/DL (ref 3.5–5.2)
ALP SERPL-CCNC: 85 U/L (ref 55–135)
ALT SERPL W/O P-5'-P-CCNC: 18 U/L (ref 10–44)
ANA SER QL IF: NORMAL
ANION GAP SERPL CALC-SCNC: 10 MMOL/L (ref 8–16)
AST SERPL-CCNC: 35 U/L (ref 10–40)
BASOPHILS NFR BLD: 0 % (ref 0–1.9)
BILIRUB DIRECT SERPL-MCNC: 0.1 MG/DL (ref 0.1–0.3)
BILIRUB SERPL-MCNC: 0.3 MG/DL (ref 0.1–1)
BUN SERPL-MCNC: 17 MG/DL (ref 8–23)
C DIFF TOX GENS STL QL NAA+PROBE: NEGATIVE
CALCIUM SERPL-MCNC: 9.3 MG/DL (ref 8.7–10.5)
CHLORIDE SERPL-SCNC: 107 MMOL/L (ref 95–110)
CO2 SERPL-SCNC: 24 MMOL/L (ref 23–29)
CREAT SERPL-MCNC: 0.8 MG/DL (ref 0.5–1.4)
DIFFERENTIAL METHOD BLD: ABNORMAL
EOSINOPHIL NFR BLD: 2 % (ref 0–8)
ERYTHROCYTE [DISTWIDTH] IN BLOOD BY AUTOMATED COUNT: 14.9 % (ref 11.5–14.5)
EST. GFR  (NO RACE VARIABLE): >60 ML/MIN/1.73 M^2
GLUCOSE SERPL-MCNC: 83 MG/DL (ref 70–110)
HCT VFR BLD AUTO: 28.4 % (ref 37–48.5)
HGB BLD-MCNC: 9.2 G/DL (ref 12–16)
IMM GRANULOCYTES # BLD AUTO: ABNORMAL K/UL (ref 0–0.04)
IMM GRANULOCYTES NFR BLD AUTO: ABNORMAL % (ref 0–0.5)
LYMPHOCYTES NFR BLD: 6 % (ref 18–48)
MAGNESIUM SERPL-MCNC: 2 MG/DL (ref 1.6–2.6)
MCH RBC QN AUTO: 28.3 PG (ref 27–31)
MCHC RBC AUTO-ENTMCNC: 32.4 G/DL (ref 32–36)
MCV RBC AUTO: 87 FL (ref 82–98)
MONOCYTES NFR BLD: 6 % (ref 4–15)
NEUTROPHILS NFR BLD: 70 % (ref 38–73)
NEUTS BAND NFR BLD MANUAL: 16 %
NRBC BLD-RTO: 0 /100 WBC
OVALOCYTES BLD QL SMEAR: ABNORMAL
PLATELET # BLD AUTO: 154 K/UL (ref 150–450)
PLATELET BLD QL SMEAR: ABNORMAL
PMV BLD AUTO: 10.9 FL (ref 9.2–12.9)
POTASSIUM SERPL-SCNC: 3.6 MMOL/L (ref 3.5–5.1)
PROCALCITONIN SERPL IA-MCNC: 20.19 NG/ML
PROT SERPL-MCNC: 6.1 G/DL (ref 6–8.4)
RBC # BLD AUTO: 3.25 M/UL (ref 4–5.4)
SODIUM SERPL-SCNC: 141 MMOL/L (ref 136–145)
WBC # BLD AUTO: 12.89 K/UL (ref 3.9–12.7)

## 2024-06-26 PROCEDURE — 99222 1ST HOSP IP/OBS MODERATE 55: CPT | Mod: ,,, | Performed by: STUDENT IN AN ORGANIZED HEALTH CARE EDUCATION/TRAINING PROGRAM

## 2024-06-26 PROCEDURE — 83735 ASSAY OF MAGNESIUM: CPT | Performed by: NURSE PRACTITIONER

## 2024-06-26 PROCEDURE — 25000003 PHARM REV CODE 250: Performed by: STUDENT IN AN ORGANIZED HEALTH CARE EDUCATION/TRAINING PROGRAM

## 2024-06-26 PROCEDURE — 25000003 PHARM REV CODE 250: Performed by: HOSPITALIST

## 2024-06-26 PROCEDURE — 63600175 PHARM REV CODE 636 W HCPCS: Performed by: SPECIALIST

## 2024-06-26 PROCEDURE — 25000003 PHARM REV CODE 250: Performed by: NURSE PRACTITIONER

## 2024-06-26 PROCEDURE — 85007 BL SMEAR W/DIFF WBC COUNT: CPT | Performed by: NURSE PRACTITIONER

## 2024-06-26 PROCEDURE — 21400001 HC TELEMETRY ROOM

## 2024-06-26 PROCEDURE — 25000242 PHARM REV CODE 250 ALT 637 W/ HCPCS: Performed by: NURSE PRACTITIONER

## 2024-06-26 PROCEDURE — 80076 HEPATIC FUNCTION PANEL: CPT | Performed by: NURSE PRACTITIONER

## 2024-06-26 PROCEDURE — 97110 THERAPEUTIC EXERCISES: CPT

## 2024-06-26 PROCEDURE — 85027 COMPLETE CBC AUTOMATED: CPT | Performed by: NURSE PRACTITIONER

## 2024-06-26 PROCEDURE — 25000003 PHARM REV CODE 250: Performed by: SPECIALIST

## 2024-06-26 PROCEDURE — 84145 PROCALCITONIN (PCT): CPT | Performed by: NURSE PRACTITIONER

## 2024-06-26 PROCEDURE — 94640 AIRWAY INHALATION TREATMENT: CPT

## 2024-06-26 PROCEDURE — 99900035 HC TECH TIME PER 15 MIN (STAT)

## 2024-06-26 PROCEDURE — 97116 GAIT TRAINING THERAPY: CPT

## 2024-06-26 PROCEDURE — 63600175 PHARM REV CODE 636 W HCPCS: Performed by: NURSE PRACTITIONER

## 2024-06-26 PROCEDURE — 63600175 PHARM REV CODE 636 W HCPCS: Performed by: STUDENT IN AN ORGANIZED HEALTH CARE EDUCATION/TRAINING PROGRAM

## 2024-06-26 PROCEDURE — 80048 BASIC METABOLIC PNL TOTAL CA: CPT | Performed by: NURSE PRACTITIONER

## 2024-06-26 PROCEDURE — 27000221 HC OXYGEN, UP TO 24 HOURS

## 2024-06-26 PROCEDURE — 94761 N-INVAS EAR/PLS OXIMETRY MLT: CPT

## 2024-06-26 PROCEDURE — 36415 COLL VENOUS BLD VENIPUNCTURE: CPT | Performed by: NURSE PRACTITIONER

## 2024-06-26 RX ORDER — POTASSIUM CHLORIDE 20 MEQ/1
40 TABLET, EXTENDED RELEASE ORAL ONCE
Status: COMPLETED | OUTPATIENT
Start: 2024-06-26 | End: 2024-06-26

## 2024-06-26 RX ORDER — TRAZODONE HYDROCHLORIDE 100 MG/1
200 TABLET ORAL NIGHTLY PRN
Status: DISCONTINUED | OUTPATIENT
Start: 2024-06-26 | End: 2024-06-28 | Stop reason: HOSPADM

## 2024-06-26 RX ORDER — TRAZODONE HYDROCHLORIDE 100 MG/1
200 TABLET ORAL NIGHTLY
Status: DISCONTINUED | OUTPATIENT
Start: 2024-06-26 | End: 2024-06-28 | Stop reason: HOSPADM

## 2024-06-26 RX ORDER — GUAIFENESIN 100 MG/5ML
200 SOLUTION ORAL EVERY 4 HOURS PRN
Status: DISCONTINUED | OUTPATIENT
Start: 2024-06-26 | End: 2024-06-28 | Stop reason: HOSPADM

## 2024-06-26 RX ADMIN — PANTOPRAZOLE SODIUM 40 MG: 40 TABLET, DELAYED RELEASE ORAL at 08:06

## 2024-06-26 RX ADMIN — MUPIROCIN: 20 OINTMENT TOPICAL at 08:06

## 2024-06-26 RX ADMIN — ENOXAPARIN SODIUM 40 MG: 40 INJECTION SUBCUTANEOUS at 05:06

## 2024-06-26 RX ADMIN — BUTALBITAL, ACETAMINOPHEN, AND CAFFEINE 1 TABLET: 325; 50; 40 TABLET ORAL at 08:06

## 2024-06-26 RX ADMIN — VANCOMYCIN HYDROCHLORIDE 125 MG: KIT at 05:06

## 2024-06-26 RX ADMIN — BUTALBITAL, ACETAMINOPHEN, AND CAFFEINE 1 TABLET: 325; 50; 40 TABLET ORAL at 05:06

## 2024-06-26 RX ADMIN — GUAIFENESIN 200 MG: 200 SOLUTION ORAL at 09:06

## 2024-06-26 RX ADMIN — BUDESONIDE INHALATION 0.5 MG: 0.5 SUSPENSION RESPIRATORY (INHALATION) at 07:06

## 2024-06-26 RX ADMIN — DEXTROSE MONOHYDRATE 1250 MG: 50 INJECTION, SOLUTION INTRAVENOUS at 04:06

## 2024-06-26 RX ADMIN — MUPIROCIN: 20 OINTMENT TOPICAL at 09:06

## 2024-06-26 RX ADMIN — CEFTRIAXONE 1 G: 1 INJECTION, POWDER, FOR SOLUTION INTRAMUSCULAR; INTRAVENOUS at 12:06

## 2024-06-26 RX ADMIN — OXYCODONE AND ACETAMINOPHEN 1 TABLET: 7.5; 325 TABLET ORAL at 12:06

## 2024-06-26 RX ADMIN — TRAZODONE HYDROCHLORIDE 200 MG: 100 TABLET ORAL at 11:06

## 2024-06-26 RX ADMIN — POTASSIUM CHLORIDE 40 MEQ: 1500 TABLET, EXTENDED RELEASE ORAL at 12:06

## 2024-06-26 RX ADMIN — VANCOMYCIN HYDROCHLORIDE 125 MG: KIT at 12:06

## 2024-06-26 RX ADMIN — PROMETHAZINE HYDROCHLORIDE 12.5 MG: 12.5 TABLET ORAL at 12:06

## 2024-06-26 RX ADMIN — TRAZODONE HYDROCHLORIDE 200 MG: 100 TABLET ORAL at 09:06

## 2024-06-26 RX ADMIN — ARFORMOTEROL TARTRATE 15 MCG: 15 SOLUTION RESPIRATORY (INHALATION) at 07:06

## 2024-06-26 RX ADMIN — OXYCODONE AND ACETAMINOPHEN 1 TABLET: 7.5; 325 TABLET ORAL at 04:06

## 2024-06-26 RX ADMIN — PANTOPRAZOLE SODIUM 40 MG: 40 TABLET, DELAYED RELEASE ORAL at 11:06

## 2024-06-26 RX ADMIN — AMLODIPINE BESYLATE 10 MG: 10 TABLET ORAL at 07:06

## 2024-06-26 RX ADMIN — PROMETHAZINE HYDROCHLORIDE 12.5 MG: 12.5 TABLET ORAL at 05:06

## 2024-06-26 RX ADMIN — PREDNISONE 40 MG: 20 TABLET ORAL at 08:06

## 2024-06-26 RX ADMIN — SERTRALINE HYDROCHLORIDE 100 MG: 50 TABLET ORAL at 08:06

## 2024-06-26 NOTE — PLAN OF CARE
Pt tolerated interventions well. Required SBA for bed mobility, ambulated 120ft CGA with no AD. Recommending low intensity therapy upon d/c.

## 2024-06-26 NOTE — NURSING TRANSFER
Nursing Transfer Note      6/26/2024   5:20 PM    Nurse giving handoff:YUDITH De Luna   Nurse receiving handoff:JAMAAL Simmons    Reason patient is being transferred: No longer requiring higher level of care     Transfer To: TELE    Transfer via wheelchair    Transfer with   O2    Transported by YUDITH De Luna      Telemetry: Box Number [id number]  Order for Tele Monitor? Yes    Additional Lines: Oxygen    4eyes on Skin: yes    Medicines sent: yes    Patient belongings transferred with patient: Yes    Chart send with patient: Yes    Notified: Family at bedside

## 2024-06-26 NOTE — CONSULTS
[facility] ([facility])  [facility] Medicine  Consult Note    Patient Name: Gaby Stanley  MRN: [id number]  Admission Date: 6/24/2024  Hospital Length of Stay: 1 days  Attending Physician: Mekhi Winter MD   Primary Care Provider: Lar[facility] Primary Doctor           Patient information was obtained from patient, past medical records, and ER records.     Inpatient consult to Hospitalist  Consult performed by: Mekhi Winter MD  Consult ordered by: Andre Dawkins NP        Subjective:     Principal Problem: Septic shock    Chief Complaint:   Chief Complaint   Patient presents with    Hypotension     Pt sent to er from clinic. Cough, vomiting and diarrhea began least night. Chills, SOB. Clinic staff had difficulty obtaining bp on pt.        HPI: 74 year old female with known medical issues including HTN; migraines; hypercholesterol; GERD; along with noted hospitalization 2/17/24-2/19/24) for pneumonia with GGO noted on CT Chest and has been followed up outpt for possible ILD vs scarring related to prolonged macrobid therapy, she has been treated with oral steroid therapy which stopped about 2 weeks ago and the macrobid therapy discontinued     On 6/24 she presented to clinic with c/o cough, SOB, chills, N/V/Diarrhea. Clinic noted hypotension and sent to [facility] ED  Further eval showed wbc 7.0; potassium 2.8; mag 1.2; creatinine 1.3; lactate 2.7; procal 26; negative flu, covid screens  CXR with LLL and RML opacification worsening on second film after CL placement  Treated with 3.7L IVF IV abx, 20mEq potassium, and 2g Mag  Refractory hypotension required CL placement for pressor initiation     Transferring to [facility] ICU for admission on pressor support     Hospital/ICU Course:  6/25: no new issues or c/o since pt admitted to the ICU, off pressors with stable BP, on 2L NC in NAD,  at bedside this AM - all questions answered  ----  Patient admitted to ICU for septic shock, required pressor support, now  weaned off  Stable for floor transfer. [facility] to assume care  Diarrhea noted on admission, stool studies returned +C diff antigen, toxin negative  Start on PO vancomycin, consult ID  BP elevated this afternoon, restarted home amlodipine 10 mg    Past Medical History:   Diagnosis Date    Chronic headaches     Depression     Diverticulitis     GERD (gastroesophageal reflux disease)     Hypercholesteremia     Hypertension     ILD (interstitial lung disease)     Migraine headache        Past Surgical History:   Procedure Laterality Date    ABDOMINAL SURGERY      APPENDECTOMY      CATARACT EXTRACTION      EYE SURGERY      HYSTERECTOMY      TONSILLECTOMY         Review of patient's allergies indicates:   Allergen Reactions    Amitriptyline     Amoxicillin-pot clavulanate Other (See Comments)    Sulfa (sulfonamide antibiotics)        No current facility-administered medications on file prior to encounter.     Current Outpatient Medications on File Prior to Encounter   Medication Sig    amLODIPine (NORVASC) 10 MG tablet Take 1 tablet by mouth every morning.    azelastine (ASTELIN) 137 mcg (0.1 %) nasal spray 1 spray (137 mcg total) by Nasal route 2 (two) times daily.    butalbital-acetaminophen-caffeine -40 mg (FIORICET, ESGIC) -40 mg per tablet Take 1 tablet by mouth every 6 (six) hours as needed for Pain.    cyanocobalamin 1,000 mcg/mL injection Inject 1,000 mcg into the muscle.    iron-vit c-b12-folic acid (ICAR-C PLUS) Tab Take 1 tablet by mouth every morning.    omeprazole (PRILOSEC) 10 MG capsule Take 40 mg by mouth once daily.    ondansetron (ZOFRAN) 4 MG tablet Take 4 mg by mouth every 8 (eight) hours as needed.    ondansetron (ZOFRAN-ODT) 4 MG TbDL Take 1 tablet (4 mg total) by mouth every 6 (six) hours as needed (Nausea).    promethazine (PHENERGAN) 25 MG tablet Take 1 tablet (25 mg total) by mouth every 6 (six) hours as needed for Nausea.    ROSUVASTATIN CALCIUM (CRESTOR ORAL) Take by mouth.     sertraline (ZOLOFT) 100 MG tablet Take 100 mg by mouth once daily.    SUMATRIPTAN SUCCINATE (IMITREX ORAL) Take by mouth as needed.     traZODone (DESYREL) 100 MG tablet Take 2 tablets by mouth every evening.    lipase-protease-amylase (ZENPEP) 25,000-85,000- 136,000 unit CpDR Take by mouth before meals and at bedtime as needed.    loratadine (CLARITIN) 10 mg tablet Take 10 mg by mouth once daily at 6am.    pantoprazole (PROTONIX) 40 MG tablet Take 40 mg by mouth 2 (two) times daily.     predniSONE (DELTASONE) 10 MG tablet Take 1 tablet (10 mg total) by mouth once daily.     Family History    None       Tobacco Use    Smoking status: Former    Smokeless tobacco: Never   Substance and Sexual Activity    Alcohol use: No    Drug use: No    Sexual activity: Yes     Review of Systems   All other systems reviewed and are negative.    Objective:     Vital Signs (Most Recent):  Temp: 98 °F (36.7 °C) (06/25/24 1600)  Pulse: 82 (06/25/24 1912)  Resp: (!) 22 (06/25/24 1912)  BP: 123/75 (06/25/24 1600)  SpO2: (!) 94 % (06/25/24 1912) Vital Signs (24h Range):  Temp:  [98 °F (36.7 °C)-99.4 °F (37.4 °C)] 98 °F (36.7 °C)  Pulse:  [77-96] 82  Resp:  [19-34] 22  SpO2:  [87 %-98 %] 94 %  BP: ()/(55-75) 123/75     Weight: 67.9 kg (149 lb 11.1 oz)  Body mass index is 24.91 kg/m².     Physical Exam  Vitals and nursing note reviewed.   Constitutional:       General: She is not in acute distress.     Appearance: Normal appearance. She is normal weight. She is ill-appearing.   HENT:      Head: Normocephalic and atraumatic.      Nose: Nose normal.      Mouth/Throat:      Mouth: Mucous membranes are dry.      Pharynx: Oropharynx is clear.   Eyes:      Extraocular Movements: Extraocular movements intact.      Pupils: Pupils are equal, round, and reactive to light.   Cardiovascular:      Pulses: Normal pulses.      Heart sounds: Normal heart sounds.   Pulmonary:      Effort: Pulmonary effort is normal. No respiratory distress.      Breath  sounds: No wheezing, rhonchi or rales.      Comments: On 2L NC  Decreased BS  Abdominal:      General: Abdomen is flat. Bowel sounds are normal. There is no distension.      Palpations: Abdomen is soft.      Tenderness: There is no abdominal tenderness. There is no guarding.   Neurological:      Mental Status: She is alert.          Significant Labs: All pertinent labs within the past 24 hours have been reviewed.  Recent Lab Results  (Last 5 results in the past 24 hours)        06/25/24  1232   06/25/24  1201   06/25/24  0408   06/24/24  2210   06/24/24  2152        Procalcitonin     29.03  Comment: A concentration < 0.25 ng/mL represents a low risk of bacterial   infection.  Procalcitonin may not be accurate among patients with localized   infection, recent trauma or major surgery, immunosuppressed state,   invasive fungal infection, renal dysfunction. Decisions regarding   initiation or continuation of antibiotic therapy should not be based   solely on procalcitonin levels.             Albumin     2.7           ALP     79           ALT     23           Anion Gap     7     10       AST     38           Bands         17.0       Baso #     0.05           Basophil %     0.4     0.0       Bilirubin Direct     0.2           BILIRUBIN TOTAL     0.3  Comment: For infants and newborns, interpretation of results should be based  on gestational age, weight and in agreement with clinical  observations.    Premature Infant recommended reference ranges:  Up to 24 hours.............<8.0 mg/dL  Up to 48 hours............<12.0 mg/dL  3-5 days..................<15.0 mg/dL  6-29 days.................<15.0 mg/dL             BUN     12     12       C difficile Toxins A+B, EIA Negative  Comment: Testing not recommended for children <24 months old.               C. diff Antigen Positive               Calcium     8.2     7.9       Chloride     113     113       CO2     22     19       Cortisol         20.40  Comment: Cortisol Reference  Range:  Before 10:00 am: 4.46-22.7 ug/dL  After 5:00 pm:    1.7-14.1 ug/dL         Creatinine     0.7     0.8       CRP   263.4             Differential Method     Automated     Manual  Comment: CORRECTED RESULT; previously reported as Automated on 06/24/2024 at   22:54.    [C]       eGFR     >60     >60       Eos #     0.0           Eos %     0.2     0.0       Glucose     77     81       Gran # (ANC)     9.9           Gran %     84.0     71.0       Hematocrit     26.6     27.3       Hemoglobin     8.6     8.9       Immature Grans (Abs)     0.08  Comment: Mild elevation in immature granulocytes is non specific and   can be seen in a variety of conditions including stress response,   acute inflammation, trauma and pregnancy. Correlation with other   laboratory and clinical findings is essential.       CANCELED  Comment: Mild elevation in immature granulocytes is non specific and   can be seen in a variety of conditions including stress response,   acute inflammation, trauma and pregnancy. Correlation with other   laboratory and clinical findings is essential.    Result canceled by the ancillary.         Immature Granulocytes     0.7     CANCELED  Comment: Result canceled by the ancillary.       Lactic Acid Level       1.3  Comment: Falsely low lactic acid results can be found in samples   containing >=13.0 mg/dL total bilirubin and/or >=3.5 mg/dL   direct bilirubin.           Lactate Dehydrogenase     155  Comment: Results are increased in hemolyzed samples.           Lymph #     1.0           Lymph %     8.7     6.0       Magnesium      1.9     1.8       MCH     27.8     28.8       MCHC     32.3     32.6       MCV     86     88       Metamyelocytes         4.0       Mono #     0.7           Mono %     6.0     2.0       MPV     10.4     10.6       nRBC     0     0       Phosphorus Level     2.4           Platelet Estimate     Appears normal     Appears normal       Platelet Count     142     142       Potassium      3.6     3.4       PROTEIN TOTAL     5.2           RBC     3.09     3.09       RDW     14.9     14.9       Sed Rate   35             Sodium     142     142       Troponin I     0.058  Comment: The reference interval for Troponin I represents the 99th percentile   cutoff   for our facility and is consistent with 3rd generation assay   performance.       0.218  Comment: The reference interval for Troponin I represents the 99th percentile   cutoff   for our facility and is consistent with 3rd generation assay   performance.         WBC     11.74     11.40                               [C] - Corrected Result               Significant Imaging: I have reviewed all pertinent imaging results/findings within the past 24 hours.    CT Chest Without Contrast   Final Result      Bilateral mixed pulmonary opacity and small pleural effusions         Electronically signed by: Dominique Shirley   Date:    06/24/2024   Time:    23:14      X-Ray Chest 1 View   Final Result      Satisfactory line placement radiograph         Electronically signed by: Dominique Shirley   Date:    06/24/2024   Time:    16:53      X-Ray Chest AP Portable   Final Result      As above.         Electronically signed by: Andrea Rainey   Date:    06/24/2024   Time:    12:32          Assessment/Plan:     * Septic shock  This patient has shock. The type of shock is distributive due to sepsis. The patient has the following evidence of shock: persistent hypotension  The patient was admitted to an intensive care unit  Treated with IV antibiotics, steroids, pressor support - now weaned off  Follow blood and sputum cultures, UA was normal   Continue broad spectrum IV antibiotics with vancomycin and ceftriaxone    Procal remains elevated with WBC 11K  reports treatment with multiple courses of abx and steroids since last fall   On steroids essentially since 2/2024, fungal workup pending given immunocompromised state      Blood, urine cultures pending  Diarrhea  noted on admission  C diff antigen positive, toxin negative  Results indeterminate, consult ID  On empiric treatment    Pneumonia of both lower lobes due to infectious organism  Per Pulmonary, concern for macrobid related lung injury (drug has been previously stopped), ILD, IPF, BOOP, pneumonitis, scarring from GERD  [facility] Pulm note reviewed from 6/13 that reports normal PFTs and was stated on Trelegy with cough meds    Continue broad spec abx with rocephin and vanc  Sputum culture collected, follow  On prednisone 40 mg  On 2L NC with sat of 90-91%  Echo normal Feb of this year  Procal elevated  Check fungal workup and start rheum workup  PT/OT, IS, OOB, mobilize     Diarrhea  C diff antigen positive, toxin negative  Results indeterminate, consult ID  On empiric treatment    GERD (gastroesophageal reflux disease)  Continue protonix        VTE Risk Mitigation (From admission, onward)           Ordered     enoxaparin injection 40 mg  Daily         06/24/24 2127                    Thank you for your consult. I will follow-up with patient. Please contact us if you have any additional questions.    Mekhi Winter MD  Department of Hospital Medicine   [facility] - Intensive Care ([facility])

## 2024-06-26 NOTE — ASSESSMENT & PLAN NOTE
- reports has improved this AM  - c diff antigen positive but toxin negative  -  has consulted ID for guidance

## 2024-06-26 NOTE — PLAN OF CARE
Patient awake, alert and oriented x 4  NSR on monitor  Nasal cannula 2 liters   Voids to commode with standby assist  PIV x 2   Call light within reach  No diarrhea over night

## 2024-06-26 NOTE — HPI
74 year old female with known medical issues including HTN; migraines; hypercholesterol; GERD; along with noted hospitalization 2/17/24-2/19/24) for pneumonia with GGO noted on CT Chest and has been followed up outpt for possible ILD vs scarring related to prolonged macrobid therapy, she has been treated with oral steroid therapy which stopped about 2 weeks ago and the macrobid therapy discontinued     On 6/24 she presented to clinic with c/o cough, SOB, chills, N/V/Diarrhea. Clinic noted hypotension and sent to [facility] ED  Further eval showed wbc 7.0; potassium 2.8; mag 1.2; creatinine 1.3; lactate 2.7; procal 26; negative flu, covid screens  CXR with LLL and RML opacification worsening on second film after CL placement  Treated with 3.7L IVF IV abx, 20mEq potassium, and 2g Mag  Refractory hypotension required CL placement for pressor initiation     Transferring to [facility] ICU for admission on pressor support

## 2024-06-26 NOTE — PLAN OF CARE
[facility] - Intensive Care (Hospital)  Initial Discharge Assessment       Primary Care Provider: Santos Sharma MD    Admission Diagnosis: Lactic acidosis [E87.20]  SOB (shortness of breath) [R06.02]  Encounter for central line placement [Z45.2]  Septic shock [A41.9, R65.21]  Hypotension, unspecified hypotension type [I95.9]    Admission Date: 6/24/2024  Expected Discharge Date:     Transition of Care Barriers: None    Payor: [employer] MANAGED MEDICARE / Plan: HU[employer] MEDICARE HMO / Product Type: Capitation /     Extended Emergency Contact Information  Primary Emergency Contact: Jose Stanley   [place]  Mobile Phone: [phone]  Relation: Spouse    Discharge Plan A: Home with family         [facility] - [place] - [address]  [address]  [place] [address]  Phone: [phone] Fax: [phone]    [facility] [place] [address]  [address]  [address]  [place] [address]  Phone: [phone] Fax: [phone]    [facility] - [place], [place] - [address]  [address]  [place] [address]  Phone: [phone] Fax: [phone]      Initial Assessment (most recent)       Adult Discharge Assessment - 06/26/24 1319          Discharge Assessment    Assessment Type Discharge Planning Assessment     Confirmed/corrected address, phone number and insurance Yes     Confirmed Demographics Correct on Facesheet     Source of Information patient;family     When was your last doctors appointment? 06/24/24     Communicated TAB with patient/caregiver Date not available/Unable to determine     Reason For Admission septic shock     People in Home spouse     Facility Arrived From: home     Do you expect to return to your current living situation? Yes     Do you have help at home or someone to help you manage your care at home? Yes     Who are your caregiver(s) and their phone number(s)? spouseJose     Prior to hospitilization  cognitive status: Alert/Oriented     Current cognitive status: Alert/Oriented     Walking or Climbing Stairs Difficulty no     Dressing/Bathing Difficulty no     Home Accessibility wheelchair accessible     Home Layout Able to live on 1st floor     Equipment Currently Used at Home bedside commode;walker, rolling;shower chair;cane, straight     Readmission within 30 days? No     Patient currently being followed by outpatient case management? No     Do you currently have service(s) that help you manage your care at home? No     Do you take prescription medications? Yes     Do you have prescription coverage? Yes     Coverage MCR     Do you have any problems affording any of your prescribed medications? No     Is the patient taking medications as prescribed? yes     Who is going to help you get home at discharge? spouse     How do you get to doctors appointments? car, drives self     Are you on dialysis? No     Do you take coumadin? No     Discharge Plan A Home with family     DME Needed Upon Discharge  oxygen     Discharge Plan discussed with: Patient;Spouse/sig other     Transition of Care Barriers None                   Anticipated DC dispo: home with family   Prior Level of Function: independent with ADLs   People in home:  spouseJose     Comments:  CM met with patient and spouse at bedside to introduce role and discuss discharge planning. Spouse will be help at home and can provide transport at time of discharge. Patient has DME but does not use any currently. Confirmed demographics, insurance, and emergency contacts. CM discharge needs depends on hospital progress. CM will continue following to assist with other needs.

## 2024-06-26 NOTE — ASSESSMENT & PLAN NOTE
Per Pulmonary, concern for macrobid related lung injury (drug has been previously stopped), ILD, IPF, BOOP, pneumonitis, scarring from GERD  [facility] note reviewed from 6/13 that reports normal PFTs and was stated on Trelegy with cough meds    Continue broad spec abx with rocephin and vanc  Sputum culture collected, follow  On prednisone 40 mg  On 2L NC with sat of 90-91%  Echo normal Feb of this year  Procal elevated  Check fungal workup and start rheum workup  PT/OT, IS, OOB, mobilize

## 2024-06-26 NOTE — SUBJECTIVE & OBJECTIVE
Past Medical History:   Diagnosis Date    Chronic headaches     Depression     Diverticulitis     GERD (gastroesophageal reflux disease)     Hypercholesteremia     Hypertension     ILD (interstitial lung disease)     Migraine headache        Past Surgical History:   Procedure Laterality Date    ABDOMINAL SURGERY      APPENDECTOMY      CATARACT EXTRACTION      EYE SURGERY      HYSTERECTOMY      TONSILLECTOMY         Review of patient's allergies indicates:   Allergen Reactions    Amitriptyline     Amoxicillin-pot clavulanate Other (See Comments)    Sulfa (sulfonamide antibiotics)        Medications:  Medications Prior to Admission   Medication Sig    amLODIPine (NORVASC) 10 MG tablet Take 1 tablet by mouth every morning.    azelastine (ASTELIN) 137 mcg (0.1 %) nasal spray 1 spray (137 mcg total) by Nasal route 2 (two) times daily.    butalbital-acetaminophen-caffeine -40 mg (FIORICET, ESGIC) -40 mg per tablet Take 1 tablet by mouth every 6 (six) hours as needed for Pain.    cyanocobalamin 1,000 mcg/mL injection Inject 1,000 mcg into the muscle.    iron-vit c-b12-folic acid (ICAR-C PLUS) Tab Take 1 tablet by mouth every morning.    omeprazole (PRILOSEC) 10 MG capsule Take 40 mg by mouth once daily.    ondansetron (ZOFRAN) 4 MG tablet Take 4 mg by mouth every 8 (eight) hours as needed.    ondansetron (ZOFRAN-ODT) 4 MG TbDL Take 1 tablet (4 mg total) by mouth every 6 (six) hours as needed (Nausea).    promethazine (PHENERGAN) 25 MG tablet Take 1 tablet (25 mg total) by mouth every 6 (six) hours as needed for Nausea.    ROSUVASTATIN CALCIUM (CRESTOR ORAL) Take by mouth.    sertraline (ZOLOFT) 100 MG tablet Take 100 mg by mouth once daily.    SUMATRIPTAN SUCCINATE (IMITREX ORAL) Take by mouth as needed.     traZODone (DESYREL) 100 MG tablet Take 2 tablets by mouth every evening.    [DISCONTINUED] levoFLOXacin (LEVAQUIN) 750 MG tablet Take 1 tablet (750 mg total) by mouth once daily.    lipase-protease-amylase  (ZENPEP) 25,000-85,000- 136,000 unit CpDR Take by mouth before meals and at bedtime as needed.    loratadine (CLARITIN) 10 mg tablet Take 10 mg by mouth once daily at 6am.    pantoprazole (PROTONIX) 40 MG tablet Take 40 mg by mouth 2 (two) times daily.     predniSONE (DELTASONE) 10 MG tablet Take 1 tablet (10 mg total) by mouth once daily.    [DISCONTINUED] alendronate (FOSAMAX) 70 MG tablet Take 70 mg by mouth every 7 days.     Antibiotics (From admission, onward)      Start     Stop Route Frequency Ordered    06/25/24 1600  vancomycin SolR 125 mg  (C. difficile Infection (CDI) Treatment Order Panel)         07/05/24 1759 Oral Every 6 hours 06/25/24 1426    06/25/24 1200  cefTRIAXone (Rocephin) 1 g in D5W 100 mL IVPB (MB+)         -- IV Every 24 hours (non-standard times) 06/24/24 2108 06/24/24 2130  mupirocin 2 % ointment  (DECOLONIZATION PROTOCOL ORDERS)         06/29/24 2059 Nasl 2 times daily 06/24/24 2125          Antifungals (From admission, onward)      None          Antivirals (From admission, onward)      None             Immunization History   Administered Date(s) Administered    COVID-19, MRNA, LN-S, PF (MODERNA FULL 0.5 ML DOSE) 06/24/2021, 07/22/2021    Influenza - High Dose - PF (65 years and older) 11/15/2018, 11/04/2019    Influenza - Quadrivalent - High Dose - PF (65 years and older) 10/07/2020, 11/17/2022    Pneumococcal Conjugate - 13 Valent 06/29/2018    Pneumococcal Polysaccharide - 23 Valent 08/26/2019       Family History    None       Social History     Socioeconomic History    Marital status:    Tobacco Use    Smoking status: Former    Smokeless tobacco: Never   Substance and Sexual Activity    Alcohol use: No    Drug use: No    Sexual activity: Yes     Social Determinants of Health     Financial Resource Strain: Low Risk  (6/26/2024)    Overall Financial Resource Strain (CARDIA)     Difficulty of Paying Living Expenses: Not hard at all   Food Insecurity: No Food Insecurity  (6/26/2024)    Hunger Vital Sign     Worried About Running Out of Food in the Last Year: Never true     Ran Out of Food in the Last Year: Never true   Transportation Needs: No Transportation Needs (6/26/2024)    TRANSPORTATION NEEDS     Transportation : No   Physical Activity: Inactive (9/20/2023)    [facility]    Exercise Vital Sign     Days of Exercise per Week: 0 days     Minutes of Exercise per Session: 0 min   Stress: No Stress Concern Present (6/26/2024)    [facility] [employer] - Occupational Stress Questionnaire     Feeling of Stress : Only a little   Housing Stability: Low Risk  (6/26/2024)    Housing Stability Vital Sign     Unable to Pay for Housing in the Last Year: No     Homeless in the Last Year: No     Review of Systems   Gastrointestinal:  Positive for diarrhea.   Psychiatric/Behavioral:  Positive for sleep disturbance.    All other systems reviewed and are negative.    Objective:     Vital Signs (Most Recent):  Temp: 98 °F (36.7 °C) (06/25/24 2315)  Pulse: 76 (06/26/24 0753)  Resp: 14 (06/26/24 0753)  BP: (!) 150/83 (06/26/24 0400)  SpO2: (!) 93 % (06/26/24 0753) Vital Signs (24h Range):  Temp:  [98 °F (36.7 °C)-98.9 °F (37.2 °C)] 98 °F (36.7 °C)  Pulse:  [71-95] 76  Resp:  [14-32] 14  SpO2:  [91 %-96 %] 93 %  BP: (111-159)/(54-83) 150/83     Weight: 67.9 kg (149 lb 11.1 oz)  Body mass index is 24.91 kg/m².    Estimated Creatinine Clearance: 55.5 mL/min (based on SCr of 0.8 mg/dL).     Physical Exam  Constitutional:       General: She is not in acute distress.     Appearance: Normal appearance. She is not ill-appearing.   Cardiovascular:      Rate and Rhythm: Normal rate and regular rhythm.      Pulses: Normal pulses.      Heart sounds: Normal heart sounds. No murmur heard.     No friction rub. No gallop.    Pulmonary:      Effort: Pulmonary effort is normal. No respiratory distress.      Breath sounds: Normal breath sounds.   Abdominal:      General: Abdomen is flat. Bowel sounds are normal. There is no distension.      Palpations: Abdomen is soft.      Tenderness: There is no abdominal tenderness.   Skin:     General: Skin is warm and dry.   Neurological:      Mental Status: She is alert.          Significant Labs: Blood Culture:   Recent Labs   Lab 02/16/24  2143 02/16/24  2144 06/24/24  1200 06/24/24  1202   LABBLOO No growth after 5 days. No growth after 5 days. No Growth to date  No Growth to date No Growth to date  No Growth to date     CBC:   Recent Labs   Lab 06/24/24 2152 06/25/24  0408 06/26/24  0317   WBC 11.40 11.74 12.89*   HGB 8.9* 8.6* 9.2*   HCT 27.3* 26.6* 28.4*   * 142* 154     CMP:   Recent Labs   Lab 06/24/24  1209 06/24/24 2152 06/25/24  0408 06/26/24  0317    142 142 141   K 2.8* 3.4* 3.6 3.6    113* 113* 107   CO2 24 19* 22* 24    81 77 83   BUN 12 12 12 17   CREATININE 1.3 0.8 0.7 0.8   CALCIUM 9.3 7.9* 8.2* 9.3   PROT 7.1  --  5.2* 6.1   ALBUMIN 3.8  --  2.7* 2.9*   BILITOT 0.5  --  0.3 0.3   ALKPHOS 110  --  79 85   AST 25  --  38 35   ALT 14  --  23 18   ANIONGAP 14 10 7* 10     Microbiology Results (last 7 days)       Procedure Component Value Units Date/Time    C Diff Toxin by PCR [[id number]] Collected: 06/25/24 1232    Order Status: Completed Updated: 06/26/24 0421     C. diff PCR Negative    Blood culture x two cultures. Draw prior to antibiotics. [[id number]] Collected: 06/24/24 1200    Order Status: Completed Specimen: Blood from Peripheral, Forearm, Left Updated: 06/25/24 2212     Blood Culture, Routine No Growth to date      No Growth to date    Narrative:      Aerobic and anaerobic    Blood culture x two cultures. Draw prior to antibiotics. [[id number]] Collected: 06/24/24 1202    Order Status: Completed Specimen: Blood from Peripheral, Antecubital,  Right Updated: 06/25/24 2212     Blood Culture, Routine No Growth to date      No Growth to date    Narrative:      Aerobic and anaerobic    Culture, Respiratory with Gram Stain [[id number]] Collected: 06/25/24 0849    Order Status: Sent Specimen: Respiratory from Sputum, Expectorated Updated: 06/25/24 1551    Clostridium difficile EIA [[id number]]  (Abnormal) Collected: 06/25/24 1232    Order Status: Completed Specimen: Stool Updated: 06/25/24 1318     C. diff Antigen Positive     C difficile Toxins A+B, EIA Negative     Comment: Testing not recommended for children <24 months old.             All pertinent labs within the past 24 hours have been reviewed.    Significant Imaging: I have reviewed all pertinent imaging results/findings within the past 24 hours.

## 2024-06-26 NOTE — PT/OT/SLP PROGRESS
Physical Therapy Treatment    Patient Name:  Gaby Stanley   MRN:  [id number]    Recommendations:     Discharge Recommendations: Low Intensity Therapy  Discharge Equipment Recommendations: none  Barriers to discharge: None    Assessment:     Gaby Stanley is a 74 y.o. female admitted with a medical diagnosis of Septic shock.  She presents with the following impairments/functional limitations: weakness, impaired endurance, impaired functional mobility, gait instability, impaired balance, decreased safety awareness, decreased lower extremity function, decreased coordination.    Rehab Prognosis: Good; patient would benefit from acute skilled PT services to address these deficits and reach maximum level of function.    Recent Surgery: * No surgery found *      Plan:     During this hospitalization, patient to be seen 3 x/week to address the identified rehab impairments via gait training, therapeutic activities, therapeutic exercises, neuromuscular re-education and progress toward the following goals:    Plan of Care Expires:  07/09/24    Subjective     Chief Complaint: Pt is motivated to participate  Patient/Family Comments/goals: none stated  Pain/Comfort:  Pain Rating 1: 0/10      Objective:     Communicated with nurse De Luna and [facility] chart review prior to session.  Patient found HOB elevated with peripheral IV, telemetry, pulse ox (continuous), blood pressure cuff, oxygen upon PT entry to room.     General Precautions: Standard, fall  Orthopedic Precautions: N/A  Braces: N/A  Respiratory Status: Nasal cannula, flow 2 L/min     Functional Mobility:  Gait belt applied - Yes  Bed Mobility  Rolling Right: stand by assistance  Scooting: stand by assistance  Supine to Sit: stand by assistance  Sit to Supine: stand by assistance  Transfers  Sit to Stand: contact guard assistance with no AD  Bed to Chair: pt reports just returning to bed, encouraged to sit in chair for lunch/dinner  Gait  Patient ambulated 120ft with no AD  "and contact guard assistance. Patient demonstrates occasional unsteady gait. No c/o dizziness, mild SOB, educated about pursed lip breathing technique and cued for use with mobility, improved with rest, no gross LOB. Standing rest when needed. All lines remained intact throughout ambulation trail and portable Supplemental O2 2L utilized.  Balance  Sitting: stand by assistance  Standing: contact guard assistance    Therapeutic Exercise  Patient performed 1 set(s) of 15 repetitions of the following seated exercises: ankle pumps, long arc quads, and marches for bilateral LE.   Patient required skilled PT for instruction of exercises and appropriate cues to perform exercises safely and appropriately.      AM-PAC 6 CLICK MOBILITY  Turning over in bed (including adjusting bedclothes, sheets and blankets)?: 3  Sitting down on and standing up from a chair with arms (e.g., wheelchair, bedside commode, etc.): 3  Moving from lying on back to sitting on the side of the bed?: 3  Moving to and from a bed to a chair (including a wheelchair)?: 3  Need to walk in hospital room?: 3  Climbing 3-5 steps with a railing?: 1 (NT)  Basic Mobility Total Score: 16       Treatment & Education:  Reviewed role of PT in acute care and POC. Pt tolerated interventions well. Reviewed importance of OOB activities, activity pacing, and HEP (marching/hip flex, hip abd, heel slides/LAQ, quad sets, ankle pumps) in order to maintain/regain strength. Encouraged to sit up in chair for all meals. Reviewed proper use of RW for safety and to reduce risk of falling. Reviewed "call don't fall" policy and increased risk of falling due to weakness, instructed to utilize call bell for assistance with all transfers. Pt agreeable to all requests.    Patient left HOB elevated with all lines intact, call button in reach, and family present..    GOALS:   Multidisciplinary Problems       Physical Therapy Goals          Problem: Physical Therapy    Goal Priority " Disciplines Outcome Goal Variances Interventions   Physical Therapy Goal     PT, PT/OT Progressing     Description: Pt will perform bed mobility independently in order to participate in EOB activity.  Pt will perform transfers independently in order to participate in OOB activity.  Pt will ambulate 350 ft mod I with LRAD in order to participate in daily tasks.                        Time Tracking:     PT Received On: 06/26/24  PT Start Time: 1033     PT Stop Time: 1058  PT Total Time (min): 25 min     Billable Minutes: Gait Training 15min and Therapeutic Exercise 10min    Treatment Type: Treatment  PT/PTA: PT     Number of PTA visits since last PT visit: 0     06/26/2024

## 2024-06-26 NOTE — PROGRESS NOTES
[facility] - Int[facility] ([facility])  Pulmonology  Progress Note    Patient Name: Gaby Stanley  MRN: [id number]  Admission Date: 6/24/2024  Hospital Length of Stay: 2 days  Code Status: Full Code  Attending Provider: Mekhi Winter MD  Primary Care Provider: Larissa, Primary Doctor   Principal Problem: Septic shock    Subjective:     74 year old female with known medical issues including HTN; migraines; hypercholesterol; GERD; along with noted hospitalization 2/17/24-2/19/24) for pneumonia with GGO noted on CT Chest and has been followed up outpt for possible ILD vs scarring related to prolonged macrobid therapy, she has been treated with oral steroid therapy which stopped about 2 weeks ago and the macrobid therapy discontinued    On 6/24 she presented to clinic with c/o cough, SOB, chills, N/V/Diarrhea. Clinic noted hypotension and sent to [facility] ED  Further eval showed wbc 7.0; potassium 2.8; mag 1.2; creatinine 1.3; lactate 2.7; procal 26; negative flu, covid screens  CXR with LLL and RML opacification worsening on second film after CL placement  Treated with 3.7L IVF IV abx, 20mEq potassium, and 2g Mag  Refractory hypotension required CL placement for pressor initiation    Transferring to [facility] ICU for admission on pressor support    Interval History:  6/25: no new issues or c/o since pt admitted to the ICU, off pressors with stable BP, on 2L NC in NAD,  at bedside this AM - all questions answered  6/26: no new issues, pt reports she did not sleep last night and needs her home trazodone resumed, breathing better/stable, awaiting bed outside of the ICU    ROS complete and negative unless stated in the interval HPI     Objective:     Vital Signs (Most Recent):  Temp: 98 °F (36.7 °C) (06/25/24 2315)  Pulse: 76 (06/26/24 0753)  Resp: 14 (06/26/24 0753)  BP: (!) 150/83 (06/26/24 0400)  SpO2: (!) 93 % (06/26/24 0753) Vital Signs (24h Range):  Temp:  [98 °F (36.7 °C)-98.9 °F (37.2 °C)] 98 °F (36.7 °C)  Pulse:   [71-95] 76  Resp:  [14-32] 14  SpO2:  [91 %-96 %] 93 %  BP: (111-159)/(54-83) 150/83     Weight: 67.9 kg (149 lb 11.1 oz)  Body mass index is 24.91 kg/m².      Intake/Output Summary (Last 24 hours) at 6/26/2024 0820  Last data filed at 6/25/2024 1700  Gross per 24 hour   Intake 279.41 ml   Output 450 ml   Net -170.59 ml        Physical Exam  Vitals reviewed.   Constitutional:       General: She is awake. She is not in acute distress.     Appearance: She is well-developed.   Cardiovascular:      Rate and Rhythm: Normal rate.      Pulses:           Radial pulses are 2+ on the right side and 2+ on the left side.   Pulmonary:      Effort: Pulmonary effort is normal.      Breath sounds: Decreased breath sounds present. No wheezing, rhonchi or rales.      Comments: On 2L NC  Abdominal:      General: There is no distension.      Palpations: Abdomen is soft.   Musculoskeletal:      Right lower leg: No edema.      Left lower leg: No edema.      Comments: KOLB   Skin:     General: Skin is dry.   Neurological:      General: No focal deficit present.      Mental Status: She is alert.   Psychiatric:         Behavior: Behavior is cooperative.               Vents:  Oxygen Concentration (%): 28 (06/25/24 1912)    Lines/Drains/Airways       Peripheral Intravenous Line  Duration                  Peripheral IV - Single Lumen 06/24/24 1202 18 G Right Antecubital 1 day         Peripheral IV - Single Lumen 06/24/24 1350 22 G Right Hand 1 day                    Significant Labs:    CBC/Anemia Profile:  Recent Labs   Lab 06/24/24 2152 06/25/24  0408 06/26/24  0317   WBC 11.40 11.74 12.89*   HGB 8.9* 8.6* 9.2*   HCT 27.3* 26.6* 28.4*   * 142* 154   MCV 88 86 87   RDW 14.9* 14.9* 14.9*        Chemistries:  Recent Labs   Lab 06/24/24  1209 06/24/24  1544 06/24/24 2152 06/25/24  0408 06/26/24  0317     --  142 142 141   K 2.8*  --  3.4* 3.6 3.6     --  113* 113* 107   CO2 24  --  19* 22* 24   BUN 12  --  12 12 17  "  CREATININE 1.3  --  0.8 0.7 0.8   CALCIUM 9.3  --  7.9* 8.2* 9.3   ALBUMIN 3.8  --   --  2.7* 2.9*   PROT 7.1  --   --  5.2* 6.1   BILITOT 0.5  --   --  0.3 0.3   ALKPHOS 110  --   --  79 85   ALT 14  --   --  23 18   AST 25  --   --  38 35   MG  --    < > 1.8 1.9 2.0   PHOS  --   --   --  2.4*  --     < > = values in this interval not displayed.       All pertinent labs within the past 24 hours have been reviewed.    Significant Imaging:  I have reviewed all pertinent imaging results/findings within the past 24 hours.    ABG  No results for input(s): "PH", "PO2", "PCO2", "HCO3", "BE" in the last 168 hours.  Assessment/Plan:     Pulmonary  Interstitial lung disease  - see plan for pneumonia     Pneumonia of both lower lobes due to infectious organism  - imaging reviewed; concern for macrobid related lung injury (drug has been previously stopped), ILD, IPF, BOOP, pneumonitis, scarring from GERD, other   - on broad spec abx with rocephin and vanc  - sputum culture collected, follow  - steroids, wean to lower dose as able and plan from prolonged course until she is able to f/u in clinic with Dr. Patel on July 24th at the [place]  - on 2L NC  - echo normal Feb of this year  - procal elevated but improved   - check fungal workup and rheum workup  - PT/OT, IS, OOB, mobilize   - [facility] Pulm note reviewed from 6/13 that reports normal PFTs and was stated on Trelegy with cough meds     ID  * Septic shock  This patient has shock. The type of shock is distributive due to sepsis.   The patient has the following evidence of shock: persistent hypotension.   The patient will be admitted to an intensive care unit, they will be treated with IV abx, steroid, pressor support to keep MAP > 65, and ICU hemodynamic monitoring  - remains off pressors, stop vanc based on culture data and clinical course   - ID has been consulted per HM  - blood cultures remain with NGTD, sputum in process  - procal elevated but improved   - has been on " steroids essentially since 2/2024 so will start fungal workup as likely has some degree of immunocompromised state at this juncture   - reports treatment with multiple courses of abx and steroids since last fall   - diarrhea improved, c diff toxin negative     GI  Diarrhea  - reports has improved this AM  - c diff antigen positive but toxin negative  -  has consulted ID for guidance     GERD (gastroesophageal reflux disease)  - protonix     Pulmonary team will remain available, but not plan to see daily. Please call if we can be of assistance sooner or if questions should arise.      Adnre Dawkins NP  Pulmonology  [facility] - Intensive Care ([facility])

## 2024-06-26 NOTE — SUBJECTIVE & OBJECTIVE
ROS complete and negative unless stated in the interval HPI     Objective:     Vital Signs (Most Recent):  Temp: 98 °F (36.7 °C) (06/25/24 2315)  Pulse: 76 (06/26/24 0753)  Resp: 14 (06/26/24 0753)  BP: (!) 150/83 (06/26/24 0400)  SpO2: (!) 93 % (06/26/24 0753) Vital Signs (24h Range):  Temp:  [98 °F (36.7 °C)-98.9 °F (37.2 °C)] 98 °F (36.7 °C)  Pulse:  [71-95] 76  Resp:  [14-32] 14  SpO2:  [91 %-96 %] 93 %  BP: (111-159)/(54-83) 150/83     Weight: 67.9 kg (149 lb 11.1 oz)  Body mass index is 24.91 kg/m².      Intake/Output Summary (Last 24 hours) at 6/26/2024 0820  Last data filed at 6/25/2024 1700  Gross per 24 hour   Intake 279.41 ml   Output 450 ml   Net -170.59 ml        Physical Exam  Vitals reviewed.   Constitutional:       General: She is awake. She is not in acute distress.     Appearance: She is well-developed.   Cardiovascular:      Rate and Rhythm: Normal rate.      Pulses:           Radial pulses are 2+ on the right side and 2+ on the left side.   Pulmonary:      Effort: Pulmonary effort is normal.      Breath sounds: Decreased breath sounds present. No wheezing, rhonchi or rales.      Comments: On 2L NC  Abdominal:      General: There is no distension.      Palpations: Abdomen is soft.   Musculoskeletal:      Right lower leg: No edema.      Left lower leg: No edema.      Comments: KOLB   Skin:     General: Skin is dry.   Neurological:      General: No focal deficit present.      Mental Status: She is alert.   Psychiatric:         Behavior: Behavior is cooperative.               Vents:  Oxygen Concentration (%): 28 (06/25/24 1912)    Lines/Drains/Airways       Peripheral Intravenous Line  Duration                  Peripheral IV - Single Lumen 06/24/24 1202 18 G Right Antecubital 1 day         Peripheral IV - Single Lumen 06/24/24 1350 22 G Right Hand 1 day                    Significant Labs:    CBC/Anemia Profile:  Recent Labs   Lab 06/24/24  2152 06/25/24  0408 06/26/24  0317   WBC 11.40 11.74 12.89*    HGB 8.9* 8.6* 9.2*   HCT 27.3* 26.6* 28.4*   * 142* 154   MCV 88 86 87   RDW 14.9* 14.9* 14.9*        Chemistries:  Recent Labs   Lab 06/24/24  1209 06/24/24  1544 06/24/24  2152 06/25/24  0408 06/26/24  0317     --  142 142 141   K 2.8*  --  3.4* 3.6 3.6     --  113* 113* 107   CO2 24  --  19* 22* 24   BUN 12  --  12 12 17   CREATININE 1.3  --  0.8 0.7 0.8   CALCIUM 9.3  --  7.9* 8.2* 9.3   ALBUMIN 3.8  --   --  2.7* 2.9*   PROT 7.1  --   --  5.2* 6.1   BILITOT 0.5  --   --  0.3 0.3   ALKPHOS 110  --   --  79 85   ALT 14  --   --  23 18   AST 25  --   --  38 35   MG  --    < > 1.8 1.9 2.0   PHOS  --   --   --  2.4*  --     < > = values in this interval not displayed.       All pertinent labs within the past 24 hours have been reviewed.    Significant Imaging:  I have reviewed all pertinent imaging results/findings within the past 24 hours.

## 2024-06-26 NOTE — CONSULTS
[facility] ([facility])  Infectious Disease  Consult Note    Patient Name: Gaby Stanley  MRN: [id number]  Admission Date: 6/24/2024  Hospital Length of Stay: 2 days  Attending Physician: Mekhi Winter MD  Primary Care Provider: No, Primary Doctor     Isolation Status: No active isolations    Patient information was obtained from patient, ER records, and primary team.      Consults  Assessment/Plan:     Pulmonary  Interstitial lung disease  Needs continued pulmonology follow up     Pneumonia of both lower lobes due to infectious organism  Continue empiric ceftriaxone to complete 5 day course. On 2 L NC. Monitor WBC count.     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 but does not have active infection  --Would not document this as a first time C diff episode   --However, RN reporting copious watery BM over the past 24 hrs; now improving to only 2 BM so far today per patient  --Would complete empiric course of PO vancomycin 125 mg QID for 10 days especially since patient being treated with beta lactam for pneumonia which poses risk of activating C diff toxins   --No indication for special isolation precautions   --Will schedule ID clinic follow up   --Above d/w primary team    GERD (gastroesophageal reflux disease)  Continue current medications per primary          Thank you for your consult. I will follow-up with patient. Please contact us if you have any additional questions.    Rj Rothman, DO  Infectious Disease  [facility] ([facility])    Subjective:     Principal Problem: Septic shock    HPI: This is a 75 yo F with history of HTN, GERD, and HLD hospitalized back in Feb for pneumonia and followed outpatient for suspected ILD/scarring of lungs who presented 6/24 with cough, dyspnea, and diarrhea. Was hypotensive in [place] and transferred to  for pressors. CXR with LLL and RML  opacification. Off pressors as of 6/25 and deemed stable for the medicine floor. Due to diarrhea, C diff was tested and resulted with positive antigen but negative toxins and PCR. Started empirically on PO vancomycin. Has also been on vancomycin and ceftriaxone for pneumonia coverage. Afebrile with mild leukocytosis. ID consulted for indeterminate C diff results.     Past Medical History:   Diagnosis Date    Chronic headaches     Depression     Diverticulitis     GERD (gastroesophageal reflux disease)     Hypercholesteremia     Hypertension     ILD (interstitial lung disease)     Migraine headache        Past Surgical History:   Procedure Laterality Date    ABDOMINAL SURGERY      APPENDECTOMY      CATARACT EXTRACTION      EYE SURGERY      HYSTERECTOMY      TONSILLECTOMY         Review of patient's allergies indicates:   Allergen Reactions    Amitriptyline     Amoxicillin-pot clavulanate Other (See Comments)    Sulfa (sulfonamide antibiotics)        Medications:  Medications Prior to Admission   Medication Sig    amLODIPine (NORVASC) 10 MG tablet Take 1 tablet by mouth every morning.    azelastine (ASTELIN) 137 mcg (0.1 %) nasal spray 1 spray (137 mcg total) by Nasal route 2 (two) times daily.    butalbital-acetaminophen-caffeine -40 mg (FIORICET, ESGIC) -40 mg per tablet Take 1 tablet by mouth every 6 (six) hours as needed for Pain.    cyanocobalamin 1,000 mcg/mL injection Inject 1,000 mcg into the muscle.    iron-vit c-b12-folic acid (ICAR-C PLUS) Tab Take 1 tablet by mouth every morning.    omeprazole (PRILOSEC) 10 MG capsule Take 40 mg by mouth once daily.    ondansetron (ZOFRAN) 4 MG tablet Take 4 mg by mouth every 8 (eight) hours as needed.    ondansetron (ZOFRAN-ODT) 4 MG TbDL Take 1 tablet (4 mg total) by mouth every 6 (six) hours as needed (Nausea).    promethazine (PHENERGAN) 25 MG tablet Take 1 tablet (25 mg total) by mouth every 6 (six) hours as needed for Nausea.    ROSUVASTATIN CALCIUM  (CRESTOR ORAL) Take by mouth.    sertraline (ZOLOFT) 100 MG tablet Take 100 mg by mouth once daily.    SUMATRIPTAN SUCCINATE (IMITREX ORAL) Take by mouth as needed.     traZODone (DESYREL) 100 MG tablet Take 2 tablets by mouth every evening.    [DISCONTINUED] levoFLOXacin (LEVAQUIN) 750 MG tablet Take 1 tablet (750 mg total) by mouth once daily.    lipase-protease-amylase (ZENPEP) 25,000-85,000- 136,000 unit CpDR Take by mouth before meals and at bedtime as needed.    loratadine (CLARITIN) 10 mg tablet Take 10 mg by mouth once daily at 6am.    pantoprazole (PROTONIX) 40 MG tablet Take 40 mg by mouth 2 (two) times daily.     predniSONE (DELTASONE) 10 MG tablet Take 1 tablet (10 mg total) by mouth once daily.    [DISCONTINUED] alendronate (FOSAMAX) 70 MG tablet Take 70 mg by mouth every 7 days.     Antibiotics (From admission, onward)      Start     Stop Route Frequency Ordered    06/25/24 1600  vancomycin SolR 125 mg  (C. difficile Infection (CDI) Treatment Order Panel)         07/05/24 1759 Oral Every 6 hours 06/25/24 1426    06/25/24 1200  cefTRIAXone (Rocephin) 1 g in D5W 100 mL IVPB (MB+)         -- IV Every 24 hours (non-standard times) 06/24/24 2108 06/24/24 2130  mupirocin 2 % ointment  (DECOLONIZATION PROTOCOL ORDERS)         06/29/24 2059 Nasl 2 times daily 06/24/24 2125          Antifungals (From admission, onward)      None          Antivirals (From admission, onward)      None             Immunization History   Administered Date(s) Administered    COVID-19, MRNA, LN-S, PF (MODERNA FULL 0.5 ML DOSE) 06/24/2021, 07/22/2021    Influenza - High Dose - PF (65 years and older) 11/15/2018, 11/04/2019    Influenza - Quadrivalent - High Dose - PF (65 years and older) 10/07/2020, 11/17/2022    Pneumococcal Conjugate - 13 Valent 06/29/2018    Pneumococcal Polysaccharide - 23 Valent 08/26/2019       Family History    None       Social History     Socioeconomic History    Marital status:    Tobacco Use     Smoking status: Former    Smokeless tobacco: Never   Substance and Sexual Activity    Alcohol use: No    Drug use: No    Sexual activity: Yes     Social Determinants of Health     Financial Resource Strain: Low Risk  (6/26/2024)    Overall Financial Resource Strain (CARDIA)     Difficulty of Paying Living Expenses: Not hard at all   Food Insecurity: No Food Insecurity (6/26/2024)    Hunger Vital Sign     Worried About Running Out of Food in the Last Year: Never true     Ran Out of Food in the Last Year: Never true   Transportation Needs: No Transportation Needs (6/26/2024)    TRANSPORTATION NEEDS     Transportation : No   Physical Activity: Inactive (9/20/2023)    Received from [facility] and Its Subsidiaries and Affiliates, [facility] Subsidi[facility] and Affiliates    Exercise Vital Sign     Days of Exercise per Week: 0 days     Minutes of Exercise per Session: 0 min   Stress: No Stress Concern Present (6/26/2024)    [employer] - Occupational Stress Questionnaire     Feeling of Stress : Only a little   Housing Stability: Low Risk  (6/26/2024)    Housing Stability Vital Sign     Unable to Pay for Housing in the Last Year: No     Homeless in the Last Year: No     Review of Systems   Gastrointestinal:  Positive for diarrhea.   Psychiatric/Behavioral:  Positive for sleep disturbance.    All other systems reviewed and are negative.    Objective:     Vital Signs (Most Recent):  Temp: 98 °F (36.7 °C) (06/25/24 2315)  Pulse: 76 (06/26/24 0753)  Resp: 14 (06/26/24 0753)  BP: (!) 150/83 (06/26/24 0400)  SpO2: (!) 93 % (06/26/24 0753) Vital Signs (24h Range):  Temp:  [98 °F (36.7 °C)-98.9 °F (37.2 °C)] 98 °F (36.7 °C)  Pulse:  [71-95] 76  Resp:  [14-32] 14  SpO2:  [91 %-96 %] 93 %  BP: (111-159)/(54-83) 150/83     Weight: 67.9 kg (149 lb 11.1 oz)  Body mass index is 24.91 kg/m².    Estimated Creatinine Clearance:  55.5 mL/min (based on SCr of 0.8 mg/dL).     Physical Exam  Constitutional:       General: She is not in acute distress.     Appearance: Normal appearance. She is not ill-appearing.   Cardiovascular:      Rate and Rhythm: Normal rate and regular rhythm.      Pulses: Normal pulses.      Heart sounds: Normal heart sounds. No murmur heard.     No friction rub. No gallop.   Pulmonary:      Effort: Pulmonary effort is normal. No respiratory distress.      Breath sounds: Normal breath sounds.   Abdominal:      General: Abdomen is flat. Bowel sounds are normal. There is no distension.      Palpations: Abdomen is soft.      Tenderness: There is no abdominal tenderness.   Skin:     General: Skin is warm and dry.   Neurological:      Mental Status: She is alert.          Significant Labs: Blood Culture:   Recent Labs   Lab 02/16/24  2143 02/16/24  2144 06/24/24  1200 06/24/24  1202   LABBLOO No growth after 5 days. No growth after 5 days. No Growth to date  No Growth to date No Growth to date  No Growth to date     CBC:   Recent Labs   Lab 06/24/24 2152 06/25/24  0408 06/26/24  0317   WBC 11.40 11.74 12.89*   HGB 8.9* 8.6* 9.2*   HCT 27.3* 26.6* 28.4*   * 142* 154     CMP:   Recent Labs   Lab 06/24/24  1209 06/24/24 2152 06/25/24  0408 06/26/24  0317    142 142 141   K 2.8* 3.4* 3.6 3.6    113* 113* 107   CO2 24 19* 22* 24    81 77 83   BUN 12 12 12 17   CREATININE 1.3 0.8 0.7 0.8   CALCIUM 9.3 7.9* 8.2* 9.3   PROT 7.1  --  5.2* 6.1   ALBUMIN 3.8  --  2.7* 2.9*   BILITOT 0.5  --  0.3 0.3   ALKPHOS 110  --  79 85   AST 25  --  38 35   ALT 14  --  23 18   ANIONGAP 14 10 7* 10     Microbiology Results (last 7 days)       Procedure Component Value Units Date/Time    C Diff Toxin by PCR [[id number]] Collected: 06/25/24 1232    Order Status: Completed Updated: 06/26/24 0421     C. diff PCR Negative    Blood culture x two cultures. Draw prior to antibiotics. [[id number]] Collected: 06/24/24 1200     Order Status: Completed Specimen: Blood from Peripheral, Forearm, Left Updated: 06/25/24 2212     Blood Culture, Routine No Growth to date      No Growth to date    Narrative:      Aerobic and anaerobic    Blood culture x two cultures. Draw prior to antibiotics. [[id number]] Collected: 06/24/24 1202    Order Status: Completed Specimen: Blood from Peripheral, Antecubital, Right Updated: 06/25/24 2212     Blood Culture, Routine No Growth to date      No Growth to date    Narrative:      Aerobic and anaerobic    Culture, Respiratory with Gram Stain [[id number]] Collected: 06/25/24 0849    Order Status: Sent Specimen: Respiratory from Sputum, Expectorated Updated: 06/25/24 1551    Clostridium difficile EIA [[id number]]  (Abnormal) Collected: 06/25/24 1232    Order Status: Completed Specimen: Stool Updated: 06/25/24 1318     C. diff Antigen Positive     C difficile Toxins A+B, EIA Negative     Comment: Testing not recommended for children <24 months old.             All pertinent labs within the past 24 hours have been reviewed.    Significant Imaging: I have reviewed all pertinent imaging results/findings within the past 24 hours.

## 2024-06-26 NOTE — ASSESSMENT & PLAN NOTE
This patient has shock. The type of shock is distributive due to sepsis. The patient has the following evidence of shock: persistent hypotension  The patient was admitted to an intensive care unit  Treated with IV antibiotics, steroids, pressor support - now weaned off  Follow blood and sputum cultures, UA was normal   Continue broad spectrum IV antibiotics with vancomycin and ceftriaxone    Procal remains elevated with WBC 11K  reports treatment with multiple courses of abx and steroids since last fall   On steroids essentially since 2/2024, fungal workup pending given immunocompromised state      Blood, urine cultures pending  Diarrhea noted on admission  C diff antigen positive, toxin negative  Results indeterminate, consult ID  On empiric treatment

## 2024-06-26 NOTE — PROGRESS NOTES
Therapy with vancomycin complete and/or consult discontinued by provider.  Pharmacy will sign off, please re-consult as needed.    Thank you for allowing us to participate in this patient's care.   Katherine McArdle, Pharm.D. 6/26/2024 8:17 AM

## 2024-06-26 NOTE — NURSING
Ms. Stanley tested positive for C. Diff antigen, but her PCR was negative.  I have removed special contact precautions since C. Diff toxin was negative.  Please adhere to standard precautions when caring for this patient.

## 2024-06-26 NOTE — HPI
This is a 75 yo F with history of HTN, GERD, and HLD hospitalized back in Feb for pneumonia and followed outpatient for suspected ILD/scarring of lungs who presented 6/24 with cough, dyspnea, and diarrhea. Was hypotensive in [place] and transferred to  for pressors. CXR with LLL and RML opacification. Off pressors as of 6/25 and deemed stable for the medicine floor. Due to diarrhea, C diff was tested and resulted with positive antigen but negative toxins and PCR. Started empirically on PO vancomycin. Has also been on vancomycin and ceftriaxone for pneumonia coverage. Afebrile with mild leukocytosis. ID consulted for indeterminate C diff results.

## 2024-06-26 NOTE — SUBJECTIVE & OBJECTIVE
Past Medical History:   Diagnosis Date    Chronic headaches     Depression     Diverticulitis     GERD (gastroesophageal reflux disease)     Hypercholesteremia     Hypertension     ILD (interstitial lung disease)     Migraine headache        Past Surgical History:   Procedure Laterality Date    ABDOMINAL SURGERY      APPENDECTOMY      CATARACT EXTRACTION      EYE SURGERY      HYSTERECTOMY      TONSILLECTOMY         Review of patient's allergies indicates:   Allergen Reactions    Amitriptyline     Amoxicillin-pot clavulanate Other (See Comments)    Sulfa (sulfonamide antibiotics)        No current facility-administered medications on file prior to encounter.     Current Outpatient Medications on File Prior to Encounter   Medication Sig    amLODIPine (NORVASC) 10 MG tablet Take 1 tablet by mouth every morning.    azelastine (ASTELIN) 137 mcg (0.1 %) nasal spray 1 spray (137 mcg total) by Nasal route 2 (two) times daily.    butalbital-acetaminophen-caffeine -40 mg (FIORICET, ESGIC) -40 mg per tablet Take 1 tablet by mouth every 6 (six) hours as needed for Pain.    cyanocobalamin 1,000 mcg/mL injection Inject 1,000 mcg into the muscle.    iron-vit c-b12-folic acid (ICAR-C PLUS) Tab Take 1 tablet by mouth every morning.    omeprazole (PRILOSEC) 10 MG capsule Take 40 mg by mouth once daily.    ondansetron (ZOFRAN) 4 MG tablet Take 4 mg by mouth every 8 (eight) hours as needed.    ondansetron (ZOFRAN-ODT) 4 MG TbDL Take 1 tablet (4 mg total) by mouth every 6 (six) hours as needed (Nausea).    promethazine (PHENERGAN) 25 MG tablet Take 1 tablet (25 mg total) by mouth every 6 (six) hours as needed for Nausea.    ROSUVASTATIN CALCIUM (CRESTOR ORAL) Take by mouth.    sertraline (ZOLOFT) 100 MG tablet Take 100 mg by mouth once daily.    SUMATRIPTAN SUCCINATE (IMITREX ORAL) Take by mouth as needed.     traZODone (DESYREL) 100 MG tablet Take 2 tablets by mouth every evening.    lipase-protease-amylase (ZENPEP)  25,000-85,000- 136,000 unit CpDR Take by mouth before meals and at bedtime as needed.    loratadine (CLARITIN) 10 mg tablet Take 10 mg by mouth once daily at 6am.    pantoprazole (PROTONIX) 40 MG tablet Take 40 mg by mouth 2 (two) times daily.     predniSONE (DELTASONE) 10 MG tablet Take 1 tablet (10 mg total) by mouth once daily.     Family History    None       Tobacco Use    Smoking status: Former    Smokeless tobacco: Never   Substance and Sexual Activity    Alcohol use: No    Drug use: No    Sexual activity: Yes     Review of Systems   All other systems reviewed and are negative.    Objective:     Vital Signs (Most Recent):  Temp: 98 °F (36.7 °C) (06/25/24 1600)  Pulse: 82 (06/25/24 1912)  Resp: (!) 22 (06/25/24 1912)  BP: 123/75 (06/25/24 1600)  SpO2: (!) 94 % (06/25/24 1912) Vital Signs (24h Range):  Temp:  [98 °F (36.7 °C)-99.4 °F (37.4 °C)] 98 °F (36.7 °C)  Pulse:  [77-96] 82  Resp:  [19-34] 22  SpO2:  [87 %-98 %] 94 %  BP: ()/(55-75) 123/75     Weight: 67.9 kg (149 lb 11.1 oz)  Body mass index is 24.91 kg/m².     Physical Exam  Vitals and nursing note reviewed.   Constitutional:       General: She is not in acute distress.     Appearance: Normal appearance. She is normal weight. She is ill-appearing.   HENT:      Head: Normocephalic and atraumatic.      Nose: Nose normal.      Mouth/Throat:      Mouth: Mucous membranes are dry.      Pharynx: Oropharynx is clear.   Eyes:      Extraocular Movements: Extraocular movements intact.      Pupils: Pupils are equal, round, and reactive to light.   Cardiovascular:      Pulses: Normal pulses.      Heart sounds: Normal heart sounds.   Pulmonary:      Effort: Pulmonary effort is normal. No respiratory distress.      Breath sounds: No wheezing, rhonchi or rales.      Comments: On 2L NC  Decreased BS  Abdominal:      General: Abdomen is flat. Bowel sounds are normal. There is no distension.      Palpations: Abdomen is soft.      Tenderness: There is no abdominal  tenderness. There is no guarding.   Neurological:      Mental Status: She is alert.          Significant Labs: All pertinent labs within the past 24 hours have been reviewed.  Recent Lab Results  (Last 5 results in the past 24 hours)        06/25/24  1232   06/25/24  1201   06/25/24  0408   06/24/24  2210   06/24/24  2152        Procalcitonin     29.03  Comment: A concentration < 0.25 ng/mL represents a low risk of bacterial   infection.  Procalcitonin may not be accurate among patients with localized   infection, recent trauma or major surgery, immunosuppressed state,   invasive fungal infection, renal dysfunction. Decisions regarding   initiation or continuation of antibiotic therapy should not be based   solely on procalcitonin levels.             Albumin     2.7           ALP     79           ALT     23           Anion Gap     7     10       AST     38           Bands         17.0       Baso #     0.05           Basophil %     0.4     0.0       Bilirubin Direct     0.2           BILIRUBIN TOTAL     0.3  Comment: For infants and newborns, interpretation of results should be based  on gestational age, weight and in agreement with clinical  observations.    Premature Infant recommended reference ranges:  Up to 24 hours.............<8.0 mg/dL  Up to 48 hours............<12.0 mg/dL  3-5 days..................<15.0 mg/dL  6-29 days.................<15.0 mg/dL             BUN     12     12       C difficile Toxins A+B, EIA Negative  Comment: Testing not recommended for children <24 months old.               C. diff Antigen Positive               Calcium     8.2     7.9       Chloride     113     113       CO2     22     19       Cortisol         20.40  Comment: Cortisol Reference Range:  Before 10:00 am: 4.46-22.7 ug/dL  After 5:00 pm:    1.7-14.1 ug/dL         Creatinine     0.7     0.8       CRP   263.4             Differential Method     Automated     Manual  Comment: CORRECTED RESULT; previously reported as  Automated on 06/24/2024 at   22:54.    [C]       eGFR     >60     >60       Eos #     0.0           Eos %     0.2     0.0       Glucose     77     81       Gran # (ANC)     9.9           Gran %     84.0     71.0       Hematocrit     26.6     27.3       Hemoglobin     8.6     8.9       Immature Grans (Abs)     0.08  Comment: Mild elevation in immature granulocytes is non specific and   can be seen in a variety of conditions including stress response,   acute inflammation, trauma and pregnancy. Correlation with other   laboratory and clinical findings is essential.       CANCELED  Comment: Mild elevation in immature granulocytes is non specific and   can be seen in a variety of conditions including stress response,   acute inflammation, trauma and pregnancy. Correlation with other   laboratory and clinical findings is essential.    Result canceled by the ancillary.         Immature Granulocytes     0.7     CANCELED  Comment: Result canceled by the ancillary.       Lactic Acid Level       1.3  Comment: Falsely low lactic acid results can be found in samples   containing >=13.0 mg/dL total bilirubin and/or >=3.5 mg/dL   direct bilirubin.           Lactate Dehydrogenase     155  Comment: Results are increased in hemolyzed samples.           Lymph #     1.0           Lymph %     8.7     6.0       Magnesium      1.9     1.8       MCH     27.8     28.8       MCHC     32.3     32.6       MCV     86     88       Metamyelocytes         4.0       Mono #     0.7           Mono %     6.0     2.0       MPV     10.4     10.6       nRBC     0     0       Phosphorus Level     2.4           Platelet Estimate     Appears normal     Appears normal       Platelet Count     142     142       Potassium     3.6     3.4       PROTEIN TOTAL     5.2           RBC     3.09     3.09       RDW     14.9     14.9       Sed Rate   35             Sodium     142     142       Troponin I     0.058  Comment: The reference interval for Troponin I  represents the 99th percentile   cutoff   for our facility and is consistent with 3rd generation assay   performance.       0.218  Comment: The reference interval for Troponin I represents the 99th percentile   cutoff   for our facility and is consistent with 3rd generation assay   performance.         WBC     11.74     11.40                               [C] - Corrected Result               Significant Imaging: I have reviewed all pertinent imaging results/findings within the past 24 hours.    CT Chest Without Contrast   Final Result      Bilateral mixed pulmonary opacity and small pleural effusions         Electronically signed by: Dominique Shirley   Date:    06/24/2024   Time:    23:14      X-Ray Chest 1 View   Final Result      Satisfactory line placement radiograph         Electronically signed by: Dominique Shirley   Date:    06/24/2024   Time:    16:53      X-Ray Chest AP Portable   Final Result      As above.         Electronically signed by: Andrea Rainey   Date:    06/24/2024   Time:    12:32

## 2024-06-26 NOTE — ASSESSMENT & PLAN NOTE
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

## 2024-06-27 LAB
1,3 BETA GLUCAN SER-MCNC: 68 PG/ML
ALBUMIN SERPL BCP-MCNC: 2.7 G/DL (ref 3.5–5.2)
ALP SERPL-CCNC: 77 U/L (ref 55–135)
ALT SERPL W/O P-5'-P-CCNC: 17 U/L (ref 10–44)
ANION GAP SERPL CALC-SCNC: 11 MMOL/L (ref 8–16)
AST SERPL-CCNC: 22 U/L (ref 10–40)
BACTERIA SPEC AEROBE CULT: NORMAL
BACTERIA SPEC AEROBE CULT: NORMAL
BASOPHILS # BLD AUTO: 0.02 K/UL (ref 0–0.2)
BASOPHILS NFR BLD: 0.2 % (ref 0–1.9)
BILIRUB DIRECT SERPL-MCNC: 0.1 MG/DL (ref 0.1–0.3)
BILIRUB SERPL-MCNC: 0.3 MG/DL (ref 0.1–1)
BUN SERPL-MCNC: 9 MG/DL (ref 8–23)
CALCIUM SERPL-MCNC: 9.1 MG/DL (ref 8.7–10.5)
CHLORIDE SERPL-SCNC: 107 MMOL/L (ref 95–110)
CO2 SERPL-SCNC: 24 MMOL/L (ref 23–29)
CREAT SERPL-MCNC: 0.7 MG/DL (ref 0.5–1.4)
DIFFERENTIAL METHOD BLD: ABNORMAL
EOSINOPHIL # BLD AUTO: 0.1 K/UL (ref 0–0.5)
EOSINOPHIL NFR BLD: 0.9 % (ref 0–8)
ERYTHROCYTE [DISTWIDTH] IN BLOOD BY AUTOMATED COUNT: 15.1 % (ref 11.5–14.5)
EST. GFR  (NO RACE VARIABLE): >60 ML/MIN/1.73 M^2
FUNGITELL COMMENTS: ABNORMAL
GLUCOSE SERPL-MCNC: 87 MG/DL (ref 70–110)
GRAM STN SPEC: NORMAL
HCT VFR BLD AUTO: 26.7 % (ref 37–48.5)
HGB BLD-MCNC: 8.7 G/DL (ref 12–16)
IMM GRANULOCYTES # BLD AUTO: 0.11 K/UL (ref 0–0.04)
IMM GRANULOCYTES NFR BLD AUTO: 1.3 % (ref 0–0.5)
LYMPHOCYTES # BLD AUTO: 1.1 K/UL (ref 1–4.8)
LYMPHOCYTES NFR BLD: 12.6 % (ref 18–48)
MAGNESIUM SERPL-MCNC: 1.5 MG/DL (ref 1.6–2.6)
MCH RBC QN AUTO: 28.3 PG (ref 27–31)
MCHC RBC AUTO-ENTMCNC: 32.6 G/DL (ref 32–36)
MCV RBC AUTO: 87 FL (ref 82–98)
MONOCYTES # BLD AUTO: 0.7 K/UL (ref 0.3–1)
MONOCYTES NFR BLD: 8.2 % (ref 4–15)
NEUTROPHILS # BLD AUTO: 6.6 K/UL (ref 1.8–7.7)
NEUTROPHILS NFR BLD: 76.8 % (ref 38–73)
NRBC BLD-RTO: 0 /100 WBC
PLATELET # BLD AUTO: 160 K/UL (ref 150–450)
PMV BLD AUTO: 11.2 FL (ref 9.2–12.9)
POTASSIUM SERPL-SCNC: 3.4 MMOL/L (ref 3.5–5.1)
PROCALCITONIN SERPL IA-MCNC: 10.39 NG/ML
PROT SERPL-MCNC: 5.8 G/DL (ref 6–8.4)
RBC # BLD AUTO: 3.07 M/UL (ref 4–5.4)
SODIUM SERPL-SCNC: 142 MMOL/L (ref 136–145)
WBC # BLD AUTO: 8.64 K/UL (ref 3.9–12.7)

## 2024-06-27 PROCEDURE — 25000242 PHARM REV CODE 250 ALT 637 W/ HCPCS: Performed by: NURSE PRACTITIONER

## 2024-06-27 PROCEDURE — 94640 AIRWAY INHALATION TREATMENT: CPT

## 2024-06-27 PROCEDURE — 80048 BASIC METABOLIC PNL TOTAL CA: CPT | Performed by: NURSE PRACTITIONER

## 2024-06-27 PROCEDURE — 97530 THERAPEUTIC ACTIVITIES: CPT | Mod: CQ

## 2024-06-27 PROCEDURE — 25000003 PHARM REV CODE 250: Performed by: NURSE PRACTITIONER

## 2024-06-27 PROCEDURE — 63600175 PHARM REV CODE 636 W HCPCS: Performed by: NURSE PRACTITIONER

## 2024-06-27 PROCEDURE — 21400001 HC TELEMETRY ROOM

## 2024-06-27 PROCEDURE — 97535 SELF CARE MNGMENT TRAINING: CPT

## 2024-06-27 PROCEDURE — 36415 COLL VENOUS BLD VENIPUNCTURE: CPT | Performed by: NURSE PRACTITIONER

## 2024-06-27 PROCEDURE — 97530 THERAPEUTIC ACTIVITIES: CPT

## 2024-06-27 PROCEDURE — 25000003 PHARM REV CODE 250: Performed by: HOSPITALIST

## 2024-06-27 PROCEDURE — 97116 GAIT TRAINING THERAPY: CPT | Mod: CQ

## 2024-06-27 PROCEDURE — 80076 HEPATIC FUNCTION PANEL: CPT | Performed by: NURSE PRACTITIONER

## 2024-06-27 PROCEDURE — 63600175 PHARM REV CODE 636 W HCPCS: Performed by: HOSPITALIST

## 2024-06-27 PROCEDURE — 83735 ASSAY OF MAGNESIUM: CPT | Performed by: NURSE PRACTITIONER

## 2024-06-27 PROCEDURE — 94761 N-INVAS EAR/PLS OXIMETRY MLT: CPT

## 2024-06-27 PROCEDURE — 63600175 PHARM REV CODE 636 W HCPCS: Performed by: STUDENT IN AN ORGANIZED HEALTH CARE EDUCATION/TRAINING PROGRAM

## 2024-06-27 PROCEDURE — 85025 COMPLETE CBC W/AUTO DIFF WBC: CPT | Performed by: NURSE PRACTITIONER

## 2024-06-27 PROCEDURE — 84145 PROCALCITONIN (PCT): CPT | Performed by: NURSE PRACTITIONER

## 2024-06-27 PROCEDURE — 27000221 HC OXYGEN, UP TO 24 HOURS

## 2024-06-27 PROCEDURE — 25000003 PHARM REV CODE 250: Performed by: STUDENT IN AN ORGANIZED HEALTH CARE EDUCATION/TRAINING PROGRAM

## 2024-06-27 PROCEDURE — 99900035 HC TECH TIME PER 15 MIN (STAT)

## 2024-06-27 RX ORDER — SODIUM CHLORIDE 9 MG/ML
INJECTION, SOLUTION INTRAVENOUS
Status: DISCONTINUED | OUTPATIENT
Start: 2024-06-27 | End: 2024-06-28 | Stop reason: HOSPADM

## 2024-06-27 RX ORDER — POTASSIUM CHLORIDE 750 MG/1
30 TABLET, EXTENDED RELEASE ORAL ONCE
Status: COMPLETED | OUTPATIENT
Start: 2024-06-27 | End: 2024-06-27

## 2024-06-27 RX ORDER — MAGNESIUM SULFATE HEPTAHYDRATE 40 MG/ML
2 INJECTION, SOLUTION INTRAVENOUS ONCE
Status: COMPLETED | OUTPATIENT
Start: 2024-06-27 | End: 2024-06-27

## 2024-06-27 RX ADMIN — MUPIROCIN: 20 OINTMENT TOPICAL at 08:06

## 2024-06-27 RX ADMIN — PANTOPRAZOLE SODIUM 40 MG: 40 TABLET, DELAYED RELEASE ORAL at 08:06

## 2024-06-27 RX ADMIN — AMLODIPINE BESYLATE 10 MG: 10 TABLET ORAL at 06:06

## 2024-06-27 RX ADMIN — ONDANSETRON 8 MG: 2 INJECTION INTRAMUSCULAR; INTRAVENOUS at 03:06

## 2024-06-27 RX ADMIN — OXYCODONE AND ACETAMINOPHEN 1 TABLET: 7.5; 325 TABLET ORAL at 08:06

## 2024-06-27 RX ADMIN — SERTRALINE HYDROCHLORIDE 100 MG: 50 TABLET ORAL at 08:06

## 2024-06-27 RX ADMIN — MAGNESIUM SULFATE HEPTAHYDRATE 2 G: 40 INJECTION, SOLUTION INTRAVENOUS at 10:06

## 2024-06-27 RX ADMIN — GUAIFENESIN 200 MG: 200 SOLUTION ORAL at 03:06

## 2024-06-27 RX ADMIN — PREDNISONE 40 MG: 20 TABLET ORAL at 08:06

## 2024-06-27 RX ADMIN — ENOXAPARIN SODIUM 40 MG: 40 INJECTION SUBCUTANEOUS at 05:06

## 2024-06-27 RX ADMIN — VANCOMYCIN HYDROCHLORIDE 125 MG: KIT at 05:06

## 2024-06-27 RX ADMIN — TRAZODONE HYDROCHLORIDE 200 MG: 100 TABLET ORAL at 08:06

## 2024-06-27 RX ADMIN — OXYCODONE AND ACETAMINOPHEN 1 TABLET: 7.5; 325 TABLET ORAL at 05:06

## 2024-06-27 RX ADMIN — POTASSIUM CHLORIDE 30 MEQ: 750 TABLET, EXTENDED RELEASE ORAL at 10:06

## 2024-06-27 RX ADMIN — VANCOMYCIN HYDROCHLORIDE 125 MG: KIT at 01:06

## 2024-06-27 RX ADMIN — OXYCODONE AND ACETAMINOPHEN 1 TABLET: 7.5; 325 TABLET ORAL at 01:06

## 2024-06-27 RX ADMIN — ARFORMOTEROL TARTRATE 15 MCG: 15 SOLUTION RESPIRATORY (INHALATION) at 07:06

## 2024-06-27 RX ADMIN — BUDESONIDE INHALATION 0.5 MG: 0.5 SUSPENSION RESPIRATORY (INHALATION) at 07:06

## 2024-06-27 RX ADMIN — GUAIFENESIN 200 MG: 200 SOLUTION ORAL at 08:06

## 2024-06-27 RX ADMIN — CEFTRIAXONE 1 G: 1 INJECTION, POWDER, FOR SOLUTION INTRAMUSCULAR; INTRAVENOUS at 02:06

## 2024-06-27 RX ADMIN — VANCOMYCIN HYDROCHLORIDE 125 MG: KIT at 11:06

## 2024-06-27 RX ADMIN — VANCOMYCIN HYDROCHLORIDE 125 MG: KIT at 02:06

## 2024-06-27 NOTE — SUBJECTIVE & OBJECTIVE
Review of Systems   All other systems reviewed and are negative.    Objective:     Vital Signs (Most Recent):  Temp: 99.1 °F (37.3 °C) (06/26/24 1923)  Pulse: 95 (06/26/24 2050)  Resp: 18 (06/26/24 1941)  BP: 132/76 (06/26/24 1923)  SpO2: 96 % (06/26/24 1941) Vital Signs (24h Range):  Temp:  [97.5 °F (36.4 °C)-99.1 °F (37.3 °C)] 99.1 °F (37.3 °C)  Pulse:  [71-95] 95  Resp:  [14-59] 18  SpO2:  [86 %-98 %] 96 %  BP: (111-182)/(54-83) 132/76     Weight: 66.2 kg (145 lb 15.1 oz)  Body mass index is 24.29 kg/m².    Intake/Output Summary (Last 24 hours) at 6/26/2024 2155  Last data filed at 6/26/2024 1300  Gross per 24 hour   Intake 346.84 ml   Output --   Net 346.84 ml         Physical Exam  Vitals and nursing note reviewed.   Constitutional:       General: She is not in acute distress.     Appearance: Normal appearance. She is normal weight. She is ill-appearing.   Cardiovascular:      Rate and Rhythm: Regular rhythm. Tachycardia present.      Heart sounds: No murmur heard.  Pulmonary:      Effort: Pulmonary effort is normal. No respiratory distress.      Breath sounds: No wheezing.      Comments: 2L NC  Neurological:      General: No focal deficit present.      Mental Status: She is alert and oriented to person, place, and time.   Psychiatric:         Mood and Affect: Mood normal.         Behavior: Behavior normal.             Significant Labs: All pertinent labs within the past 24 hours have been reviewed.  Recent Lab Results         06/26/24  0317        Procalcitonin 20.19  Comment: A concentration < 0.25 ng/mL represents a low risk of bacterial   infection.  Procalcitonin may not be accurate among patients with localized   infection, recent trauma or major surgery, immunosuppressed state,   invasive fungal infection, renal dysfunction. Decisions regarding   initiation or continuation of antibiotic therapy should not be based   solely on procalcitonin levels.         Albumin 2.9       ALP 85       ALT 18        Anion Gap 10       AST 35       Bands 16.0       Basophil % 0.0       Bilirubin Direct 0.1       BILIRUBIN TOTAL 0.3  Comment: For infants and newborns, interpretation of results should be based  on gestational age, weight and in agreement with clinical  observations.    Premature Infant recommended reference ranges:  Up to 24 hours.............<8.0 mg/dL  Up to 48 hours............<12.0 mg/dL  3-5 days..................<15.0 mg/dL  6-29 days.................<15.0 mg/dL         BUN 17       Calcium 9.3       Chloride 107       CO2 24       Creatinine 0.8       Differential Method Manual  Comment: CORRECTED RESULT; previously reported as Automated on 06/26/2024 at   04:14.    [C]       eGFR >60       Eos % 2.0       Glucose 83       Gran % 70.0       Hematocrit 28.4       Hemoglobin 9.2       Immature Grans (Abs) CANCELED  Comment: Mild elevation in immature granulocytes is non specific and   can be seen in a variety of conditions including stress response,   acute inflammation, trauma and pregnancy. Correlation with other   laboratory and clinical findings is essential.    Result canceled by the ancillary.         Immature Granulocytes CANCELED  Comment: Result canceled by the ancillary.       Lymph % 6.0       Magnesium  2.0       MCH 28.3       MCHC 32.4       MCV 87       Mono % 6.0       MPV 10.9       nRBC 0       Ovalocytes Occasional       Platelet Estimate Appears normal       Platelet Count 154       Potassium 3.6       PROTEIN TOTAL 6.1       RBC 3.25       RDW 14.9       Sodium 141       WBC 12.89                [C] - Corrected Result               Significant Imaging: I have reviewed all pertinent imaging results/findings within the past 24 hours.    CT Chest Without Contrast   Final Result      Bilateral mixed pulmonary opacity and small pleural effusions         Electronically signed by: Dominique Shirley   Date:    06/24/2024   Time:    23:14      X-Ray Chest 1 View   Final Result      Satisfactory  line placement radiograph         Electronically signed by: Dominique Shirley   Date:    06/24/2024   Time:    16:53      X-Ray Chest AP Portable   Final Result      As above.         Electronically signed by: Andrea Rainey   Date:    06/24/2024   Time:    12:32

## 2024-06-27 NOTE — HOSPITAL COURSE
6/25: no new issues or c/o since pt admitted to the ICU, off pressors with stable BP, on 2L NC in NAD,  at bedside this AM - all questions answered  ----  Patient admitted to ICU for septic shock, required pressor support, now weaned off  Stable for floor transfer. HMS to assume care  Diarrhea noted on admission, stool studies returned +C diff antigen, toxin negative  Start on PO vancomycin, consult ID  BP elevated this afternoon, restarted home amlodipine 10 mg    6/26  NAEON, stable on 2L NC  On ceftriaxone for pneumonia, day # 3  Continue Prednisone 40 mg, plan for prolonged taper on discharge  Pulmonary following, may need bronch if no improving  ID following, discussed with Dr. Rothman  On PO vancomycin for empiric coverage; diarrhea improving    6/27  NAEON, weaned to 1L NC  No further diarrhea  Ambulating with PT/OT  Continue present management  Likely d/c home tomorrow, may require home O2    6/28  NAEON, weaned off O2, stable on RA  PT/OT with reccs for LIT, HH ordered  Stable for discharge home  Plan to complete total 10 day antibiotic course, including inpatient stay  Transition to PO antibiotics on discharge - Keflex and PO vancomycin

## 2024-06-27 NOTE — PT/OT/SLP PROGRESS
"Physical Therapy  Treatment    Gaby Stanley   MRN: [id number]   Admitting Diagnosis: Septic shock    PT Received On: 06/27/24  PT Start Time: 1110     PT Stop Time: 1135    PT Total Time (min): 25 min       Billable Minutes:  Gait Training 15 and Therapeutic Activity 10    Treatment Type: Treatment  PT/PTA: PTA     Number of PTA visits since last PT visit: 1       General Precautions: Standard, fall  Orthopedic Precautions: N/A  Braces: N/A  Respiratory Status: Nasal cannula, flow 1 L/min    Spiritual, Cultural Beliefs, Spiritism Practices, Values that Affect Care: no    Subjective:  Communicated with patient's nurse, Iris, and completed Epic chart review prior to session.  Patient agreed to PT session.     Pain/Comfort  Pain Rating 1: 3/10 ("PINCHING" AT IV SITE IN R DORSAL HAND)  Pain Addressed 1: Reposition, Distraction  Pain Rating Post-Intervention 1: 3/10    Objective:   Patient found with: peripheral IV, telemetry, oxygen    Supine > sit EOB: Modified Independent    Forward scoot towards EOB: Modified Independent    STS from EOB x2 trials No AD: SBA    400ft No AD CGA-SBA    Stand pivot T/F to toilet No AD: SPV    STS from toilet No AD: SPV    Stand at sink x2 min for hand hygiene SPV    Stand pivot T/F to EOB No AD: SPV    Completed x15 reps AROM TE to BLE: LAQ, Hip Flex, AP   Intermittent cues given as needed to maintain correct form during repetitions    Lateral scoot towards HOB x3 reps: Modified Independent    Sit > supine: Modified Independent    Educated patient on importance of increased tolerance to upright position and direct impact on CV endurance and strength. Patient encouraged to sit up in chair/ EOB, for a minimum of 2 consecutive hours, 3x per day. Encouraged patient to perform AROM TE to BLE throughout the day within all available planes of motion. Re enforced importance of utilizing call light to meet needs in room and not attempt to get up without staff assistance. Patient verbalized " understanding and agreed to comply.       AM-PAC 6 CLICK MOBILITY  How much help from another person does this patient currently need?   1 = Unable, Total/Dependent Assistance  2 = A lot, Maximum/Moderate Assistance  3 = A little, Minimum/Contact Guard/Supervision  4 = None, Modified [place]/Independent    Turning over in bed (including adjusting bedclothes, sheets and blankets)?: 4  Sitting down on and standing up from a chair with arms (e.g., wheelchair, bedside commode, etc.): 3  Moving from lying on back to sitting on the side of the bed?: 4  Moving to and from a bed to a chair (including a wheelchair)?: 3  Need to walk in hospital room?: 3  Climbing 3-5 steps with a railing?: 1 (NT)  Basic Mobility Total Score: 18    AM-PAC Raw Score CMS G-Code Modifier Level of Impairment Assistance   6 % Total / Unable   7 - 9 CM 80 - 100% Maximal Assist   10 - 14 CL 60 - 80% Moderate Assist   15 - 19 CK 40 - 60% Moderate Assist   20 - 22 CJ 20 - 40% Minimal Assist   23 CI 1-20% SBA / CGA   24 CH 0% Independent/ Mod I     Patient left with bed in chair position with call button in reach and visitors present.    Assessment:  Gaby Stanley is a 74 y.o. female with a medical diagnosis of Septic shock and presents with overall decline in functional mobility. Patient would continue to benefit from skilled PT to address functional limitations listed below in order to return to PLOF/decrease caregiver burden.     Rehab identified problem list/impairments: weakness, impaired endurance, impaired functional mobility, gait instability, impaired balance, decreased safety awareness, decreased coordination    Rehab potential is good.    Activity tolerance: Good    Discharge recommendations: Low Intensity Therapy      Barriers to discharge:      Equipment recommendations: none     GOALS:   Multidisciplinary Problems       Physical Therapy Goals          Problem: Physical Therapy    Goal Priority Disciplines Outcome Goal  Variances Interventions   Physical Therapy Goal     PT, PT/OT Progressing     Description: Pt will perform bed mobility independently in order to participate in EOB activity.  Pt will perform transfers independently in order to participate in OOB activity.  Pt will ambulate 350 ft mod I with LRAD in order to participate in daily tasks.                        PLAN:    Patient to be seen 3 x/week to address the above listed problems via gait training, therapeutic activities, therapeutic exercises  Plan of Care expires: 07/09/24  Plan of Care reviewed with: patient         06/27/2024

## 2024-06-27 NOTE — PROGRESS NOTES
[facility] - Tele[facility] ([facility]  Progress Note    Patient Name: Gaby Stanley  MRN: [id number]  Patient Class: IP- Inpatient   Admission Date: 6/24/2024  Length of Stay: 2 days  Attending Physician: Mekhi Winter MD  Primary Care Provider: Santos Sharma MD        Subjective:     Principal Problem:Septic shock        HPI:  74 year old female with known medical issues including HTN; migraines; hypercholesterol; GERD; along with noted hospitalization 2/17/24-2/19/24) for pneumonia with GGO noted on CT Chest and has been followed up outpt for possible ILD vs scarring related to prolonged macrobid therapy, she has been treated with oral steroid therapy which stopped about 2 weeks ago and the macrobid therapy discontinued     On 6/24 she presented to clinic with c/o cough, SOB, chills, N/V/Diarrhea. Clinic noted hypotension and sent to [facility] ED  Further eval showed wbc 7.0; potassium 2.8; mag 1.2; creatinine 1.3; lactate 2.7; procal 26; negative flu, covid screens  CXR with LLL and RML opacification worsening on second film after CL placement  Treated with 3.7L IVF IV abx, 20mEq potassium, and 2g Mag  Refractory hypotension required CL placement for pressor initiation     Transferring to [facility] ICU for admission on pressor support    Overview/Hospital Course:  6/25: no new issues or c/o since pt admitted to the ICU, off pressors with stable BP, on 2L NC in NAD,  at bedside this AM - all questions answered  ----  Patient admitted to ICU for septic shock, required pressor support, now weaned off  Stable for floor transfer. HMS to assume care  Diarrhea noted on admission, stool studies returned +C diff antigen, toxin negative  Start on PO vancomycin, consult ID  BP elevated this afternoon, restarted home amlodipine 10 mg    6/26  NAEON, stable on 2L NC  On ceftriaxone for pneumonia, day # 3  Continue Prednisone 40 mg, plan for prolonged taper on discharge  Pulmonary following, may need  bronch if no improving  ID following, discussed with Dr. Rothman  On PO vancomycin for empiric coverage; diarrhea improving            Review of Systems   All other systems reviewed and are negative.    Objective:     Vital Signs (Most Recent):  Temp: 99.1 °F (37.3 °C) (06/26/24 1923)  Pulse: 95 (06/26/24 2050)  Resp: 18 (06/26/24 1941)  BP: 132/76 (06/26/24 1923)  SpO2: 96 % (06/26/24 1941) Vital Signs (24h Range):  Temp:  [97.5 °F (36.4 °C)-99.1 °F (37.3 °C)] 99.1 °F (37.3 °C)  Pulse:  [71-95] 95  Resp:  [14-59] 18  SpO2:  [86 %-98 %] 96 %  BP: (111-182)/(54-83) 132/76     Weight: 66.2 kg (145 lb 15.1 oz)  Body mass index is 24.29 kg/m².    Intake/Output Summary (Last 24 hours) at 6/26/2024 2155  Last data filed at 6/26/2024 1300  Gross per 24 hour   Intake 346.84 ml   Output --   Net 346.84 ml         Physical Exam  Vitals and nursing note reviewed.   Constitutional:       General: She is not in acute distress.     Appearance: Normal appearance. She is normal weight. She is ill-appearing.   Cardiovascular:      Rate and Rhythm: Regular rhythm. Tachycardia present.      Heart sounds: No murmur heard.  Pulmonary:      Effort: Pulmonary effort is normal. No respiratory distress.      Breath sounds: No wheezing.      Comments: 2L NC  Neurological:      General: No focal deficit present.      Mental Status: She is alert and oriented to person, place, and time.   Psychiatric:         Mood and Affect: Mood normal.         Behavior: Behavior normal.             Significant Labs: All pertinent labs within the past 24 hours have been reviewed.  Recent Lab Results         06/26/24  0317        Procalcitonin 20.19  Comment: A concentration < 0.25 ng/mL represents a low risk of bacterial   infection.  Procalcitonin may not be accurate among patients with localized   infection, recent trauma or major surgery, immunosuppressed state,   invasive fungal infection, renal dysfunction. Decisions regarding   initiation or  continuation of antibiotic therapy should not be based   solely on procalcitonin levels.         Albumin 2.9       ALP 85       ALT 18       Anion Gap 10       AST 35       Bands 16.0       Basophil % 0.0       Bilirubin Direct 0.1       BILIRUBIN TOTAL 0.3  Comment: For infants and newborns, interpretation of results should be based  on gestational age, weight and in agreement with clinical  observations.    Premature Infant recommended reference ranges:  Up to 24 hours.............<8.0 mg/dL  Up to 48 hours............<12.0 mg/dL  3-5 days..................<15.0 mg/dL  6-29 days.................<15.0 mg/dL         BUN 17       Calcium 9.3       Chloride 107       CO2 24       Creatinine 0.8       Differential Method Manual  Comment: CORRECTED RESULT; previously reported as Automated on 06/26/2024 at   04:14.    [C]       eGFR >60       Eos % 2.0       Glucose 83       Gran % 70.0       Hematocrit 28.4       Hemoglobin 9.2       Immature Grans (Abs) CANCELED  Comment: Mild elevation in immature granulocytes is non specific and   can be seen in a variety of conditions including stress response,   acute inflammation, trauma and pregnancy. Correlation with other   laboratory and clinical findings is essential.    Result canceled by the ancillary.         Immature Granulocytes CANCELED  Comment: Result canceled by the ancillary.       Lymph % 6.0       Magnesium  2.0       MCH 28.3       MCHC 32.4       MCV 87       Mono % 6.0       MPV 10.9       nRBC 0       Ovalocytes Occasional       Platelet Estimate Appears normal       Platelet Count 154       Potassium 3.6       PROTEIN TOTAL 6.1       RBC 3.25       RDW 14.9       Sodium 141       WBC 12.89                [C] - Corrected Result               Significant Imaging: I have reviewed all pertinent imaging results/findings within the past 24 hours.    CT Chest Without Contrast   Final Result      Bilateral mixed pulmonary opacity and small pleural effusions          Electronically signed by: Dominique Shirley   Date:    06/24/2024   Time:    23:14      X-Ray Chest 1 View   Final Result      Satisfactory line placement radiograph         Electronically signed by: Dominique Shirley   Date:    06/24/2024   Time:    16:53      X-Ray Chest AP Portable   Final Result      As above.         Electronically signed by: Andrea Rainey   Date:    06/24/2024   Time:    12:32            Assessment/Plan:      * Septic shock  This patient has shock. The type of shock is distributive due to sepsis. The patient has the following evidence of shock: persistent hypotension  The patient was admitted to an intensive care unit  Treated with IV antibiotics, steroids, pressor support - now weaned off  Follow blood and sputum cultures, UA was normal   Continue broad spectrum IV antibiotics with vancomycin and ceftriaxone    Procal remains elevated with WBC 11K  reports treatment with multiple courses of abx and steroids since last fall   On steroids essentially since 2/2024, fungal workup pending given immunocompromised state      Blood, urine cultures pending  Diarrhea noted on admission  C diff antigen positive, toxin negative  Results indeterminate, consult ID  On empiric treatment    Pneumonia of both lower lobes due to infectious organism  Per Pulmonary, concern for macrobid related lung injury (drug has been previously stopped), ILD, IPF, BOOP, pneumonitis, scarring from GERD  [facility] Pulm note reviewed from 6/13 that reports normal PFTs and was stated on Trelegy with cough meds    Continue broad spec abx with rocephin and vanc  Sputum culture collected, follow  On prednisone 40 mg  On 2L NC with sat of 90-91%  Echo normal Feb of this year  Procal elevated  Check fungal workup and start rheum workup  PT/OT, IS, OOB, mobilize     Diarrhea  C diff antigen positive, toxin negative  Results indeterminate, consult ID  On empiric treatment    GERD (gastroesophageal reflux disease)  Continue  protonix        VTE Risk Mitigation (From admission, onward)           Ordered     enoxaparin injection 40 mg  Daily         06/24/24 2125                    Discharge Planning   TAB:      Code Status: Full Code   Is the patient medically ready for discharge?:     Reason for patient still in hospital (select all that apply): Patient new problem, Patient trending condition, Laboratory test, Treatment, and Consult recommendations  Discharge Plan A: Home with family                  Mekhi Winter MD  Department of Hospital Medicine   [facility] ([facility])

## 2024-06-27 NOTE — PT/OT/SLP PROGRESS
"Occupational Therapy   Treatment    Name: Gaby Stanley  MRN: [id number]  Admitting Diagnosis:  Septic shock       Recommendations:     Discharge Recommendations: Low Intensity Therapy  Discharge Equipment Recommendations:  none  Barriers to discharge:  None    Assessment:     Gaby Stanley is a 74 y.o. female with a medical diagnosis of Septic shock.  She presents with the following performance deficits affecting function are weakness, impaired endurance, impaired self care skills, impaired functional mobility, gait instability, impaired balance, decreased safety awareness, decreased coordination, pain.     Rehab Prognosis:  Good; patient would benefit from acute skilled OT services to address these deficits and reach maximum level of function.       Plan:     Patient to be seen 2 x/week to address the above listed problems via self-care/home management, therapeutic activities, therapeutic exercises  Plan of Care Expires: 07/09/24  Plan of Care Reviewed with: patient    Subjective     Chief Complaint: pain and "pinching" at R hand IV site  Patient/Family Comments/goals: get better, return home  Pain/Comfort:  Pain Rating 1: 3/10  Location - Side 1: Right  Location - Orientation 1:  (dorsal)  Location 1: hand (at IV site)  Pain Addressed 1: Reposition, Distraction  Pain Rating Post-Intervention 1: 3/10    Objective:     Communicated with: Nurse Simmons and epic chart review prior to session.  Patient found supine with peripheral IV, telemetry, oxygen upon OT entry to room.    General Precautions: Standard, fall    Orthopedic Precautions:N/A  Braces: N/A  Respiratory Status: Nasal cannula, flow 1 L/min     Occupational Performance:     Bed Mobility:    Patient completed Rolling/Turning to Left with  modified independence  Patient completed Scooting/Bridging with modified independence  Patient completed Supine to Sit with modified independence  Patient completed Sit to Supine with modified independence     Functional " Mobility/Transfers:  Patient completed Sit <> Stand Transfer with stand by assistance  with  no assistive device   Patient completed Toilet Transfer Stand Pivot technique with stand by assistance with  no AD  Functional Mobility: Patient completed x400ft functional mobility with CGA>SBA and no AD to increase dynamic standing balance and activity tolerance needed for ADL completion.   Stand pivot t/f to EOB with SBA and no AD.    Activities of Daily Living:  Grooming: supervision pt washing hands while standing at sink  Upper Body Dressing: minimum assistance natalie gown around back, required assist d/t discomfort with IV site  Toileting: supervision using bathroom commode    [facility] 6 Click ADL: 21    Treatment & Education:  Pt performed x15 reps AROM therex in chair:  BUE shoulder flexion  BUE elbow flexion/ext  LUE wrist flexion/ext  LUE digit flexion/ext  Reviewed role of OT in acute setting and benefits of participation. Educated on techniques to use to increase independence and decrease fall risk with functional transfers. Educated on importance of OOB activity and calling for A to transfer and meet needs. Encouraged completion of B UE AROM therex throughout the day to tolerance to increase functional strength and activity tolerance. Educated patient on importance of increased tolerance to upright position and direct impact on CV endurance and strength. Patient encouraged to sit up in chair/EOB for a minimum of 2 consecutive hours per day.  Patient stated understanding and in agreement with POC.     Patient left with bed in chair position with all lines intact, call button in reach, and visitors present    GOALS:   Multidisciplinary Problems       Occupational Therapy Goals          Problem: Occupational Therapy    Goal Priority Disciplines Outcome Interventions   Occupational Therapy Goal     OT, PT/OT Progressing    Description: Goals to be met by: 7/9/24     Patient will increase functional independence with  ADLs by performing:    Toileting from toilet with Contact Guard Assistance for hygiene and clothing management.   Toilet transfer to toilet with Contact Guard Assistance.  Upper extremity exercise program x10 reps per handout, with supervision.                         Time Tracking:     OT Date of Treatment: 06/27/24  OT Start Time: 1145  OT Stop Time: 1210  OT Total Time (min): 25 min    Billable Minutes:Self Care/Home Management 10  Therapeutic Activity 15    OT/DANIELLA: OT      Nabila Millard OT     6/27/2024

## 2024-06-27 NOTE — PLAN OF CARE
POC reviewed with pt. Pt verbalizes understanding of POC. No questions at this time.  AAOx3. NADN.  NSR on cardiac monitor.  PRN medications given for pain.   PRN medication given for nausea.  Pt remains free of falls.  No complaints at this time.  Safety measures in place. Will continue to monitor.  Informed pt to call for assistance before getting up. Pt verbalizes understanding.  Hourly rounding and chart check complete.

## 2024-06-28 VITALS
SYSTOLIC BLOOD PRESSURE: 132 MMHG | HEART RATE: 84 BPM | RESPIRATION RATE: 16 BRPM | TEMPERATURE: 98 F | HEIGHT: 65 IN | BODY MASS INDEX: 24.32 KG/M2 | WEIGHT: 145.94 LBS | DIASTOLIC BLOOD PRESSURE: 56 MMHG | OXYGEN SATURATION: 93 %

## 2024-06-28 PROBLEM — R65.21 SEPTIC SHOCK: Status: RESOLVED | Noted: 2024-06-24 | Resolved: 2024-06-28

## 2024-06-28 PROBLEM — A41.9 SEPTIC SHOCK: Status: RESOLVED | Noted: 2024-06-24 | Resolved: 2024-06-28

## 2024-06-28 PROBLEM — R19.7 DIARRHEA: Status: RESOLVED | Noted: 2024-06-25 | Resolved: 2024-06-28

## 2024-06-28 LAB
ALBUMIN SERPL BCP-MCNC: 2.7 G/DL (ref 3.5–5.2)
ALP SERPL-CCNC: 71 U/L (ref 55–135)
ALT SERPL W/O P-5'-P-CCNC: 17 U/L (ref 10–44)
ANION GAP SERPL CALC-SCNC: 9 MMOL/L (ref 8–16)
AST SERPL-CCNC: 17 U/L (ref 10–40)
BASOPHILS # BLD AUTO: 0.03 K/UL (ref 0–0.2)
BASOPHILS NFR BLD: 0.5 % (ref 0–1.9)
BILIRUB DIRECT SERPL-MCNC: <0.1 MG/DL (ref 0.1–0.3)
BILIRUB SERPL-MCNC: 0.2 MG/DL (ref 0.1–1)
BUN SERPL-MCNC: 13 MG/DL (ref 8–23)
CALCIUM SERPL-MCNC: 9.1 MG/DL (ref 8.7–10.5)
CHLORIDE SERPL-SCNC: 107 MMOL/L (ref 95–110)
CO2 SERPL-SCNC: 24 MMOL/L (ref 23–29)
CREAT SERPL-MCNC: 0.7 MG/DL (ref 0.5–1.4)
DIFFERENTIAL METHOD BLD: ABNORMAL
EOSINOPHIL # BLD AUTO: 0 K/UL (ref 0–0.5)
EOSINOPHIL NFR BLD: 0.5 % (ref 0–8)
ERYTHROCYTE [DISTWIDTH] IN BLOOD BY AUTOMATED COUNT: 14.8 % (ref 11.5–14.5)
EST. GFR  (NO RACE VARIABLE): >60 ML/MIN/1.73 M^2
GLUCOSE SERPL-MCNC: 95 MG/DL (ref 70–110)
HCT VFR BLD AUTO: 29.2 % (ref 37–48.5)
HGB BLD-MCNC: 9.4 G/DL (ref 12–16)
IMM GRANULOCYTES # BLD AUTO: 0.21 K/UL (ref 0–0.04)
IMM GRANULOCYTES NFR BLD AUTO: 3.7 % (ref 0–0.5)
L PNEUMO AG UR QL IA: NEGATIVE
LYMPHOCYTES # BLD AUTO: 1 K/UL (ref 1–4.8)
LYMPHOCYTES NFR BLD: 17.5 % (ref 18–48)
MAGNESIUM SERPL-MCNC: 1.9 MG/DL (ref 1.6–2.6)
MCH RBC QN AUTO: 28.3 PG (ref 27–31)
MCHC RBC AUTO-ENTMCNC: 32.2 G/DL (ref 32–36)
MCV RBC AUTO: 88 FL (ref 82–98)
MONOCYTES # BLD AUTO: 0.7 K/UL (ref 0.3–1)
MONOCYTES NFR BLD: 13.1 % (ref 4–15)
NEUTROPHILS # BLD AUTO: 3.7 K/UL (ref 1.8–7.7)
NEUTROPHILS NFR BLD: 64.7 % (ref 38–73)
NRBC BLD-RTO: 0 /100 WBC
PLATELET # BLD AUTO: 191 K/UL (ref 150–450)
PMV BLD AUTO: 10.8 FL (ref 9.2–12.9)
POTASSIUM SERPL-SCNC: 3.7 MMOL/L (ref 3.5–5.1)
PROCALCITONIN SERPL IA-MCNC: 6.96 NG/ML
PROT SERPL-MCNC: 6 G/DL (ref 6–8.4)
RBC # BLD AUTO: 3.32 M/UL (ref 4–5.4)
SODIUM SERPL-SCNC: 140 MMOL/L (ref 136–145)
WBC # BLD AUTO: 5.66 K/UL (ref 3.9–12.7)

## 2024-06-28 PROCEDURE — 25000003 PHARM REV CODE 250: Performed by: NURSE PRACTITIONER

## 2024-06-28 PROCEDURE — 84145 PROCALCITONIN (PCT): CPT | Performed by: NURSE PRACTITIONER

## 2024-06-28 PROCEDURE — 27000221 HC OXYGEN, UP TO 24 HOURS

## 2024-06-28 PROCEDURE — 25000242 PHARM REV CODE 250 ALT 637 W/ HCPCS: Performed by: NURSE PRACTITIONER

## 2024-06-28 PROCEDURE — 83735 ASSAY OF MAGNESIUM: CPT | Performed by: NURSE PRACTITIONER

## 2024-06-28 PROCEDURE — 85025 COMPLETE CBC W/AUTO DIFF WBC: CPT | Performed by: NURSE PRACTITIONER

## 2024-06-28 PROCEDURE — 94640 AIRWAY INHALATION TREATMENT: CPT

## 2024-06-28 PROCEDURE — 80048 BASIC METABOLIC PNL TOTAL CA: CPT | Performed by: NURSE PRACTITIONER

## 2024-06-28 PROCEDURE — 94761 N-INVAS EAR/PLS OXIMETRY MLT: CPT

## 2024-06-28 PROCEDURE — 36415 COLL VENOUS BLD VENIPUNCTURE: CPT | Performed by: NURSE PRACTITIONER

## 2024-06-28 PROCEDURE — 80076 HEPATIC FUNCTION PANEL: CPT | Performed by: NURSE PRACTITIONER

## 2024-06-28 PROCEDURE — 63600175 PHARM REV CODE 636 W HCPCS: Performed by: NURSE PRACTITIONER

## 2024-06-28 PROCEDURE — 25000003 PHARM REV CODE 250: Performed by: HOSPITALIST

## 2024-06-28 PROCEDURE — 99900035 HC TECH TIME PER 15 MIN (STAT)

## 2024-06-28 PROCEDURE — 97116 GAIT TRAINING THERAPY: CPT | Mod: CQ

## 2024-06-28 PROCEDURE — 94799 UNLISTED PULMONARY SVC/PX: CPT

## 2024-06-28 PROCEDURE — 97530 THERAPEUTIC ACTIVITIES: CPT | Mod: CQ

## 2024-06-28 RX ORDER — VANCOMYCIN HYDROCHLORIDE 125 MG/1
125 CAPSULE ORAL EVERY 6 HOURS
Qty: 24 CAPSULE | Refills: 0 | Status: SHIPPED | OUTPATIENT
Start: 2024-06-28 | End: 2024-07-04

## 2024-06-28 RX ORDER — PROMETHAZINE HYDROCHLORIDE 25 MG/1
25 TABLET ORAL EVERY 6 HOURS PRN
Qty: 30 TABLET | Refills: 0 | Status: SHIPPED | OUTPATIENT
Start: 2024-06-28

## 2024-06-28 RX ORDER — CEPHALEXIN 500 MG/1
500 CAPSULE ORAL EVERY 12 HOURS
Qty: 10 CAPSULE | Refills: 0 | Status: SHIPPED | OUTPATIENT
Start: 2024-06-28 | End: 2024-07-03

## 2024-06-28 RX ORDER — OXYCODONE AND ACETAMINOPHEN 5; 325 MG/1; MG/1
1 TABLET ORAL EVERY 8 HOURS PRN
Qty: 15 TABLET | Refills: 0 | Status: SHIPPED | OUTPATIENT
Start: 2024-06-28

## 2024-06-28 RX ADMIN — BUDESONIDE INHALATION 0.5 MG: 0.5 SUSPENSION RESPIRATORY (INHALATION) at 07:06

## 2024-06-28 RX ADMIN — VANCOMYCIN HYDROCHLORIDE 125 MG: KIT at 05:06

## 2024-06-28 RX ADMIN — OXYCODONE AND ACETAMINOPHEN 1 TABLET: 7.5; 325 TABLET ORAL at 07:06

## 2024-06-28 RX ADMIN — ARFORMOTEROL TARTRATE 15 MCG: 15 SOLUTION RESPIRATORY (INHALATION) at 07:06

## 2024-06-28 RX ADMIN — MUPIROCIN: 20 OINTMENT TOPICAL at 08:06

## 2024-06-28 RX ADMIN — PREDNISONE 40 MG: 20 TABLET ORAL at 08:06

## 2024-06-28 RX ADMIN — PANTOPRAZOLE SODIUM 40 MG: 40 TABLET, DELAYED RELEASE ORAL at 08:06

## 2024-06-28 RX ADMIN — SERTRALINE HYDROCHLORIDE 100 MG: 50 TABLET ORAL at 08:06

## 2024-06-28 RX ADMIN — AMLODIPINE BESYLATE 10 MG: 10 TABLET ORAL at 07:06

## 2024-06-28 NOTE — PROGRESS NOTES
[facility] - [facility] ([facility] Medicine  Progress Note    Patient Name: Gaby Stanley  MRN: [id number]  Patient Class: IP- Inpatient   Admission Date: 6/24/2024  Length of Stay: 3 days  Attending Physician: Mekhi Winter MD  Primary Care Provider: Santos Sharma MD        Subjective:     Principal Problem:Septic shock        HPI:  74 year old female with known medical issues including HTN; migraines; hypercholesterol; GERD; along with noted hospitalization 2/17/24-2/19/24) for pneumonia with GGO noted on CT Chest and has been followed up outpt for possible ILD vs scarring related to prolonged macrobid therapy, she has been treated with oral steroid therapy which stopped about 2 weeks ago and the macrobid therapy discontinued     On 6/24 she presented to clinic with c/o cough, SOB, chills, N/V/Diarrhea. Clinic noted hypotension and sent to [facility] ED  Further eval showed wbc 7.0; potassium 2.8; mag 1.2; creatinine 1.3; lactate 2.7; procal 26; negative flu, covid screens  CXR with LLL and RML opacification worsening on second film after CL placement  Treated with 3.7L IVF IV abx, 20mEq potassium, and 2g Mag  Refractory hypotension required CL placement for pressor initiation     Transferring to [facility] ICU for admission on pressor support    Overview/Hospital Course:  6/25: no new issues or c/o since pt admitted to the ICU, off pressors with stable BP, on 2L NC in NAD,  at bedside this AM - all questions answered  ----  Patient admitted to ICU for septic shock, required pressor support, now weaned off  Stable for floor transfer. HMS to assume care  Diarrhea noted on admission, stool studies returned +C diff antigen, toxin negative  Start on PO vancomycin, consult ID  BP elevated this afternoon, restarted home amlodipine 10 mg    6/26  NAEON, stable on 2L NC  On ceftriaxone for pneumonia, day # 3  Continue Prednisone 40 mg, plan for prolonged taper on discharge  Pulmonary following, may need  bronch if no improving  ID following, discussed with Dr. Rothman  On PO vancomycin for empiric coverage; diarrhea improving    6/27  NAEON, weaned to 1L NC  No further diarrhea  Ambulating with PT/OT  Continue present management  Likely d/c home tomorrow, may require home O2      Review of Systems   All other systems reviewed and are negative.    Objective:     Vital Signs (Most Recent):  Temp: 98.2 °F (36.8 °C) (06/27/24 1943)  Pulse: 78 (06/27/24 1943)  Resp: 18 (06/27/24 2026)  BP: 118/64 (06/27/24 1943)  SpO2: 96 % (06/27/24 1943) Vital Signs (24h Range):  Temp:  [97.7 °F (36.5 °C)-98.7 °F (37.1 °C)] 98.2 °F (36.8 °C)  Pulse:  [60-88] 78  Resp:  [16-20] 18  SpO2:  [94 %-98 %] 96 %  BP: (111-137)/(53-65) 118/64     Weight: 66.2 kg (145 lb 15.1 oz)  Body mass index is 24.29 kg/m².    Intake/Output Summary (Last 24 hours) at 6/27/2024 2333  Last data filed at 6/27/2024 1058  Gross per 24 hour   Intake 140 ml   Output 200 ml   Net -60 ml         Physical Exam  Vitals and nursing note reviewed.   Constitutional:       General: She is not in acute distress.     Appearance: Normal appearance. She is normal weight.   Cardiovascular:      Rate and Rhythm: Normal rate and regular rhythm.      Heart sounds: No murmur heard.  Pulmonary:      Effort: Pulmonary effort is normal. No respiratory distress.      Breath sounds: No wheezing.      Comments: 1L NC  Neurological:      Mental Status: She is alert.             Significant Labs: All pertinent labs within the past 24 hours have been reviewed.  Recent Lab Results         06/27/24  0550   06/27/24  0549        Procalcitonin 10.39  Comment: A concentration < 0.25 ng/mL represents a low risk of bacterial   infection.  Procalcitonin may not be accurate among patients with localized   infection, recent trauma or major surgery, immunosuppressed state,   invasive fungal infection, renal dysfunction. Decisions regarding   initiation or continuation of antibiotic therapy should not  be based   solely on procalcitonin levels.           Albumin   2.7       ALP   77       ALT   17       Anion Gap   11       AST   22       Baso # 0.02         Basophil % 0.2         Bilirubin Direct   0.1       BILIRUBIN TOTAL   0.3  Comment: For infants and newborns, interpretation of results should be based  on gestational age, weight and in agreement with clinical  observations.    Premature Infant recommended reference ranges:  Up to 24 hours.............<8.0 mg/dL  Up to 48 hours............<12.0 mg/dL  3-5 days..................<15.0 mg/dL  6-29 days.................<15.0 mg/dL         BUN   9       Calcium   9.1       Chloride   107       CO2   24       Creatinine   0.7       Differential Method Automated         eGFR   >60       Eos # 0.1         Eos % 0.9         Glucose   87       Gran # (ANC) 6.6         Gran % 76.8         Hematocrit 26.7         Hemoglobin 8.7         Immature Grans (Abs) 0.11  Comment: Mild elevation in immature granulocytes is non specific and   can be seen in a variety of conditions including stress response,   acute inflammation, trauma and pregnancy. Correlation with other   laboratory and clinical findings is essential.           Immature Granulocytes 1.3         Lymph # 1.1         Lymph % 12.6         Magnesium    1.5       MCH 28.3         MCHC 32.6         MCV 87         Mono # 0.7         Mono % 8.2         MPV 11.2         nRBC 0         Platelet Count 160         Potassium   3.4       PROTEIN TOTAL   5.8       RBC 3.07         RDW 15.1         Sodium   142       WBC 8.64                 Significant Imaging: I have reviewed all pertinent imaging results/findings within the past 24 hours.    CT Chest Without Contrast   Final Result      Bilateral mixed pulmonary opacity and small pleural effusions         Electronically signed by: Dominique Shirley   Date:    06/24/2024   Time:    23:14      X-Ray Chest 1 View   Final Result      Satisfactory line placement radiograph          Electronically signed by: Dominique Shirley   Date:    06/24/2024   Time:    16:53      X-Ray Chest AP Portable   Final Result      As above.         Electronically signed by: Andrea Rainey   Date:    06/24/2024   Time:    12:32            Assessment/Plan:      * Septic shock  This patient has shock. The type of shock is distributive due to sepsis. The patient has the following evidence of shock: persistent hypotension  The patient was admitted to an intensive care unit  Treated with IV antibiotics, steroids, pressor support - now weaned off  Follow blood and sputum cultures, UA was normal   Continue broad spectrum IV antibiotics with vancomycin and ceftriaxone    Procal remains elevated with WBC 11K  reports treatment with multiple courses of abx and steroids since last fall   On steroids essentially since 2/2024, fungal workup pending given immunocompromised state      Blood, urine cultures pending  Diarrhea noted on admission  C diff antigen positive, toxin negative  Results indeterminate, consult ID  On empiric treatment    Pneumonia of both lower lobes due to infectious organism  Per Pulmonary, concern for macrobid related lung injury (drug has been previously stopped), ILD, IPF, BOOP, pneumonitis, scarring from GERD  [facility] note reviewed from 6/13 that reports normal PFTs and was stated on Trelegy with cough meds    Continue broad spec abx with rocephin and vanc  Sputum culture collected, follow  On prednisone 40 mg  On 2L NC with sat of 90-91%  Echo normal Feb of this year  Procal elevated  Check fungal workup and start rheum workup  PT/OT, IS, OOB, mobilize     Diarrhea  C diff antigen positive, toxin negative  Results indeterminate, consult ID  On empiric treatment    GERD (gastroesophageal reflux disease)  Continue protonix        VTE Risk Mitigation (From admission, onward)           Ordered     enoxaparin injection 40 mg  Daily         06/24/24 2125                    Discharge Planning   TAB:       Code Status: Full Code   Is the patient medically ready for discharge?:     Reason for patient still in hospital (select all that apply): Patient trending condition, Laboratory test, Treatment, Consult recommendations, and PT / OT recommendations  Discharge Plan A: Home with family                  Mekhi Winter MD  Department of Hospital Medicine   [facility] ([facility])

## 2024-06-28 NOTE — DISCHARGE SUMMARY
[facility] - [facility] ([facility])  [facility] Medicine  Discharge Summary      Patient Name: Gaby Stanley  MRN: [id number]  DAVID: [id number]  Patient Class: IP- Inpatient  Admission Date: 6/24/2024  Hospital Length of Stay: 4 days  Discharge Date and Time:  06/28/2024 12:42 PM  Attending Physician: Mekhi Winter MD   Discharging Provider: Mekhi Winter MD  Primary Care Provider: Santos Sharma MD    Primary Care Team: Networked reference to record PCT     HPI:   74 year old female with known medical issues including HTN; migraines; hypercholesterol; GERD; along with noted hospitalization 2/17/24-2/19/24) for pneumonia with GGO noted on CT Chest and has been followed up outpt for possible ILD vs scarring related to prolonged macrobid therapy, she has been treated with oral steroid therapy which stopped about 2 weeks ago and the macrobid therapy discontinued     On 6/24 she presented to clinic with c/o cough, SOB, chills, N/V/Diarrhea. Clinic noted hypotension and sent to [facility] ED  Further eval showed wbc 7.0; potassium 2.8; mag 1.2; creatinine 1.3; lactate 2.7; procal 26; negative flu, covid screens  CXR with LLL and RML opacification worsening on second film after CL placement  Treated with 3.7L IVF IV abx, 20mEq potassium, and 2g Mag  Refractory hypotension required CL placement for pressor initiation     Transferring to [facility] ICU for admission on pressor support    * No surgery found *      Hospital Course:   6/25: no new issues or c/o since pt admitted to the ICU, off pressors with stable BP, on 2L NC in NAD,  at bedside this AM - all questions answered  ----  Patient admitted to ICU for septic shock, required pressor support, now weaned off  Stable for floor transfer. HMS to assume care  Diarrhea noted on admission, stool studies returned +C diff antigen, toxin negative  Start on PO vancomycin, consult ID  BP elevated this afternoon, restarted home amlodipine 10 mg    6/26  NAEON, stable on 2L NC  On  ceftriaxone for pneumonia, day # 3  Continue Prednisone 40 mg, plan for prolonged taper on discharge  Pulmonary following, may need bronch if no improving  ID following, discussed with Dr. Rothman  On PO vancomycin for empiric coverage; diarrhea improving    6/27  NAEON, weaned to 1L NC  No further diarrhea  Ambulating with PT/OT  Continue present management  Likely d/c home tomorrow, may require home O2    6/28  NAEON, weaned off O2, stable on RA  PT/OT with reccs for LIT, HH ordered  Stable for discharge home  Plan to complete total 10 day antibiotic course, including inpatient stay  Transition to PO antibiotics on discharge - Keflex and PO vancomycin     Goals of Care Treatment Preferences:  Code Status: Full Code      Consults:   Consults (From admission, onward)          Status Ordering Provider     Inpatient consult to Social Work  Once        Provider:  (Not yet assigned)    Completed JOVANNI CARRILLO     Inpatient consult to Hospitalist  Once        Provider:  (Not yet assigned)    Completed ESSIE JOLLY            No new Assessment & Plan notes have been filed under this hospital service since the last note was generated.  Service: Hospital Medicine    Final Active Diagnoses:    Diagnosis Date Noted POA    Pneumonia of both lower lobes due to infectious organism [J18.9] 02/17/2024 Yes    GERD (gastroesophageal reflux disease) [K21.9] 02/17/2024 Yes    Interstitial lung disease [J84.9] 03/07/2024 Yes      Problems Resolved During this Admission:    Diagnosis Date Noted Date Resolved POA    PRINCIPAL PROBLEM:  Septic shock [A41.9, R65.21] 06/24/2024 06/28/2024 Yes    Diarrhea [R19.7] 06/25/2024 06/28/2024 Yes       Discharged Condition: stable    Disposition: Home or Self Care    Follow Up:   Follow-up Information       Santos Sharma MD. Schedule an appointment as soon as possible for a visit in 1 week(s).    Specialty: Internal Medicine  Why: Hospital discharge follow up  Contact information:  [phone]  [facility] [id number]                           Patient Instructions:   No discharge procedures on file.    Significant Diagnostic Studies: Labs: All labs within the past 24 hours have been reviewed    Pending Diagnostic Studies:       Procedure Component Value Units Date/Time    Anti-Neutrophilic Cytoplasmic Antibody [[id number]] Collected: 06/25/24 1201    Order Status: Sent Lab Status: In process Updated: 06/25/24 2042    Specimen: Blood     Fungal Immunodiffusion - Blood [[id number]] Collected: 06/25/24 0847    Order Status: Sent Lab Status: In process Updated: 06/25/24 1517    Specimen: Blood            Medications:  Reconciled Home Medications:      Medication List        START taking these medications      cephALEXin 500 MG capsule  Commonly known as: KEFLEX  Take 1 capsule (500 mg total) by mouth every 12 (twelve) hours. for 5 days     oxyCODONE-acetaminophen 5-325 mg per tablet  Commonly known as: PERCOCET  Take 1 tablet by mouth every 8 (eight) hours as needed for Pain.     vancomycin 25 mg/mL Solr  Commonly known as: FIRVANQ  Take 5 mLs (125 mg total) by mouth every 6 (six) hours. for 6 days            CONTINUE taking these medications      amLODIPine 10 MG tablet  Commonly known as: NORVASC  Take 1 tablet by mouth every morning.     azelastine 137 mcg (0.1 %) nasal spray  Commonly known as: ASTELIN  1 spray (137 mcg total) by Nasal route 2 (two) times daily.     butalbital-acetaminophen-caffeine -40 mg -40 mg per tablet  Commonly known as: FIORICET, ESGIC  Take 1 tablet by mouth every 6 (six) hours as needed for Pain.     CRESTOR ORAL  Take by mouth.     cyanocobalamin 1,000 mcg/mL injection  Inject 1,000 mcg into the muscle.     ICAR-C PLUS Tab  Generic drug: iron-vit c-b12-folic acid  Take 1 tablet by mouth every morning.     IMITREX ORAL  Take by mouth as needed.     omeprazole 10 MG capsule  Commonly known as: PRILOSEC  Take 40 mg by mouth once daily.      ondansetron 4 MG tablet  Commonly known as: ZOFRAN  Take 4 mg by mouth every 8 (eight) hours as needed.     pantoprazole 40 MG tablet  Commonly known as: PROTONIX  Take 40 mg by mouth 2 (two) times daily.     predniSONE 10 MG tablet  Commonly known as: DELTASONE  Take 1 tablet (10 mg total) by mouth once daily.     promethazine 25 MG tablet  Commonly known as: PHENERGAN  Take 1 tablet (25 mg total) by mouth every 6 (six) hours as needed for Nausea.     sertraline 100 MG tablet  Commonly known as: ZOLOFT  Take 100 mg by mouth once daily.     traZODone 100 MG tablet  Commonly known as: DESYREL  Take 2 tablets by mouth every evening.     ZENPEP 25,000-85,000- 136,000 unit Cpdr  Generic drug: lipase-protease-amylase  Take by mouth before meals and at bedtime as needed.            STOP taking these medications      loratadine 10 mg tablet  Commonly known as: CLARITIN     ondansetron 4 MG Tbdl  Commonly known as: ZOFRAN-ODT              Indwelling Lines/Drains at time of discharge:   Lines/Drains/Airways       None                   Time spent on the discharge of patient: 45 minutes         Mekhi Winter MD  Department of Hospital Medicine  [facility] ([facility])

## 2024-06-28 NOTE — PLAN OF CARE
[facility] - Tele[facility] (Hospital)  Discharge Final Note    Primary Care Provider: Santos Sharma MD    Expected Discharge Date: 6/28/2024    Final Discharge Note (most recent)       Final Note - 06/28/24 1216          Final Note    Anticipated Discharge Disposition Home or Self Care        Post-Acute Status    Post-Acute Authorization Home Health     Home Health Status Patient declined/refused     Discharge Delays None known at this time                   Pt to discharge home today. SW discussed home health recommendations; family declining. Spouse reports he will be home with patient. SW advised family on how to obtain HH from PCP if needed at a later date. Pt/spouse to arrange non-[facility] PCP follow up.     Important Message from Medicare

## 2024-06-28 NOTE — PT/OT/SLP PROGRESS
Physical Therapy  Treatment    Gaby Stanley   MRN: [id number]   Admitting Diagnosis: Septic shock    PT Received On: 06/28/24  PT Start Time: 0730     PT Stop Time: 0755    PT Total Time (min): 25 min       Billable Minutes:  Gait Training 15 and Therapeutic Activity 10    Treatment Type: Treatment  PT/PTA: PTA     Number of PTA visits since last PT visit: 2       General Precautions: Standard, fall  Orthopedic Precautions: N/A  Braces: N/A  Respiratory Status: Nasal cannula, flow 1 L/min    Spiritual, Cultural Beliefs, Protestant Practices, Values that Affect Care: no    Subjective:  Communicated with patient's nurse, Lam, and completed Epic chart review prior to session.  Patient agreed to PT session.     Pain/Comfort  Pain Rating 1: 0/10  Pain Rating Post-Intervention 1: 0/10    Objective:   Patient found with: peripheral IV, telemetry, oxygen    Patient found sitting EOB.     STS x2 trials from EOB No AD: SPV     800ft No AD SPV    Stand pivot T/F to EOB No AD: SPV    Standing AROM TE to BLE with single UE support (HHAx1) x10 reps each:   MIP   Calf raises   Mini squats    Sit > supine: Modified Independent    Educated patient on importance of increased tolerance to upright position and direct impact on CV endurance and strength. Patient encouraged to sit up in chair/ EOB, for a minimum of 2 consecutive hours, 3x per day. Encouraged patient to perform AROM TE to BLE throughout the day within all available planes of motion. Re enforced importance of utilizing call light to meet needs in room and not attempt to get up without staff assistance. Patient verbalized understanding and agreed to comply.       AM-PAC 6 CLICK MOBILITY  How much help from another person does this patient currently need?   1 = Unable, Total/Dependent Assistance  2 = A lot, Maximum/Moderate Assistance  3 = A little, Minimum/Contact Guard/Supervision  4 = None, Modified [place]/Independent    Turning over in bed (including adjusting  bedclothes, sheets and blankets)?: 1 (NT)  Sitting down on and standing up from a chair with arms (e.g., wheelchair, bedside commode, etc.): 3  Moving from lying on back to sitting on the side of the bed?: 1 (NT)  Moving to and from a bed to a chair (including a wheelchair)?: 3  Need to walk in hospital room?: 3  Climbing 3-5 steps with a railing?: 1 (NT)  Basic Mobility Total Score: 12    AM-PAC Raw Score CMS G-Code Modifier Level of Impairment Assistance   6 % Total / Unable   7 - 9 CM 80 - 100% Maximal Assist   10 - 14 CL 60 - 80% Moderate Assist   15 - 19 CK 40 - 60% Moderate Assist   20 - 22 CJ 20 - 40% Minimal Assist   23 CI 1-20% SBA / CGA   24 CH 0% Independent/ Mod I     Patient left with bed in chair position with call button in reach and visitors present.    Assessment:  Gaby Stanley is a 74 y.o. female with a medical diagnosis of Septic shock and presents with overall decline in functional mobility. Patient would continue to benefit from skilled PT to address functional limitations listed below in order to return to PLOF/decrease caregiver burden.     Rehab identified problem list/impairments: gait instability, impaired balance, decreased safety awareness    Rehab potential is good.    Activity tolerance: Good    Discharge recommendations: Low Intensity Therapy      Barriers to discharge:      Equipment recommendations: none     GOALS:   Multidisciplinary Problems       Physical Therapy Goals          Problem: Physical Therapy    Goal Priority Disciplines Outcome Goal Variances Interventions   Physical Therapy Goal     PT, PT/OT Progressing     Description: Pt will perform bed mobility independently in order to participate in EOB activity.  Pt will perform transfers independently in order to participate in OOB activity.  Pt will ambulate 350 ft mod I with LRAD in order to participate in daily tasks.                        PLAN:    Patient to be seen 3 x/week to address the above listed problems  via gait training, therapeutic activities, therapeutic exercises  Plan of Care expires: 07/09/24  Plan of Care reviewed with: patient         06/28/2024

## 2024-06-28 NOTE — PLAN OF CARE
Problem: Adult Inpatient Plan of Care  Goal: Plan of Care Review  Outcome: Met  Goal: Patient-Specific Goal (Individualized)  Outcome: Met  Goal: Absence of Hospital-Acquired Illness or Injury  Outcome: Met  Goal: Optimal Comfort and Wellbeing  Outcome: Met  Goal: Readiness for Transition of Care  Outcome: Met     Problem: Sepsis/Septic Shock  Goal: Optimal Coping  Outcome: Met  Goal: Absence of Bleeding  Outcome: Met  Goal: Blood Glucose Level Within Targeted Range  Outcome: Met  Goal: Absence of Infection Signs and Symptoms  Outcome: Met  Goal: Optimal Nutrition Intake  Outcome: Met     Problem: Pneumonia  Goal: Fluid Balance  Outcome: Met  Goal: Resolution of Infection Signs and Symptoms  Outcome: Met  Goal: Effective Oxygenation and Ventilation  Outcome: Met     Problem: Infection  Goal: Absence of Infection Signs and Symptoms  Outcome: Met     Problem: Skin Injury Risk Increased  Goal: Skin Health and Integrity  Outcome: Met     Problem: Fall Injury Risk  Goal: Absence of Fall and Fall-Related Injury  Outcome: Met     Problem: Pain Acute  Goal: Optimal Pain Control and Function  Outcome: Met     Problem: Gas Exchange Impaired  Goal: Optimal Gas Exchange  Outcome: Met     Problem: Electrolyte Imbalance  Goal: Electrolyte Balance  Outcome: Met

## 2024-06-28 NOTE — SUBJECTIVE & OBJECTIVE
Review of Systems   All other systems reviewed and are negative.    Objective:     Vital Signs (Most Recent):  Temp: 98.2 °F (36.8 °C) (06/27/24 1943)  Pulse: 78 (06/27/24 1943)  Resp: 18 (06/27/24 2026)  BP: 118/64 (06/27/24 1943)  SpO2: 96 % (06/27/24 1943) Vital Signs (24h Range):  Temp:  [97.7 °F (36.5 °C)-98.7 °F (37.1 °C)] 98.2 °F (36.8 °C)  Pulse:  [60-88] 78  Resp:  [16-20] 18  SpO2:  [94 %-98 %] 96 %  BP: (111-137)/(53-65) 118/64     Weight: 66.2 kg (145 lb 15.1 oz)  Body mass index is 24.29 kg/m².    Intake/Output Summary (Last 24 hours) at 6/27/2024 2333  Last data filed at 6/27/2024 1058  Gross per 24 hour   Intake 140 ml   Output 200 ml   Net -60 ml         Physical Exam  Vitals and nursing note reviewed.   Constitutional:       General: She is not in acute distress.     Appearance: Normal appearance. She is normal weight.   Cardiovascular:      Rate and Rhythm: Normal rate and regular rhythm.      Heart sounds: No murmur heard.  Pulmonary:      Effort: Pulmonary effort is normal. No respiratory distress.      Breath sounds: No wheezing.      Comments: 1L NC  Neurological:      Mental Status: She is alert.             Significant Labs: All pertinent labs within the past 24 hours have been reviewed.  Recent Lab Results         06/27/24  0550   06/27/24  0549        Procalcitonin 10.39  Comment: A concentration < 0.25 ng/mL represents a low risk of bacterial   infection.  Procalcitonin may not be accurate among patients with localized   infection, recent trauma or major surgery, immunosuppressed state,   invasive fungal infection, renal dysfunction. Decisions regarding   initiation or continuation of antibiotic therapy should not be based   solely on procalcitonin levels.           Albumin   2.7       ALP   77       ALT   17       Anion Gap   11       AST   22       Baso # 0.02         Basophil % 0.2         Bilirubin Direct   0.1       BILIRUBIN TOTAL   0.3  Comment: For infants and newborns,  interpretation of results should be based  on gestational age, weight and in agreement with clinical  observations.    Premature Infant recommended reference ranges:  Up to 24 hours.............<8.0 mg/dL  Up to 48 hours............<12.0 mg/dL  3-5 days..................<15.0 mg/dL  6-29 days.................<15.0 mg/dL         BUN   9       Calcium   9.1       Chloride   107       CO2   24       Creatinine   0.7       Differential Method Automated         eGFR   >60       Eos # 0.1         Eos % 0.9         Glucose   87       Gran # (ANC) 6.6         Gran % 76.8         Hematocrit 26.7         Hemoglobin 8.7         Immature Grans (Abs) 0.11  Comment: Mild elevation in immature granulocytes is non specific and   can be seen in a variety of conditions including stress response,   acute inflammation, trauma and pregnancy. Correlation with other   laboratory and clinical findings is essential.           Immature Granulocytes 1.3         Lymph # 1.1         Lymph % 12.6         Magnesium    1.5       MCH 28.3         MCHC 32.6         MCV 87         Mono # 0.7         Mono % 8.2         MPV 11.2         nRBC 0         Platelet Count 160         Potassium   3.4       PROTEIN TOTAL   5.8       RBC 3.07         RDW 15.1         Sodium   142       WBC 8.64                 Significant Imaging: I have reviewed all pertinent imaging results/findings within the past 24 hours.    CT Chest Without Contrast   Final Result      Bilateral mixed pulmonary opacity and small pleural effusions         Electronically signed by: Dominique Shirley   Date:    06/24/2024   Time:    23:14      X-Ray Chest 1 View   Final Result      Satisfactory line placement radiograph         Electronically signed by: Dominique Shirley   Date:    06/24/2024   Time:    16:53      X-Ray Chest AP Portable   Final Result      As above.         Electronically signed by: Andrea Rainey   Date:    06/24/2024   Time:    12:32

## 2024-06-28 NOTE — RESPIRATORY THERAPY
"Instructions for measuring Oxygen Saturation  to qualify for Home Oxygen:     Please obtain and document (REPLACE # SIGNS AND COPY AND PASTE TEXT INSIDE QUOTATION MARKS) the following for Home Oxygen:     This must be performed and documented within 2 days of discharge.     "Pulse Oximetry:   96% SpO2 on room air at rest on 6/28/2024                                 If 88% or less, STOP and document.     If 89% or more, measure and  document the following:     "Pulse Oximetry:   96% SpO2 on room air at rest on 6/28/2024                              92% SpO2 on room air with activity/exercise on 6/28/2024        (NOTE:  FOR OXYGEN WITH ACTIVITY - MEDICARE WANTS TO SEE THAT THE OXYGEN INCREASES ONCE A PATIENT HAS WALKED AND IS BACK ON THE OXYGEN)    "

## 2024-06-28 NOTE — PLAN OF CARE
A232/A232 KARLACali Stanley is a 74 y.o.female admitted on 6/24/2024 for Septic shock   Code Status: Full Code MRN: [id number]   Review of patient's allergies indicates:   Allergen Reactions    Amitriptyline     Amoxicillin-pot clavulanate Other (See Comments)    Sulfa (sulfonamide antibiotics)      Past Medical History:   Diagnosis Date    Chronic headaches     Depression     Diverticulitis     GERD (gastroesophageal reflux disease)     Hypercholesteremia     Hypertension     ILD (interstitial lung disease)     Migraine headache       PRN meds    0.9% NaCl, , PRN  acetaminophen, 650 mg, Q6H PRN  albuterol-ipratropium, 3 mL, Q4H PRN  butalbital-acetaminophen-caffeine -40 mg, 1 tablet, Q6H PRN  guaiFENesin 100 mg/5 ml, 200 mg, Q4H PRN  ondansetron, 8 mg, Q6H PRN  oxyCODONE-acetaminophen, 1 tablet, Q6H PRN  promethazine, 12.5 mg, Q6H PRN  traZODone, 200 mg, Nightly PRN      Chart check completed. Will continue plan of care.      Orientation: oriented x 4  [place] Coma Scale Score: 15     Lead Monitored: Lead II Rhythm: normal sinus rhythm Frequency/Ectopy: PVCs  Cardiac/Telemetry Box Number: [id number]  VTE Required Core Measure: Pharmacological prophylaxis initiated/maintained Last Bowel Movement: 06/26/24  Diet Cardiac Standard Tray  Voiding Characteristics: voids spontaneously without difficulty  Jackson Score: 19  Fall Risk Score: 15  Accucheck []   Freq?      Lines/Drains/Airways       Peripheral Intravenous Line  Duration                  Peripheral IV - Single Lumen 06/27/24 1420 20 G Anterior;Proximal;Right Forearm <1 day

## 2024-06-29 LAB
BACTERIA BLD CULT: NORMAL
BACTERIA BLD CULT: NORMAL
HISTOPLASMA/BLASTOMYCES AG VALUE: NOT DETECTED NG/ML
HISTOPLASMA/BLASTOMYCES RESULT: NOT DETECTED

## 2024-07-01 ENCOUNTER — TELEPHONE (OUTPATIENT)
Dept: ADMINISTRATIVE | Facility: CLINIC | Age: 74
End: 2024-07-01
Payer: MEDICARE

## 2024-07-01 DIAGNOSIS — J84.9 INTERSTITIAL LUNG DISEASE: ICD-10-CM

## 2024-07-01 LAB
ANCA AB TITR SER IF: NORMAL TITER
P-ANCA TITR SER IF: NORMAL TITER

## 2024-07-01 RX ORDER — PREDNISONE 10 MG/1
10 TABLET ORAL DAILY
Qty: 14 TABLET | Refills: 0 | OUTPATIENT
Start: 2024-07-01

## 2024-07-01 RX ORDER — PREDNISONE 10 MG/1
TABLET ORAL
Qty: 50 TABLET | Refills: 0 | Status: SHIPPED | OUTPATIENT
Start: 2024-07-01 | End: 2024-07-21

## 2024-07-01 NOTE — TELEPHONE ENCOUNTER
----- Message from Fidelia Washington sent at 7/1/2024  9:05 AM CDT -----  Contact: Gaby  .Type:  RX Refill Request    Who Called:  Jose /    Refill or New Rx: Refill    RX Name and Strength: predniSONE (DELTASONE) 10 MG tablet   How is the patient currently taking it? (ex. 1XDay): As prescribed   Is this a 30 day or 90 day RX: 30   Preferred Pharmacy with phone number: .  [facility] - [place] - [address]  [address] [place] [address]  Phone: [phone] Fax: [phone]      Local or Mail Order: local   Ordering Provider: MARY Knowles   Would the patient rather a call back or a response via [facility]?  Call   Best Call Back Number:.[phone]   Additional Information:

## 2024-07-01 NOTE — PROGRESS NOTES
"Phoned patient in response to reply of "2" to post-discharge texting tracker. Ms. Stanley states that she was instructed to continue prednisone at home but did not receive a new prescription. Upon review, prednisone is listed in her discharge summary under the heading "continue taking these medications...", but there is no record of a new RX. Upon further review, Ms. Stanley was taking while in the hospital. Sent secure chat message to discharging provider asking for clarification regarding the prednisone and a new RX to be sent to the [facility] on file if patient is to continue taking at home. Provider replied that the patient was supposed to receive a prednisone taper prescription at discharge. E-RX sent to the [facility] on file. Phoned Ms. Richardson to inform that the prescription had been sent. She verbalized understanding.  "

## 2024-07-10 ENCOUNTER — OFFICE VISIT (OUTPATIENT)
Dept: INFECTIOUS DISEASES | Facility: CLINIC | Age: 74
End: 2024-07-10
Payer: MEDICARE

## 2024-07-10 VITALS
DIASTOLIC BLOOD PRESSURE: 60 MMHG | SYSTOLIC BLOOD PRESSURE: 90 MMHG | HEIGHT: 63 IN | BODY MASS INDEX: 26.57 KG/M2 | WEIGHT: 149.94 LBS | HEART RATE: 80 BPM

## 2024-07-10 DIAGNOSIS — J18.9 PNEUMONIA OF BOTH LOWER LOBES DUE TO INFECTIOUS ORGANISM: ICD-10-CM

## 2024-07-10 DIAGNOSIS — K21.9 GASTROESOPHAGEAL REFLUX DISEASE, UNSPECIFIED WHETHER ESOPHAGITIS PRESENT: ICD-10-CM

## 2024-07-10 DIAGNOSIS — R19.7 DIARRHEA, UNSPECIFIED TYPE: Primary | ICD-10-CM

## 2024-07-10 DIAGNOSIS — J84.9 INTERSTITIAL LUNG DISEASE: ICD-10-CM

## 2024-07-10 PROCEDURE — 1101F PT FALLS ASSESS-DOCD LE1/YR: CPT | Mod: CPTII,S$GLB,, | Performed by: STUDENT IN AN ORGANIZED HEALTH CARE EDUCATION/TRAINING PROGRAM

## 2024-07-10 PROCEDURE — 99214 OFFICE O/P EST MOD 30 MIN: CPT | Mod: S$GLB,,, | Performed by: STUDENT IN AN ORGANIZED HEALTH CARE EDUCATION/TRAINING PROGRAM

## 2024-07-10 PROCEDURE — 99999 PR PBB SHADOW E&M-EST. PATIENT-LVL IV: CPT | Mod: PBBFAC,,, | Performed by: STUDENT IN AN ORGANIZED HEALTH CARE EDUCATION/TRAINING PROGRAM

## 2024-07-10 PROCEDURE — 3008F BODY MASS INDEX DOCD: CPT | Mod: CPTII,S$GLB,, | Performed by: STUDENT IN AN ORGANIZED HEALTH CARE EDUCATION/TRAINING PROGRAM

## 2024-07-10 PROCEDURE — 1126F AMNT PAIN NOTED NONE PRSNT: CPT | Mod: CPTII,S$GLB,, | Performed by: STUDENT IN AN ORGANIZED HEALTH CARE EDUCATION/TRAINING PROGRAM

## 2024-07-10 PROCEDURE — 3078F DIAST BP <80 MM HG: CPT | Mod: CPTII,S$GLB,, | Performed by: STUDENT IN AN ORGANIZED HEALTH CARE EDUCATION/TRAINING PROGRAM

## 2024-07-10 PROCEDURE — 3074F SYST BP LT 130 MM HG: CPT | Mod: CPTII,S$GLB,, | Performed by: STUDENT IN AN ORGANIZED HEALTH CARE EDUCATION/TRAINING PROGRAM

## 2024-07-10 PROCEDURE — 3288F FALL RISK ASSESSMENT DOCD: CPT | Mod: CPTII,S$GLB,, | Performed by: STUDENT IN AN ORGANIZED HEALTH CARE EDUCATION/TRAINING PROGRAM

## 2024-07-10 PROCEDURE — 1159F MED LIST DOCD IN RCRD: CPT | Mod: CPTII,S$GLB,, | Performed by: STUDENT IN AN ORGANIZED HEALTH CARE EDUCATION/TRAINING PROGRAM

## 2024-07-10 NOTE — PROGRESS NOTES
"Infectious Disease Clinic Note    Patient Name: Gaby Stanley  YOB: 1950    PRESENTING HISTORY       History of Present Illness:  Ms. Gaby Stanley is a 74 y.o. female w/ significant PMHx of HTN, GERD, and HLD hospitalized back in Feb for pneumonia and followed outpatient for suspected ILD/scarring of lungs who presented 6/24 with cough, dyspnea, and diarrhea. Was hypotensive in [place] and transferred to  for pressors. CXR with LLL and RML opacification. Off pressors as of 6/25 and deemed stable for the medicine floor. Due to diarrhea, C diff was tested and resulted with positive antigen but negative toxins and PCR. Started empirically on PO vancomycin. Has also been on vancomycin and ceftriaxone for pneumonia coverage. Afebrile with mild leukocytosis. ID consulted for indeterminate C diff results.     7/10: Here for hospital follow up. Admission notes reviewed. Weaned off oxygen prior to discharge. "Plan to complete total 10 day antibiotic course, including inpatient stay. Transition to PO antibiotics on discharge - Keflex and PO vancomycin." With regard to diarrhea, today patient reports stool has formed. Has not been watery since discharge. Takes probiotic. Discussed risks and methods of prevention for C diff. Has had low running BP. Currently asymptomatic. Advised she not take amlodipine unless top number >130. Even then would only take half a pill. If remains 90 systolic or lower at home advised her to go to ER. Voiced understanding.       C. diff Antigen Negative Positive Abnormal    C difficile Toxins A+B, EIA Negative Negative   Comment: Testing not recommended for children <24 months old.   Resulting Agency  [facility]              Specimen Collected: 06/25/24 12:32 CDT Last Resulted: 06/25/24 13:18 CDT                 C. diff PCR Negative Negative   Resulting Agency  [facility]              Specimen Collected: 06/25/24 12:32 CDT Last Resulted: 06/26/24 04:21 CDT             Review of " Systems:  Constitutional: no fever or chills  Eyes: no visual changes  ENT: no nasal congestion or sore throat  Respiratory: no cough or shortness of breath  Cardiovascular: no chest pain  Gastrointestinal: no nausea or vomiting, no abdominal pain, no constipation, no diarrhea  Genitourinary: no hematuria or dysuria  Musculoskeletal: no arthralgias or myalgias  Skin: no rash  Neurological: no headaches, numbness, or paresthesias    The following portions of the patient's history were reviewed and updated as appropriate: allergies, current medications, past family history, past medical history, past social history, past surgical history, and problem list.    PAST HISTORY:     Immunization History   Administered Date(s) Administered    COVID-19, MRNA, LN-S, PF (MODERNA FULL 0.5 ML DOSE) 06/24/2021, 07/22/2021    Influenza - High Dose - PF (65 years and older) 11/15/2018, 11/04/2019    Influenza - Quadrivalent - High Dose - PF (65 years and older) 10/07/2020, 11/17/2022    Pneumococcal Conjugate - 13 Valent 06/29/2018    Pneumococcal Polysaccharide - 23 Valent 08/26/2019       Past Medical History:   Diagnosis Date    Chronic headaches     Depression     Diverticulitis     GERD (gastroesophageal reflux disease)     Hypercholesteremia     Hypertension     ILD (interstitial lung disease)     Migraine headache        Past Surgical History:   Procedure Laterality Date    ABDOMINAL SURGERY      APPENDECTOMY      CATARACT EXTRACTION      EYE SURGERY      HYSTERECTOMY      TONSILLECTOMY         No family history on file.    Social History     Socioeconomic History    Marital status:    Tobacco Use    Smoking status: Former    Smokeless tobacco: Never   Substance and Sexual Activity    Alcohol use: No    Drug use: No    Sexual activity: Yes     Social Determinants of Health     Financial Resource Strain: Low Risk  (6/26/2024)    Overall Financial Resource Strain (CARDIA)     Difficulty of Paying Living Expenses: Not  hard at all   Food Insecurity: No Food Insecurity (6/26/2024)    Hunger Vital Sign     Worried About Running Out of Food in the Last Year: Never true     Ran Out of Food in the Last Year: Never true   Transportation Needs: No Transportation Needs (6/26/2024)    TRANSPORTATION NEEDS     Transportation : No   Physical Activity: Inactive (9/20/2023)    Received from [facility], [facility]    Exercise Vital Sign     Days of Exercise per Week: 0 days     Minutes of Exercise per Session: 0 min   Stress: No Stress Concern Present (6/26/2024)    Citizen of [place] [employer] - Occupational Stress Questionnaire     Feeling of Stress : Only a little   Housing Stability: Low Risk  (6/26/2024)    Housing Stability Vital Sign     Unable to Pay for Housing in the Last Year: No     Homeless in the Last Year: No       MEDICATIONS & ALLERGIES:     Current Outpatient Medications on File Prior to Visit   Medication Sig    amLODIPine (NORVASC) 10 MG tablet Take 1 tablet by mouth every morning.    butalbital-acetaminophen-caffeine -40 mg (FIORICET, ESGIC) -40 mg per tablet Take 1 tablet by mouth every 6 (six) hours as needed for Pain.    cyanocobalamin 1,000 mcg/mL injection Inject 1,000 mcg into the muscle.    iron-vit c-b12-folic acid (ICAR-C PLUS) Tab Take 1 tablet by mouth every morning.    omeprazole (PRILOSEC) 10 MG capsule Take 40 mg by mouth once daily.    ondansetron (ZOFRAN) 4 MG tablet Take 4 mg by mouth every 8 (eight) hours as needed.    pantoprazole (PROTONIX) 40 MG tablet Take 40 mg by mouth 2 (two) times daily.     predniSONE (DELTASONE) 10 MG tablet Take 1 tablet (10 mg total) by mouth once daily.    predniSONE (DELTASONE) 10 MG tablet Take 4 tablets (40 mg total) by mouth once daily for 5 days, THEN 3 tablets (30 mg total) once daily for 5 days,  "THEN 2 tablets (20 mg total) once daily for 5 days, THEN 1 tablet (10 mg total) once daily for 5 days.    promethazine (PHENERGAN) 25 MG tablet Take 1 tablet (25 mg total) by mouth every 6 (six) hours as needed for Nausea.    ROSUVASTATIN CALCIUM (CRESTOR ORAL) Take by mouth.    sertraline (ZOLOFT) 100 MG tablet Take 100 mg by mouth once daily.    SUMATRIPTAN SUCCINATE (IMITREX ORAL) Take by mouth as needed.     traZODone (DESYREL) 100 MG tablet Take 2 tablets by mouth every evening.    azelastine (ASTELIN) 137 mcg (0.1 %) nasal spray 1 spray (137 mcg total) by Nasal route 2 (two) times daily. (Patient not taking: Reported on 7/10/2024)    lipase-protease-amylase (ZENPEP) 25,000-85,000- 136,000 unit CpDR Take by mouth before meals and at bedtime as needed. (Patient not taking: Reported on 7/10/2024)    oxyCODONE-acetaminophen (PERCOCET) 5-325 mg per tablet Take 1 tablet by mouth every 8 (eight) hours as needed for Pain. (Patient not taking: Reported on 7/10/2024)     No current facility-administered medications on file prior to visit.       Review of patient's allergies indicates:   Allergen Reactions    Amitriptyline     Amoxicillin-pot clavulanate Other (See Comments)    Sulfa (sulfonamide antibiotics)        OBJECTIVE:   Vital Signs:  Vitals:    07/10/24 1258   BP: 90/60   Pulse: 80   Weight: 68 kg (149 lb 14.6 oz)   Height: 5' 3" (1.6 m)       No results found for this or any previous visit (from the past 24 hour(s)).      Physical Exam:   General:  Well developed, well nourished, no acute distress  HEENT:  Normocephalic, atraumatic  CVS:  RRR, S1 and S2 normal, no murmurs, rubs, gallops  Resp:  Lungs clear to auscultation, no wheezes, rales, rhonchi  GI:  Abdomen soft, non-tender, non-distended, normoactive bowel sounds, no masses  MSK:  No muscle atrophy, peripheral edema, full range of motion  Skin:  No rashes, ulcers, erythema  Psych:  Alert and oriented to person, place, and time    ASSESSMENT: "     Interstitial lung disease  Needs continued pulmonology follow up      Pneumonia of both lower lobes due to infectious organism  Completed empiric ceftriaxone for 5 day course.      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 but does not have active infection  --Would not document this as a first time C diff episode   --Has completed empiric course of PO vancomycin 125 mg QID for 10 days especially since patient being treated with beta lactam for pneumonia which poses risk of activating C diff toxins   --No indication for special isolation precautions   --Patient educated on importance of washing hands with soap and water at least 20 seconds   --Chance always exists of carrier becoming actively infected with C diff  --C diff diet handout provided   --Follow up with ID as needed      GERD (gastroesophageal reflux disease)  Continue current medications and follow up with PCP       PLAN:     Diagnoses and all orders for this visit:    Diarrhea, unspecified type    Interstitial lung disease    Gastroesophageal reflux disease, unspecified whether esophagitis present    Pneumonia of both lower lobes due to infectious organism      The total time for evaluation and management services performed on 7/10/24 was greater than 20 minutes.        Walter Rothman, DO   Infectious Diseases

## 2024-07-10 NOTE — PATIENT INSTRUCTIONS
Foods to eat (compliant) and foods to avoid (non compliant)   Compliant Foods Non Compliant Foods   Fermented foods such as yogurt, sauerkraut, tempeh (fermented soybeans), miso (fermented soybean paste) Fatty foods (such as mayonnaise or fatty meats)   Foods high in soluble fiber Foods high In insoluble fiber   Vegetables (non-cruciferous) cooked into soups or mixed into smoothies (such as green beans, zucchinis, and cucumbers) Raw vegetables    Cruciferous vegetables (such as cabbage, cauliflower, [place] sprouts, broccoli), turnips, beets, carrots, and cabbage    Peas (have both soluble and insoluble fiber, but may be best to avoid due to insoluble fiber content)   Lean protein sources (such as turkey, chicken, and eggs) Fried or greasy foods   Oats, oat bran, oatmeal, rice bran Whole wheat, wheat bran, wheat cereals   Barley Rye   Bananas Prunes, prune juice, dates   Natural applesauce (no added sugar) Unpeeled apples, blackberries, blueberries, and raspberries   Fruits such as citrus fruits, melons, peaches, cherries, strawberries, and watermelon Spicy foods   Lentils, beans Nuts and seeds (including nut butter)   Low-fiber cereal (such as Rice Krispies) Sugar substitutes like sorbitol and xylitol   Finely ground flaxseeds (not whole) Unnatural oils (such as margarine, [place], or Olestra)   Starchy, easy to digest foods, like potatoes, noodles, crackers and white rice Large amounts of sweet foods, such as cakes and cookies (the sugar and fat may worsen diarrhea)   Lots of water and liquids to replenish water loss from diarrhea (such as soup and soup broth)

## 2024-07-24 ENCOUNTER — OFFICE VISIT (OUTPATIENT)
Dept: PULMONOLOGY | Facility: CLINIC | Age: 74
End: 2024-07-24
Payer: MEDICARE

## 2024-07-24 VITALS
SYSTOLIC BLOOD PRESSURE: 130 MMHG | HEART RATE: 88 BPM | BODY MASS INDEX: 25.98 KG/M2 | DIASTOLIC BLOOD PRESSURE: 72 MMHG | HEIGHT: 63 IN | OXYGEN SATURATION: 97 % | WEIGHT: 146.63 LBS

## 2024-07-24 DIAGNOSIS — D89.89 OTHER SPECIFIED DISORDERS INVOLVING THE IMMUNE MECHANISM, NOT ELSEWHERE CLASSIFIED: Primary | ICD-10-CM

## 2024-07-24 DIAGNOSIS — J84.9 INTERSTITIAL PULMONARY DISEASE, UNSPECIFIED: ICD-10-CM

## 2024-07-24 PROCEDURE — 3008F BODY MASS INDEX DOCD: CPT | Mod: CPTII,S$GLB,, | Performed by: INTERNAL MEDICINE

## 2024-07-24 PROCEDURE — 1159F MED LIST DOCD IN RCRD: CPT | Mod: CPTII,S$GLB,, | Performed by: INTERNAL MEDICINE

## 2024-07-24 PROCEDURE — 1160F RVW MEDS BY RX/DR IN RCRD: CPT | Mod: CPTII,S$GLB,, | Performed by: INTERNAL MEDICINE

## 2024-07-24 PROCEDURE — 99999 PR PBB SHADOW E&M-EST. PATIENT-LVL IV: CPT | Mod: PBBFAC,,, | Performed by: INTERNAL MEDICINE

## 2024-07-24 PROCEDURE — 3078F DIAST BP <80 MM HG: CPT | Mod: CPTII,S$GLB,, | Performed by: INTERNAL MEDICINE

## 2024-07-24 PROCEDURE — 1111F DSCHRG MED/CURRENT MED MERGE: CPT | Mod: CPTII,S$GLB,, | Performed by: INTERNAL MEDICINE

## 2024-07-24 PROCEDURE — 1101F PT FALLS ASSESS-DOCD LE1/YR: CPT | Mod: CPTII,S$GLB,, | Performed by: INTERNAL MEDICINE

## 2024-07-24 PROCEDURE — 3075F SYST BP GE 130 - 139MM HG: CPT | Mod: CPTII,S$GLB,, | Performed by: INTERNAL MEDICINE

## 2024-07-24 PROCEDURE — 3288F FALL RISK ASSESSMENT DOCD: CPT | Mod: CPTII,S$GLB,, | Performed by: INTERNAL MEDICINE

## 2024-07-24 PROCEDURE — 99214 OFFICE O/P EST MOD 30 MIN: CPT | Mod: S$GLB,,, | Performed by: INTERNAL MEDICINE

## 2024-07-24 RX ORDER — VANCOMYCIN HYDROCHLORIDE 125 MG/1
125 CAPSULE ORAL 2 TIMES DAILY
Qty: 180 CAPSULE | Refills: 1 | Status: SHIPPED | OUTPATIENT
Start: 2024-07-24 | End: 2025-01-20

## 2024-07-25 ENCOUNTER — TELEPHONE (OUTPATIENT)
Dept: INFECTIOUS DISEASES | Facility: CLINIC | Age: 74
End: 2024-07-25
Payer: MEDICARE

## 2024-07-25 ENCOUNTER — PATIENT MESSAGE (OUTPATIENT)
Dept: INFECTIOUS DISEASES | Facility: CLINIC | Age: 74
End: 2024-07-25
Payer: MEDICARE

## 2024-07-25 NOTE — TELEPHONE ENCOUNTER
----- Message from Reji Conte sent at 7/25/2024  3:02 PM CDT -----  .Type:  Needs Medical Advice    Who Called: pt    Would the patient rather a call back or a response via [facility]? Call back  Best Call Back Number: [phone]  Additional Information:     Pt stated she would like a call back because she need a PA on her medication

## 2024-07-26 ENCOUNTER — TELEPHONE (OUTPATIENT)
Dept: INFECTIOUS DISEASES | Facility: CLINIC | Age: 74
End: 2024-07-26
Payer: MEDICARE

## 2024-07-26 DIAGNOSIS — R35.0 FREQUENCY OF MICTURITION: Primary | ICD-10-CM

## 2024-07-26 NOTE — TELEPHONE ENCOUNTER
Per Dr. Rothman, orders placed for vanco to prevent rec. Cdiff while on Keflex. Pt verbalized understanding.

## 2024-07-26 NOTE — PROGRESS NOTES
"Subjective:      Patient ID: Gaby Stanley is a 74 y.o. female.    Chief Complaint: Follow-up    Follow-up        73 year old female with history of depression, diverticulitis, GERD, HLD, HTN who is referred to Pulmonary clinic by Rachel Soto NP for hospital follow up recent admission for pneumonia.      Brief Admission Summary:   Admitted to hospital in June with sepsis/septic shock. Stool + for C. Difficile. Treated with oral vanco. No ongoing GI issues per patient      COVID June 2022- no hospitalization, no oxygen  Relapsing and remitting dry cough since at least September, possibly sooner  [place]  Quit smoking over 40 years ago, smoked 15 years     Hospital work up:  KYE positive- homogenous  Fungitell Positive (294)  Fungal immunodiffusion- negative (cocci, aspergillus, blasto, histo)  RF negative  ANCA negative    July 2024  - Here for f/u of recent hospital stay  - CT chest done while inpatient  - Showed improvement of ground glass with diffuse underlying interstitial fibrotic changes  - Finished prednisone taper  - Some complaints of cough  - Denies dyspnea  - No fever    Review of Systems as per HPI otherwise negative    Objective:     Physical Exam   Constitutional: She is oriented to person, place, and time. She appears well-developed. No distress.   HENT:   Head: Normocephalic.   Cardiovascular: Normal rate and regular rhythm.   Pulmonary/Chest: Normal expansion, symmetric chest wall expansion and effort normal. She has rales.   Musculoskeletal:      Cervical back: Neck supple.   Neurological: She is alert and oriented to person, place, and time.   Psychiatric: She has a normal mood and affect.   Nursing note and vitals reviewed.          7/24/2024     2:53 PM 7/10/2024    12:58 PM 6/28/2024    12:34 PM 6/28/2024    10:22 AM 6/28/2024     9:00 AM 6/28/2024     7:57 AM 6/28/2024     7:13 AM   Pulmonary Function Tests   SpO2 97 %  93 % 100 % 100 % 100 % 97 %   Height 5' 3" (1.6 m) 5' 3" " (1.6 m)        Weight 66.5 kg (146 lb 9.7 oz) 68 kg (149 lb 14.6 oz)        BMI (Calculated) 26 26.6             Assessment:     1. Other specified disorders involving the immune mechanism, not elsewhere classified    2. Interstitial pulmonary disease, unspecified         Orders Placed This Encounter   Procedures    CT Chest Without Contrast     Standing Status:   Future     Standing Expiration Date:   7/24/2025     Order Specific Question:   May the Radiologist modify the order per protocol to meet the clinical needs of the patient?     Answer:   Yes     COMPARISON:  Radiographic correlation     FINDINGS:  Dependent small volume pleural effusions.  Bilateral reticular interstitial as well as airspace opacity involving all lobes.     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

## 2024-07-26 NOTE — TELEPHONE ENCOUNTER
----- Message from Gabbie Arguelles sent at 7/24/2024  9:31 AM CDT -----  Contact: JACK BENEDICT [[id number]]  ..Type:  Patient Requesting Call    Who Called: JACK BENEDICT [[id number]]  Does the patient know what this is regarding?: pt wants to discuss medications from hospital f/u, pt saw provider a couple of weeks ago and was told to contact the provider if she started any new antibiotics   Would the patient rather a call back or a response via [facility]?  call  Best Call Back Number: [phone]   Additional Information:

## 2024-07-26 NOTE — TELEPHONE ENCOUNTER
spoke with patient regarding keflex and vancomycin. Told patient if she stops keflex no need for vanco. Told patient hold off on vanco until Monday when he retests to see if he has another alternative for her

## 2024-08-05 ENCOUNTER — OFFICE VISIT (OUTPATIENT)
Dept: INTERNAL MEDICINE | Facility: CLINIC | Age: 74
End: 2024-08-05
Payer: MEDICARE

## 2024-08-05 VITALS
BODY MASS INDEX: 25.39 KG/M2 | OXYGEN SATURATION: 96 % | SYSTOLIC BLOOD PRESSURE: 90 MMHG | WEIGHT: 143.31 LBS | HEIGHT: 63 IN | TEMPERATURE: 97 F | DIASTOLIC BLOOD PRESSURE: 62 MMHG | HEART RATE: 87 BPM

## 2024-08-05 DIAGNOSIS — I10 PRIMARY HYPERTENSION: Primary | ICD-10-CM

## 2024-08-05 DIAGNOSIS — F41.9 ANXIETY: ICD-10-CM

## 2024-08-05 DIAGNOSIS — J84.9 INTERSTITIAL LUNG DISEASE: ICD-10-CM

## 2024-08-05 DIAGNOSIS — J18.9 PNEUMONIA OF BOTH LOWER LOBES DUE TO INFECTIOUS ORGANISM: ICD-10-CM

## 2024-08-05 DIAGNOSIS — S51.812A SKIN TEAR OF FOREARM WITHOUT COMPLICATION, LEFT, INITIAL ENCOUNTER: ICD-10-CM

## 2024-08-05 PROCEDURE — 3008F BODY MASS INDEX DOCD: CPT | Mod: CPTII,S$GLB,, | Performed by: NURSE PRACTITIONER

## 2024-08-05 PROCEDURE — 1159F MED LIST DOCD IN RCRD: CPT | Mod: CPTII,S$GLB,, | Performed by: NURSE PRACTITIONER

## 2024-08-05 PROCEDURE — 1101F PT FALLS ASSESS-DOCD LE1/YR: CPT | Mod: CPTII,S$GLB,, | Performed by: NURSE PRACTITIONER

## 2024-08-05 PROCEDURE — 99999 PR PBB SHADOW E&M-EST. PATIENT-LVL IV: CPT | Mod: PBBFAC,,, | Performed by: NURSE PRACTITIONER

## 2024-08-05 PROCEDURE — 99214 OFFICE O/P EST MOD 30 MIN: CPT | Mod: S$GLB,,, | Performed by: NURSE PRACTITIONER

## 2024-08-05 PROCEDURE — 1126F AMNT PAIN NOTED NONE PRSNT: CPT | Mod: CPTII,S$GLB,, | Performed by: NURSE PRACTITIONER

## 2024-08-05 PROCEDURE — 3078F DIAST BP <80 MM HG: CPT | Mod: CPTII,S$GLB,, | Performed by: NURSE PRACTITIONER

## 2024-08-05 PROCEDURE — 3074F SYST BP LT 130 MM HG: CPT | Mod: CPTII,S$GLB,, | Performed by: NURSE PRACTITIONER

## 2024-08-05 PROCEDURE — 1160F RVW MEDS BY RX/DR IN RCRD: CPT | Mod: CPTII,S$GLB,, | Performed by: NURSE PRACTITIONER

## 2024-08-05 PROCEDURE — 3288F FALL RISK ASSESSMENT DOCD: CPT | Mod: CPTII,S$GLB,, | Performed by: NURSE PRACTITIONER

## 2024-08-05 RX ORDER — PROMETHAZINE HYDROCHLORIDE AND DEXTROMETHORPHAN HYDROBROMIDE 6.25; 15 MG/5ML; MG/5ML
5 SYRUP ORAL EVERY 8 HOURS PRN
Qty: 118 ML | Refills: 0 | Status: SHIPPED | OUTPATIENT
Start: 2024-08-05 | End: 2024-08-13

## 2024-08-05 RX ORDER — SUMATRIPTAN SUCCINATE 100 MG/1
100 TABLET ORAL
COMMUNITY
Start: 2024-07-17

## 2024-08-05 RX ORDER — CEFUROXIME AXETIL 250 MG/1
6 TABLET ORAL
COMMUNITY

## 2024-08-05 RX ORDER — MUPIROCIN 20 MG/G
OINTMENT TOPICAL 3 TIMES DAILY
Qty: 22 G | Refills: 0 | Status: SHIPPED | OUTPATIENT
Start: 2024-08-05 | End: 2024-08-12

## 2024-08-20 ENCOUNTER — HOSPITAL ENCOUNTER (OUTPATIENT)
Dept: RADIOLOGY | Facility: HOSPITAL | Age: 74
Discharge: HOME | End: 2024-08-20
Attending: UROLOGY
Payer: MEDICARE

## 2024-08-20 DIAGNOSIS — R35.0 FREQUENCY OF MICTURITION: ICD-10-CM

## 2024-08-20 PROCEDURE — 25500020 PHARM REV CODE 255: Mod: PO | Performed by: UROLOGY

## 2024-08-20 PROCEDURE — 74178 CT ABD&PLV WO CNTR FLWD CNTR: CPT | Mod: 26,,, | Performed by: RADIOLOGY

## 2024-08-20 PROCEDURE — 74178 CT ABD&PLV WO CNTR FLWD CNTR: CPT | Mod: TC,PO

## 2024-08-20 RX ADMIN — IOHEXOL 100 ML: 350 INJECTION, SOLUTION INTRAVENOUS at 09:08

## 2024-09-12 ENCOUNTER — OFFICE VISIT (OUTPATIENT)
Dept: INTERNAL MEDICINE | Facility: CLINIC | Age: 74
End: 2024-09-12
Payer: MEDICARE

## 2024-09-12 VITALS
RESPIRATION RATE: 18 BRPM | OXYGEN SATURATION: 98 % | DIASTOLIC BLOOD PRESSURE: 80 MMHG | HEIGHT: 63 IN | TEMPERATURE: 98 F | BODY MASS INDEX: 26.45 KG/M2 | HEART RATE: 61 BPM | WEIGHT: 149.25 LBS | SYSTOLIC BLOOD PRESSURE: 148 MMHG

## 2024-09-12 DIAGNOSIS — M19.041 PRIMARY OSTEOARTHRITIS OF BOTH HANDS: ICD-10-CM

## 2024-09-12 DIAGNOSIS — R35.0 URINARY FREQUENCY: ICD-10-CM

## 2024-09-12 DIAGNOSIS — M19.042 PRIMARY OSTEOARTHRITIS OF BOTH HANDS: ICD-10-CM

## 2024-09-12 DIAGNOSIS — N30.00 ACUTE CYSTITIS WITHOUT HEMATURIA: Primary | ICD-10-CM

## 2024-09-12 DIAGNOSIS — I10 PRIMARY HYPERTENSION: ICD-10-CM

## 2024-09-12 LAB
BILIRUB UR QL STRIP: NEGATIVE
CLARITY UR REFRACT.AUTO: CLEAR
COLOR UR AUTO: ABNORMAL
GLUCOSE UR QL STRIP: NEGATIVE
HGB UR QL STRIP: NEGATIVE
KETONES UR QL STRIP: NEGATIVE
LEUKOCYTE ESTERASE UR QL STRIP: NEGATIVE
NITRITE UR QL STRIP: NEGATIVE
PH UR STRIP: 7 [PH] (ref 5–8)
PROT UR QL STRIP: NEGATIVE
SP GR UR STRIP: 1.02 (ref 1–1.03)
URN SPEC COLLECT METH UR: ABNORMAL
UROBILINOGEN UR STRIP-ACNC: NEGATIVE EU/DL

## 2024-09-12 PROCEDURE — 1101F PT FALLS ASSESS-DOCD LE1/YR: CPT | Mod: CPTII,S$GLB,, | Performed by: NURSE PRACTITIONER

## 2024-09-12 PROCEDURE — 99214 OFFICE O/P EST MOD 30 MIN: CPT | Mod: S$GLB,,, | Performed by: NURSE PRACTITIONER

## 2024-09-12 PROCEDURE — 1160F RVW MEDS BY RX/DR IN RCRD: CPT | Mod: CPTII,S$GLB,, | Performed by: NURSE PRACTITIONER

## 2024-09-12 PROCEDURE — 99999 PR PBB SHADOW E&M-EST. PATIENT-LVL IV: CPT | Mod: PBBFAC,,, | Performed by: NURSE PRACTITIONER

## 2024-09-12 PROCEDURE — 3008F BODY MASS INDEX DOCD: CPT | Mod: CPTII,S$GLB,, | Performed by: NURSE PRACTITIONER

## 2024-09-12 PROCEDURE — 1126F AMNT PAIN NOTED NONE PRSNT: CPT | Mod: CPTII,S$GLB,, | Performed by: NURSE PRACTITIONER

## 2024-09-12 PROCEDURE — 1159F MED LIST DOCD IN RCRD: CPT | Mod: CPTII,S$GLB,, | Performed by: NURSE PRACTITIONER

## 2024-09-12 PROCEDURE — 3077F SYST BP >= 140 MM HG: CPT | Mod: CPTII,S$GLB,, | Performed by: NURSE PRACTITIONER

## 2024-09-12 PROCEDURE — 3288F FALL RISK ASSESSMENT DOCD: CPT | Mod: CPTII,S$GLB,, | Performed by: NURSE PRACTITIONER

## 2024-09-12 PROCEDURE — 81003 URINALYSIS AUTO W/O SCOPE: CPT | Mod: PO | Performed by: NURSE PRACTITIONER

## 2024-09-12 PROCEDURE — 3079F DIAST BP 80-89 MM HG: CPT | Mod: CPTII,S$GLB,, | Performed by: NURSE PRACTITIONER

## 2024-09-12 PROCEDURE — 87086 URINE CULTURE/COLONY COUNT: CPT | Performed by: NURSE PRACTITIONER

## 2024-09-12 RX ORDER — CIPROFLOXACIN 250 MG/1
250 TABLET, FILM COATED ORAL EVERY 12 HOURS
Qty: 10 TABLET | Refills: 0 | Status: SHIPPED | OUTPATIENT
Start: 2024-09-12 | End: 2024-09-17

## 2024-09-12 RX ORDER — HYDROCODONE BITARTRATE AND ACETAMINOPHEN 5; 325 MG/1; MG/1
1 TABLET ORAL EVERY 8 HOURS PRN
Qty: 15 TABLET | Refills: 0 | Status: SHIPPED | OUTPATIENT
Start: 2024-09-12 | End: 2024-09-17

## 2024-09-12 RX ORDER — METHENAMINE, SODIUM PHOSPHATE, MONOBASIC, MONOHYDRATE, PHENYL SALICYLATE, METHYLENE BLUE, AND HYOSCYAMINE SULFATE 118; 40.8; 36; 10; .12 MG/1; MG/1; MG/1; MG/1; MG/1
1 CAPSULE ORAL 3 TIMES DAILY PRN
Qty: 9 CAPSULE | Refills: 0 | Status: SHIPPED | OUTPATIENT
Start: 2024-09-12 | End: 2024-09-15

## 2024-09-12 NOTE — PROGRESS NOTES
Subjective:       Patient ID: Gaby Stanley is a 74 y.o. female.    Chief Complaint: Urinary Tract Infection (Pressure, frequency)    History of Present Illness             Mrs. Stanley presents to clinic for possible UTI. Started with dysuria, pelvic pressure, and urinary frequency/urgency 2 days prior. Taking Uribel which provides some relief. History frequent UTIs. Followed by Dr. Ortega in urology. Recently completed CT Urogram Abd Pelvis which was unremarkable.    Also c/o acute flare up of chronic arthritis to bilateral hands, R>L. Tried multiple NSAIDs without relief including meloxicam, celebrex, diclofenac oral and topical. Previously seen by hand specialist 6 months prior. Xray reportedly revealed arthritis. Received steroid injection which did not provide relief. Previous flare ups treated with Lortab.       Urinary Tract Infection   This is a new problem. The current episode started in the past 7 days. The problem occurs every urination. The quality of the pain is described as burning. There has been no fever. Associated symptoms include frequency, nausea and urgency. Pertinent negatives include no behavior changes, chills, flank pain, hematuria or vomiting. Her past medical history is significant for recurrent UTIs.       Patient Active Problem List   Diagnosis    Anxiety    Migraine    Hyperlipidemia LDL goal <130    Hypertension    Pernicious anemia    Pneumonia of both lower lobes due to infectious organism    GERD (gastroesophageal reflux disease)    Cough    Abnormal CT of the chest    Interstitial lung disease    Other specified disorders involving the immune mechanism, not elsewhere classified       No family history on file.  Past Surgical History:   Procedure Laterality Date    ABDOMINAL SURGERY      APPENDECTOMY      CATARACT EXTRACTION      EYE SURGERY      HYSTERECTOMY      TONSILLECTOMY           Current Outpatient Medications:     butalbital-acetaminophen-caffeine -40 mg (FIORICET,  ESGIC) -40 mg per tablet, Take 1 tablet by mouth every 6 (six) hours as needed for Pain., Disp: , Rfl:     cyanocobalamin 1,000 mcg/mL injection, Inject 1,000 mcg into the muscle., Disp: , Rfl:     omeprazole (PRILOSEC) 10 MG capsule, Take 40 mg by mouth once daily., Disp: , Rfl:     ondansetron (ZOFRAN) 4 MG tablet, Take 4 mg by mouth every 8 (eight) hours as needed., Disp: , Rfl:     ROSUVASTATIN CALCIUM (CRESTOR ORAL), Take by mouth., Disp: , Rfl:     sertraline (ZOLOFT) 100 MG tablet, Take 200 mg by mouth once daily., Disp: , Rfl:     sumatriptan (IMITREX STATDOSE) 6 mg/0.5 mL kit, Inject 6 mg into the skin every 2 (two) hours as needed., Disp: , Rfl:     sumatriptan (IMITREX) 100 MG tablet, Take 100 mg by mouth every 2 (two) hours as needed., Disp: , Rfl:     traZODone (DESYREL) 100 MG tablet, Take 2 tablets by mouth every evening., Disp: , Rfl:     vancomycin (VANCOCIN) 125 MG capsule, Take 1 capsule (125 mg total) by mouth 2 (two) times a day., Disp: 180 capsule, Rfl: 1    ciprofloxacin HCl (CIPRO) 250 MG tablet, Take 1 tablet (250 mg total) by mouth every 12 (twelve) hours. for 5 days, Disp: 10 tablet, Rfl: 0    HYDROcodone-acetaminophen (NORCO) 5-325 mg per tablet, Take 1 tablet by mouth every 8 (eight) hours as needed for Pain., Disp: 15 tablet, Rfl: 0    methen-m.blue-s.phos-phsal-hyo (URIBEL) 118-10-40.8-36 mg Cap, Take 1 capsule by mouth 3 (three) times daily as needed (bladder pain)., Disp: 9 capsule, Rfl: 0    Review of Systems   Constitutional:  Negative for chills and fever.   Gastrointestinal:  Positive for nausea. Negative for abdominal pain and vomiting.   Genitourinary:  Positive for frequency and urgency. Negative for decreased urine volume, difficulty urinating, flank pain and hematuria.   Musculoskeletal:  Positive for arthralgias and joint swelling. Negative for myalgias.   Neurological:  Positive for weakness (bilateral hands 2/2 pain).       Objective:   BP (!) 148/80 (BP Location:  "Left arm, Patient Position: Sitting, BP Method: Large (Manual))   Pulse 61   Temp 97.9 °F (36.6 °C)   Resp 18   Ht 5' 3" (1.6 m)   Wt 67.7 kg (149 lb 4 oz)   SpO2 98%   BMI 26.44 kg/m²      Physical Exam  Constitutional:       General: She is not in acute distress.     Appearance: Normal appearance. She is not ill-appearing, toxic-appearing or diaphoretic.   HENT:      Head: Normocephalic.   Cardiovascular:      Rate and Rhythm: Normal rate and regular rhythm.      Heart sounds: Normal heart sounds. No murmur heard.  Pulmonary:      Effort: Pulmonary effort is normal. No respiratory distress.      Breath sounds: Normal breath sounds. No wheezing, rhonchi or rales.   Abdominal:      General: There is no distension.      Palpations: Abdomen is soft. There is no mass.      Tenderness: There is no abdominal tenderness. There is no right CVA tenderness, left CVA tenderness, guarding or rebound.   Musculoskeletal:      Comments: Arthritic changes to bilateral hands and fingers, most prominent to right CMC of thumb   Skin:     General: Skin is warm and dry.      Findings: No erythema or rash.   Neurological:      Mental Status: She is alert.         Assessment & Plan     1. Acute cystitis without hematuria  Comments:  Cipro rx. Urine culture pending. Stay well hydrated. Notify clinic if symptoms do not improve or develop fever, N/V, or flank pain.  Orders:  -     ciprofloxacin HCl (CIPRO) 250 MG tablet; Take 1 tablet (250 mg total) by mouth every 12 (twelve) hours. for 5 days  Dispense: 10 tablet; Refill: 0  -     methen-m.blue-s.phos-phsal-hyo (URIBEL) 118-10-40.8-36 mg Cap; Take 1 capsule by mouth 3 (three) times daily as needed (bladder pain).  Dispense: 9 capsule; Refill: 0  -     CULTURE, URINE    2. Primary osteoarthritis of both hands  Comments:   reviewed. No concerns for misuse. Short course norco for acute OA flare up. Reviewed potential side effects. Previously tolerated well. F/U with " "ortho  Orders:  -     HYDROcodone-acetaminophen (NORCO) 5-325 mg per tablet; Take 1 tablet by mouth every 8 (eight) hours as needed for Pain.  Dispense: 15 tablet; Refill: 0    3. Urinary frequency  -     Urinalysis, Reflex to Urine Culture Urine, Clean Catch  -     ciprofloxacin HCl (CIPRO) 250 MG tablet; Take 1 tablet (250 mg total) by mouth every 12 (twelve) hours. for 5 days  Dispense: 10 tablet; Refill: 0  -     CULTURE, URINE    4. Primary hypertension  Assessment & Plan:  BP elevated today in clinic. Amlodipine was previously discontinued due to hypotension. Continue to monitor. Follow up with PCP            CECILY Mejia        Portions of this note may have been created with voice recognition software. Occasional "wrong-word" or "sound-a-like" substitutions may have occurred due to the inherent limitations of voice recognition software. Please, read the note carefully and recognize, using context, where substitutions have occurred.        "

## 2024-09-13 NOTE — ASSESSMENT & PLAN NOTE
BP elevated today in clinic. Amlodipine was previously discontinued due to hypotension. Continue to monitor. Follow up with PCP

## 2024-09-14 LAB — BACTERIA UR CULT: NORMAL

## 2024-09-23 PROBLEM — J18.9 PNEUMONIA OF BOTH LOWER LOBES DUE TO INFECTIOUS ORGANISM: Status: RESOLVED | Noted: 2024-02-17 | Resolved: 2024-09-23

## 2024-12-19 ENCOUNTER — HOSPITAL ENCOUNTER (OUTPATIENT)
Dept: RADIOLOGY | Facility: HOSPITAL | Age: 74
Discharge: HOME | End: 2024-12-19
Attending: INTERNAL MEDICINE
Payer: MEDICARE

## 2024-12-19 DIAGNOSIS — M54.50 LUMBAGO: ICD-10-CM

## 2024-12-19 DIAGNOSIS — M54.50 LUMBAGO: Primary | ICD-10-CM

## 2024-12-19 PROCEDURE — 72100 X-RAY EXAM L-S SPINE 2/3 VWS: CPT | Mod: TC,PO

## 2024-12-19 PROCEDURE — 72100 X-RAY EXAM L-S SPINE 2/3 VWS: CPT | Mod: 26,,, | Performed by: STUDENT IN AN ORGANIZED HEALTH CARE EDUCATION/TRAINING PROGRAM

## 2025-01-23 ENCOUNTER — PATIENT MESSAGE (OUTPATIENT)
Dept: PULMONOLOGY | Facility: CLINIC | Age: 75
End: 2025-01-23
Payer: MEDICARE

## 2025-02-19 ENCOUNTER — LAB VISIT (OUTPATIENT)
Dept: LAB | Facility: HOSPITAL | Age: 75
End: 2025-02-19
Attending: UROLOGY
Payer: MEDICARE

## 2025-02-19 DIAGNOSIS — R39.15 URGENCY OF URINATION: ICD-10-CM

## 2025-02-19 LAB
BACTERIA #/AREA URNS AUTO: ABNORMAL /HPF
BILIRUB UR QL STRIP: NEGATIVE
CLARITY UR REFRACT.AUTO: ABNORMAL
COLOR UR AUTO: ABNORMAL
GLUCOSE UR QL STRIP: NEGATIVE
HGB UR QL STRIP: NEGATIVE
KETONES UR QL STRIP: NEGATIVE
LEUKOCYTE ESTERASE UR QL STRIP: ABNORMAL
MICROSCOPIC COMMENT: ABNORMAL
NITRITE UR QL STRIP: NEGATIVE
PH UR STRIP: 6 [PH] (ref 5–8)
PROT UR QL STRIP: NEGATIVE
SP GR UR STRIP: 1.02 (ref 1–1.03)
URN SPEC COLLECT METH UR: ABNORMAL
UROBILINOGEN UR STRIP-ACNC: NEGATIVE EU/DL
WBC #/AREA URNS AUTO: 12 /HPF (ref 0–5)

## 2025-02-19 PROCEDURE — 87086 URINE CULTURE/COLONY COUNT: CPT | Performed by: UROLOGY

## 2025-02-19 PROCEDURE — 87088 URINE BACTERIA CULTURE: CPT | Performed by: UROLOGY

## 2025-02-19 PROCEDURE — 81000 URINALYSIS NONAUTO W/SCOPE: CPT | Mod: PO | Performed by: UROLOGY

## 2025-02-19 PROCEDURE — 87186 SC STD MICRODIL/AGAR DIL: CPT | Performed by: UROLOGY

## 2025-02-20 ENCOUNTER — HOSPITAL ENCOUNTER (OUTPATIENT)
Dept: RADIOLOGY | Facility: HOSPITAL | Age: 75
Discharge: HOME | End: 2025-02-20
Attending: INTERNAL MEDICINE
Payer: MEDICARE

## 2025-02-20 ENCOUNTER — OFFICE VISIT (OUTPATIENT)
Dept: PULMONOLOGY | Facility: CLINIC | Age: 75
End: 2025-02-20
Attending: INTERNAL MEDICINE
Payer: MEDICARE

## 2025-02-20 VITALS
HEART RATE: 73 BPM | WEIGHT: 148.56 LBS | SYSTOLIC BLOOD PRESSURE: 134 MMHG | OXYGEN SATURATION: 95 % | BODY MASS INDEX: 26.32 KG/M2 | RESPIRATION RATE: 16 BRPM | DIASTOLIC BLOOD PRESSURE: 80 MMHG | HEIGHT: 63 IN

## 2025-02-20 DIAGNOSIS — K21.9 GERD WITHOUT ESOPHAGITIS: ICD-10-CM

## 2025-02-20 DIAGNOSIS — J84.9 INTERSTITIAL PULMONARY DISEASE, UNSPECIFIED: ICD-10-CM

## 2025-02-20 DIAGNOSIS — J84.9 INTERSTITIAL LUNG DISEASE: ICD-10-CM

## 2025-02-20 DIAGNOSIS — J20.9 ACUTE BRONCHITIS, UNSPECIFIED ORGANISM: Primary | ICD-10-CM

## 2025-02-20 DIAGNOSIS — R91.1 LUNG NODULE: ICD-10-CM

## 2025-02-20 PROCEDURE — 1159F MED LIST DOCD IN RCRD: CPT | Mod: CPTII,S$GLB,, | Performed by: INTERNAL MEDICINE

## 2025-02-20 PROCEDURE — 1101F PT FALLS ASSESS-DOCD LE1/YR: CPT | Mod: CPTII,S$GLB,, | Performed by: INTERNAL MEDICINE

## 2025-02-20 PROCEDURE — 3008F BODY MASS INDEX DOCD: CPT | Mod: CPTII,S$GLB,, | Performed by: INTERNAL MEDICINE

## 2025-02-20 PROCEDURE — 3075F SYST BP GE 130 - 139MM HG: CPT | Mod: CPTII,S$GLB,, | Performed by: INTERNAL MEDICINE

## 2025-02-20 PROCEDURE — 71250 CT THORAX DX C-: CPT | Mod: TC

## 2025-02-20 PROCEDURE — 3288F FALL RISK ASSESSMENT DOCD: CPT | Mod: CPTII,S$GLB,, | Performed by: INTERNAL MEDICINE

## 2025-02-20 PROCEDURE — 1160F RVW MEDS BY RX/DR IN RCRD: CPT | Mod: CPTII,S$GLB,, | Performed by: INTERNAL MEDICINE

## 2025-02-20 RX ORDER — FAMOTIDINE 40 MG/1
40 TABLET, FILM COATED ORAL NIGHTLY
Qty: 90 TABLET | Refills: 3 | Status: SHIPPED | OUTPATIENT
Start: 2025-02-20

## 2025-02-20 RX ORDER — PREDNISONE 20 MG/1
20 TABLET ORAL DAILY
Qty: 3 TABLET | Refills: 0 | Status: SHIPPED | OUTPATIENT
Start: 2025-02-20

## 2025-02-20 RX ORDER — NITROFURANTOIN 25; 75 MG/1; MG/1
100 CAPSULE ORAL 2 TIMES DAILY
COMMUNITY

## 2025-02-20 NOTE — PROGRESS NOTES
Subjective:      Patient ID: Gaby Stanley is a 74 y.o. female.    Chief Complaint: Interstitial Lung Disease (Rev CT)      HPI  73 year old female with history of depression, diverticulitis, GERD, HLD, HTN who is referred to Pulmonary clinic by Rachel Soto NP for hospital follow up recent admission for pneumonia.      Brief Admission Summary:   Admitted to hospital in June 2024 with sepsis/septic shock. Stool + for C. Difficile. Treated with oral vanco. No ongoing GI issues per patient      COVID June 2022- no hospitalization, no oxygen  Relapsing and remitting dry cough since at least September, possibly sooner  [place]  Quit smoking over 40 years ago, smoked 15 years     Hospital work up:  KYE positive- homogenous  Fungitell Positive (294)  Fungal immunodiffusion- negative (cocci, aspergillus, blasto, histo)  RF negative  ANCA negative     July 2024  - Here for f/u of recent hospital stay  - CT chest done while inpatient  - Showed improvement of ground glass with diffuse underlying interstitial fibrotic changes  - Finished prednisone taper  - Some complaints of cough  - Denies dyspnea  - No fever    Feb 2025  Here for follow up of the above  Main complaint is of persistent cough.  She did recently have viral type illness  Cough is mostly dry and does bother her a lot at night  Also has urinary tract infection that is currently being treated with Macrobid  Dyspnea is stable  No ongoing fevers or chills or chest pain  CT chest done today for review    Review of Systems as per history of present illness otherwise negative  Objective:     Physical Exam   Constitutional: She is oriented to person, place, and time. She appears well-developed. No distress.   HENT:   Head: Normocephalic.   Cardiovascular: Normal rate and regular rhythm.   Pulmonary/Chest: Normal expansion, symmetric chest wall expansion, effort normal and breath sounds normal. She has no wheezes. She has no rales.   Musculoskeletal:      " Cervical back: Neck supple.   Neurological: She is alert and oriented to person, place, and time.   Psychiatric: She has a normal mood and affect.   Nursing note and vitals reviewed.            2/20/2025     8:56 AM 9/12/2024     2:58 PM 8/5/2024     8:31 AM 7/24/2024     2:53 PM 7/10/2024    12:58 PM 6/28/2024    12:34 PM 6/28/2024    10:22 AM   Pulmonary Function Tests   SpO2 95 % 98 % 96 % 97 %  93 % 100 %   Height 5' 3" (1.6 m) 5' 3" (1.6 m) 5' 3" (1.6 m) 5' 3" (1.6 m) 5' 3" (1.6 m)     Weight 67.4 kg (148 lb 9.4 oz) 67.7 kg (149 lb 4 oz) 65 kg (143 lb 4.8 oz) 66.5 kg (146 lb 9.7 oz) 68 kg (149 lb 14.6 oz)     BMI (Calculated) 26.3 26.4 25.4 26 26.6          Assessment:     1. Acute bronchitis, unspecified organism    2. Interstitial lung disease    3. GERD without esophagitis    4. Lung nodule         Orders Placed This Encounter   Procedures    CT Chest Without Contrast     Standing Status:   Future     Expected Date:   8/20/2025     Expiration Date:   2/20/2026     May the Radiologist modify the order per protocol to meet the clinical needs of the patient?:   Yes     I personally reviewed CT chest images obtained today.  My interpretation is:  Right upper lobe 9 mm nodule appears grossly stable compared to prior studies but in prior studies was add mixed in with significant consolidative opacities  Compared to study dated May of 2024 it is grossly stable  Near-complete resolution of previously noted consolidative densities  Underlying diffuse peripheral fibro interstitial changes without honeycombing  No pleural effusions or adenopathy    No honeycombing.  Mild septal thickening and scattered groundglass opacities again noted.  No pulmonary nodules or areas of consolidation.  Mild scarring within the right upper lobe along the fissure as well as within the right middle lobe.  No evidence of fibrosis.  Mild air trapping is noted on expiratory views.  The lungs are otherwise clear.  No endobronchial or " endotracheal lesions are evident.      No pathologically enlarged mediastinal, hilar, or axillary lymph nodes by CT size criteria.      Plan:     Current cough is likely postviral in nature  Being exacerbated by likely nighttime reflux  We will add Pepcid 40 mg q.h.s.  Instructed her to use her albuterol inhaler as needed every 4-6 hours  Prednisone 20 mg x 3 days  Mucinex DM 12 hour tablets b.i.d.  Delsym cough syrup  We will have her return in 6 months with a CT and if that imaging is stable then can resume yearly  I personally reviewed the above with the patient and/or family including test and radiology results. They voiced understanding and agreement with the above. Questions were answered to their apparent satisfaction.

## 2025-02-21 LAB
BACTERIA UR CULT: ABNORMAL
BACTERIA UR CULT: ABNORMAL

## 2025-03-06 ENCOUNTER — PATIENT MESSAGE (OUTPATIENT)
Dept: ADMINISTRATIVE | Facility: HOSPITAL | Age: 75
End: 2025-03-06
Payer: MEDICARE

## 2025-04-03 ENCOUNTER — PATIENT OUTREACH (OUTPATIENT)
Dept: ADMINISTRATIVE | Facility: HOSPITAL | Age: 75
End: 2025-04-03
Payer: MEDICARE

## 2025-04-15 ENCOUNTER — PATIENT MESSAGE (OUTPATIENT)
Dept: NEUROLOGY | Facility: CLINIC | Age: 75
End: 2025-04-15
Payer: MEDICARE

## 2025-06-23 ENCOUNTER — HOSPITAL ENCOUNTER (OUTPATIENT)
Dept: RADIOLOGY | Facility: HOSPITAL | Age: 75
Discharge: HOME | End: 2025-06-23
Attending: FAMILY MEDICINE
Payer: MEDICARE

## 2025-06-23 DIAGNOSIS — Z01.818 OTHER SPECIFIED PRE-OPERATIVE EXAMINATION: ICD-10-CM

## 2025-06-23 DIAGNOSIS — Z01.818 OTHER SPECIFIED PRE-OPERATIVE EXAMINATION: Primary | ICD-10-CM

## 2025-06-23 PROCEDURE — 71046 X-RAY EXAM CHEST 2 VIEWS: CPT | Mod: 26,,, | Performed by: RADIOLOGY

## 2025-06-23 PROCEDURE — 71046 X-RAY EXAM CHEST 2 VIEWS: CPT | Mod: TC,PO

## 2025-06-25 ENCOUNTER — HOSPITAL ENCOUNTER (EMERGENCY)
Facility: HOSPITAL | Age: 75
Discharge: HOME | End: 2025-06-25
Attending: EMERGENCY MEDICINE
Payer: MEDICARE

## 2025-06-25 VITALS
TEMPERATURE: 98 F | HEIGHT: 64 IN | RESPIRATION RATE: 19 BRPM | BODY MASS INDEX: 23.13 KG/M2 | DIASTOLIC BLOOD PRESSURE: 68 MMHG | SYSTOLIC BLOOD PRESSURE: 149 MMHG | WEIGHT: 135.5 LBS | OXYGEN SATURATION: 97 % | HEART RATE: 80 BPM

## 2025-06-25 DIAGNOSIS — K59.00 CONSTIPATION, UNSPECIFIED CONSTIPATION TYPE: ICD-10-CM

## 2025-06-25 DIAGNOSIS — N30.01 ACUTE CYSTITIS WITH HEMATURIA: ICD-10-CM

## 2025-06-25 DIAGNOSIS — K62.89 PROCTITIS: Primary | ICD-10-CM

## 2025-06-25 LAB
ABSOLUTE EOSINOPHIL (OHS): 0.05 K/UL
ABSOLUTE MONOCYTE (OHS): 0.69 K/UL (ref 0.3–1)
ABSOLUTE NEUTROPHIL COUNT (OHS): 10.31 K/UL (ref 1.8–7.7)
ALBUMIN SERPL BCP-MCNC: 4 G/DL (ref 3.5–5.2)
ALP SERPL-CCNC: 100 UNIT/L (ref 40–150)
ALT SERPL W/O P-5'-P-CCNC: 14 UNIT/L (ref 10–44)
ANION GAP (OHS): 13 MMOL/L (ref 8–16)
AST SERPL-CCNC: 22 UNIT/L (ref 11–45)
BACTERIA #/AREA URNS HPF: ABNORMAL /HPF
BASOPHILS # BLD AUTO: 0.03 K/UL
BASOPHILS NFR BLD AUTO: 0.3 %
BILIRUB SERPL-MCNC: 0.3 MG/DL (ref 0.1–1)
BILIRUB UR QL STRIP.AUTO: NEGATIVE
BUN SERPL-MCNC: 21 MG/DL (ref 8–23)
CALCIUM SERPL-MCNC: 9 MG/DL (ref 8.7–10.5)
CHLORIDE SERPL-SCNC: 103 MMOL/L (ref 95–110)
CLARITY UR: ABNORMAL
CO2 SERPL-SCNC: 24 MMOL/L (ref 23–29)
COLOR UR AUTO: ABNORMAL
CREAT SERPL-MCNC: 0.9 MG/DL (ref 0.5–1.4)
ERYTHROCYTE [DISTWIDTH] IN BLOOD BY AUTOMATED COUNT: 16.3 % (ref 11.5–14.5)
GFR SERPLBLD CREATININE-BSD FMLA CKD-EPI: >60 ML/MIN/1.73/M2
GLUCOSE SERPL-MCNC: 117 MG/DL (ref 70–110)
GLUCOSE UR QL STRIP: NEGATIVE
HCT VFR BLD AUTO: 32.7 % (ref 37–48.5)
HCV AB SERPL QL IA: NEGATIVE
HGB BLD-MCNC: 10.3 GM/DL (ref 12–16)
HGB UR QL STRIP: ABNORMAL
HIV 1+2 AB+HIV1 P24 AG SERPL QL IA: NEGATIVE
HYALINE CASTS #/AREA URNS LPF: 2 /LPF (ref 0–1)
IMM GRANULOCYTES # BLD AUTO: 0.06 K/UL (ref 0–0.04)
IMM GRANULOCYTES NFR BLD AUTO: 0.5 % (ref 0–0.5)
KETONES UR QL STRIP: NEGATIVE
LEUKOCYTE ESTERASE UR QL STRIP: ABNORMAL
LYMPHOCYTES # BLD AUTO: 0.73 K/UL (ref 1–4.8)
MCH RBC QN AUTO: 26.8 PG (ref 27–31)
MCHC RBC AUTO-ENTMCNC: 31.5 G/DL (ref 32–36)
MCV RBC AUTO: 85 FL (ref 82–98)
MICROSCOPIC COMMENT: ABNORMAL
NITRITE UR QL STRIP: POSITIVE
NUCLEATED RBC (/100WBC) (OHS): 0 /100 WBC
PH UR STRIP: 6 [PH]
PLATELET # BLD AUTO: 189 K/UL (ref 150–450)
PMV BLD AUTO: 10.5 FL (ref 9.2–12.9)
POTASSIUM SERPL-SCNC: 3.6 MMOL/L (ref 3.5–5.1)
PROT SERPL-MCNC: 7.3 GM/DL (ref 6–8.4)
PROT UR QL STRIP: ABNORMAL
RBC # BLD AUTO: 3.85 M/UL (ref 4–5.4)
RBC #/AREA URNS HPF: 20 /HPF (ref 0–4)
RELATIVE EOSINOPHIL (OHS): 0.4 %
RELATIVE LYMPHOCYTE (OHS): 6.1 % (ref 18–48)
RELATIVE MONOCYTE (OHS): 5.8 % (ref 4–15)
RELATIVE NEUTROPHIL (OHS): 86.9 % (ref 38–73)
SODIUM SERPL-SCNC: 140 MMOL/L (ref 136–145)
SP GR UR STRIP: 1.02
SQUAMOUS #/AREA URNS HPF: 0 /HPF
UROBILINOGEN UR STRIP-ACNC: NEGATIVE EU/DL
WBC # BLD AUTO: 11.87 K/UL (ref 3.9–12.7)
WBC #/AREA URNS HPF: 1 /HPF (ref 0–5)
YEAST URNS QL MICRO: ABNORMAL /HPF

## 2025-06-25 PROCEDURE — 86803 HEPATITIS C AB TEST: CPT | Performed by: EMERGENCY MEDICINE

## 2025-06-25 PROCEDURE — 96375 TX/PRO/DX INJ NEW DRUG ADDON: CPT | Mod: ER

## 2025-06-25 PROCEDURE — 87389 HIV-1 AG W/HIV-1&-2 AB AG IA: CPT | Performed by: EMERGENCY MEDICINE

## 2025-06-25 PROCEDURE — 85025 COMPLETE CBC W/AUTO DIFF WBC: CPT | Mod: ER | Performed by: EMERGENCY MEDICINE

## 2025-06-25 PROCEDURE — 81003 URINALYSIS AUTO W/O SCOPE: CPT | Mod: ER | Performed by: EMERGENCY MEDICINE

## 2025-06-25 PROCEDURE — 96374 THER/PROPH/DIAG INJ IV PUSH: CPT | Mod: ER

## 2025-06-25 PROCEDURE — 25500020 PHARM REV CODE 255: Mod: ER | Performed by: EMERGENCY MEDICINE

## 2025-06-25 PROCEDURE — 99285 EMERGENCY DEPT VISIT HI MDM: CPT | Mod: 25,ER

## 2025-06-25 PROCEDURE — 84075 ASSAY ALKALINE PHOSPHATASE: CPT | Mod: ER | Performed by: EMERGENCY MEDICINE

## 2025-06-25 PROCEDURE — 63600175 PHARM REV CODE 636 W HCPCS: Mod: ER | Performed by: EMERGENCY MEDICINE

## 2025-06-25 RX ORDER — ONDANSETRON 4 MG/1
4 TABLET, ORALLY DISINTEGRATING ORAL EVERY 6 HOURS PRN
Qty: 15 TABLET | Refills: 0 | Status: SHIPPED | OUTPATIENT
Start: 2025-06-25

## 2025-06-25 RX ORDER — CIPROFLOXACIN 500 MG/1
500 TABLET, FILM COATED ORAL 2 TIMES DAILY
Qty: 14 TABLET | Refills: 0 | Status: SHIPPED | OUTPATIENT
Start: 2025-06-25 | End: 2025-07-02

## 2025-06-25 RX ORDER — ONDANSETRON HYDROCHLORIDE 2 MG/ML
4 INJECTION, SOLUTION INTRAVENOUS
Status: COMPLETED | OUTPATIENT
Start: 2025-06-25 | End: 2025-06-25

## 2025-06-25 RX ORDER — METRONIDAZOLE 500 MG/1
500 TABLET ORAL EVERY 12 HOURS
Qty: 14 TABLET | Refills: 0 | Status: SHIPPED | OUTPATIENT
Start: 2025-06-25 | End: 2025-07-02

## 2025-06-25 RX ORDER — MORPHINE SULFATE 4 MG/ML
4 INJECTION, SOLUTION INTRAMUSCULAR; INTRAVENOUS
Refills: 0 | Status: COMPLETED | OUTPATIENT
Start: 2025-06-25 | End: 2025-06-25

## 2025-06-25 RX ORDER — DOCUSATE SODIUM 100 MG/1
100 CAPSULE, LIQUID FILLED ORAL 3 TIMES DAILY PRN
Qty: 30 CAPSULE | Refills: 0 | Status: SHIPPED | OUTPATIENT
Start: 2025-06-25

## 2025-06-25 RX ORDER — POLYETHYLENE GLYCOL 3350 17 G/17G
17 POWDER, FOR SOLUTION ORAL DAILY
Qty: 119 G | Refills: 0 | Status: SHIPPED | OUTPATIENT
Start: 2025-06-25 | End: 2025-07-02

## 2025-06-25 RX ADMIN — MORPHINE SULFATE 4 MG: 4 INJECTION INTRAVENOUS at 12:06

## 2025-06-25 RX ADMIN — IOHEXOL 100 ML: 350 INJECTION, SOLUTION INTRAVENOUS at 12:06

## 2025-06-25 RX ADMIN — ONDANSETRON 4 MG: 2 INJECTION INTRAMUSCULAR; INTRAVENOUS at 11:06

## 2025-06-25 NOTE — ED PROVIDER NOTES
Encounter Date: 6/25/2025       History     Chief Complaint   Patient presents with    Constipation     Pt reports abdominal pain w/ constipation. Last bowel movement was over the weekend. +N/V. Pt reports straining when trying to have a bowel movement and began to have some rectal bleeding.     The history is provided by the patient.   Constipation   The current episode started several days ago. The problem occurs occasionally. The problem has been unchanged. The stool is described as hard and bloody. Associated symptoms include abdominal pain. Pertinent negatives include no fever, no nausea, no vomiting, no chest pain, no headaches and no coughing.     Review of patient's allergies indicates:   Allergen Reactions    Amitriptyline     Amoxicillin-pot clavulanate Other (See Comments)    Sulfa (sulfonamide antibiotics)      Past Medical History:   Diagnosis Date    Chronic headaches     Depression     Diverticulitis     GERD (gastroesophageal reflux disease)     Hypercholesteremia     Hypertension     ILD (interstitial lung disease)     Migraine headache      Past Surgical History:   Procedure Laterality Date    ABDOMINAL SURGERY      APPENDECTOMY      CATARACT EXTRACTION      EYE SURGERY      HYSTERECTOMY      TONSILLECTOMY       No family history on file.  Social History[1]  Review of Systems   Constitutional:  Negative for fever.   HENT: Negative.  Negative for congestion and sore throat.    Eyes: Negative.    Respiratory: Negative.  Negative for cough and shortness of breath.    Cardiovascular:  Negative for chest pain.   Gastrointestinal:  Positive for abdominal pain and constipation. Negative for nausea and vomiting.   Genitourinary:  Negative for dysuria.   Neurological:  Negative for weakness, numbness and headaches.   Psychiatric/Behavioral:  Negative for confusion.        Physical Exam     Initial Vitals [06/25/25 1133]   BP Pulse Resp Temp SpO2   (!) 157/87 86 20 97.9 °F (36.6 °C) 99 %      MAP       --          Physical Exam    Constitutional: She appears well-developed and well-nourished. No distress.   HENT:   Head: Normocephalic and atraumatic.   Eyes: Conjunctivae are normal. Pupils are equal, round, and reactive to light.   Neck: Neck supple.   Normal range of motion.  Cardiovascular:  Normal rate, regular rhythm and normal heart sounds.           Pulmonary/Chest: Breath sounds normal.   Abdominal: Abdomen is soft. Bowel sounds are normal. She exhibits no distension. There is abdominal tenderness in the suprapubic area. There is no rebound.   Genitourinary: Rectum:      No abnormal anal tone.      Genitourinary Comments: Small amount of soft stool in the vault. No blood.     Musculoskeletal:         General: No edema. Normal range of motion.      Cervical back: Normal range of motion and neck supple.     Neurological: She is alert and oriented to person, place, and time. She has normal strength.   Skin: Skin is warm and dry.   Psychiatric: She has a normal mood and affect.         ED Course   Procedures  Labs Reviewed   COMPREHENSIVE METABOLIC PANEL - Abnormal       Result Value    Sodium 140      Potassium 3.6      Chloride 103      CO2 24      Glucose 117 (*)     BUN 21      Creatinine 0.9      Calcium 9.0      Protein Total 7.3      Albumin 4.0      Bilirubin Total 0.3            AST 22      ALT 14      Anion Gap 13      eGFR >60     URINALYSIS, REFLEX TO URINE CULTURE - Abnormal    Color, UA Latosha      Appearance, UA Hazy (*)     pH, UA 6.0      Spec Grav UA 1.025      Protein, UA Trace (*)     Glucose, UA Negative      Ketones, UA Negative      Bilirubin, UA Negative      Blood, UA 3+ (*)     Nitrites, UA Positive (*)     Urobilinogen, UA Negative      Leukocyte Esterase, UA Trace (*)    CBC WITH DIFFERENTIAL - Abnormal    WBC 11.87      RBC 3.85 (*)     HGB 10.3 (*)     HCT 32.7 (*)     MCV 85      MCH 26.8 (*)     MCHC 31.5 (*)     RDW 16.3 (*)     Platelet Count 189      MPV 10.5      Nucleated RBC  0      Neut % 86.9 (*)     Lymph % 6.1 (*)     Mono % 5.8      Eos % 0.4      Basophil % 0.3      Imm Grans % 0.5      Neut # 10.31 (*)     Lymph # 0.73 (*)     Mono # 0.69      Eos # 0.05      Baso # 0.03      Imm Grans # 0.06 (*)    URINALYSIS MICROSCOPIC - Abnormal    RBC, UA 20 (*)     WBC, UA 1      Bacteria, UA Many (*)     Yeast, UA None      Squamous Epithelial Cells, UA 0      Hyaline Casts, UA 2 (*)     Microscopic Comment       CBC W/ AUTO DIFFERENTIAL    Narrative:     The following orders were created for panel order CBC Auto Differential.  Procedure                               Abnormality         Status                     ---------                               -----------         ------                     CBC with Differential[[id number]]       Abnormal            Final result                 Please view results for these tests on the individual orders.   HEPATITIS C ANTIBODY   HEP C VIRUS HOLD SPECIMEN   HIV 1 / 2 ANTIBODY   GREY TOP URINE HOLD          Imaging Results              CTA Acute GI Bleed, Abdomen and Pelvis (Final result)  Result time 06/25/25 13:05:40      Final result by Celestino Bautista MD (06/25/25 13:05:40)                   Impression:      1.  Marked inflammatory changes surround a thick walled rectum consistent with proctitis.  There is hyperemia of the rectal mucosa, without active extravasation of contrast at this time.    2.  Increased stool burden.  Constipation symptoms?    3.  9 mm uncinate process cyst of the pancreas, not definitely seen previously.  Further characterization with a nonemergent pancreas MRI with contrast recommended.    4.  Negative for acute process otherwise.  Numerous stable findings as noted above.    All CT scans at this facility are performed  using dose modulation techniques as appropriate to performed exam including the following:  automated exposure control; adjustment of mA and/or kV according to the patients size (this includes techniques or  standardized protocols for targeted exams where dose is matched to indication/reason for exam: i.e. extremities or head);  iterative reconstruction technique.      Electronically signed by: Celestino Bautista MD  Date:    06/25/2025  Time:    13:05               Narrative:    EXAMINATION:  CTA ACUTE GI BLEED, ABDOMEN AND PELVIS, multiplanar reconstructions    CLINICAL HISTORY:  rectal bleeding;    TECHNIQUE:  Axial images through the abdomen and pelvis were obtained with and without the use of IV contrast.  Sagittal, coronal and 3D MIP reconstructions are provided for review and permanently archived.  Oral contrast was not utilized.    COMPARISON:  August 20, 2024    FINDINGS:  LUNG BASES: Stable scarring in the inferior left lung with left lower lobe calcified granuloma.  Lung bases are free of new pulmonary opacities.  Negative for pleural or pericardial effusions. The distal esophagus is normal.  Coronary artery calcifications noted.    ABDOMEN: On the precontrast images, there are no renal stones.  On the postcontrast images, there is symmetric uptake of contrast into both kidneys.  Subcentimeter renal cysts noted.  Fatty infiltration of the liver.  Focal fatty infiltration medial and lateral segments of the left hepatic lobe along the falciform ligament.  Hepatomegaly versus Riedel's lobe again seen.  9 mm uncinate process cyst, reference image 50 of series 8.  The liver, spleen and gallbladder otherwise appear normal.  The pancreas is otherwise normal.  Kidneys and adrenal glands are otherwise normal.    Negative for adenopathy or ascites noted within the abdomen or pelvis.  Vascular calcifications are present without aneurysmal changes. Portal vein is patent.    On the precontrast images, there is intrinsic high density material within the colon and multiple colonic diverticula.  The these changes could obscure subtle active GI bleed.  No definite CT evidence for active GI bleeding at this time.    Inflammatory  changes surround the thick walled rectum with significant hyperemia noted.    Mildly increased stool burden.  Negative for dilated loops of large or small bowel.  There are postoperative changes within the epigastric region adjacent to the stomach.  I do not see a normal or an abnormal appendix.  Negative for free air.    PELVIS: The urinary bladder is unremarkable.  The uterus is absent.  The rest of the female pelvic organs are normal. There are pelvic phleboliths.    Fat filled umbilical hernia.  The abdominal wall is otherwise intact.    No significant osseous abnormality is identified.  Negative for significant spinal canal stenosis. Stable degenerative changes involving the spine and pelvis with stable S-shaped curvature.    Negative for groin adenopathy.                                       Medications   ondansetron injection 4 mg (4 mg Intravenous Given 6/25/25 1157)   morphine injection 4 mg (4 mg Intravenous Given 6/25/25 1230)   iohexoL (OMNIPAQUE 350) injection 100 mL (100 mLs Intravenous Given 6/25/25 1239)     Medical Decision Making  DDx: rectal bleeding, diverticulitis    Amount and/or Complexity of Data Reviewed  Labs: ordered.  Radiology: ordered.    Risk  OTC drugs.  Prescription drug management.                                      Clinical Impression:  Final diagnoses:  [K62.89] Proctitis (Primary)  [K59.00] Constipation, unspecified constipation type  [N30.01] Acute cystitis with hematuria          ED Disposition Condition    Discharge Stable          ED Prescriptions       Medication Sig Dispense Start Date End Date Auth. Provider    ondansetron (ZOFRAN-ODT) 4 MG TbDL Take 1 tablet (4 mg total) by mouth every 6 (six) hours as needed. 15 tablet 6/25/2025 -- James Coreas MD    ciprofloxacin HCl (CIPRO) 500 MG tablet Take 1 tablet (500 mg total) by mouth 2 (two) times daily. for 7 days 14 tablet 6/25/2025 7/2/2025 James Coreas MD    metroNIDAZOLE (FLAGYL) 500 MG tablet Take 1  tablet (500 mg total) by mouth every 12 (twelve) hours. for 7 days 14 tablet 6/25/2025 7/2/2025 James Coreas MD    docusate sodium (COLACE) 100 MG capsule Take 1 capsule (100 mg total) by mouth 3 (three) times daily as needed for Constipation. 30 capsule 6/25/2025 -- James Coreas MD    polyethylene glycol (GLYCOLAX) 17 gram/dose powder Take 17 g by mouth once daily. for 7 days 119 g 6/25/2025 7/2/2025 James Coreas MD          Follow-up Information       Follow up With Specialties Details Why Contact Info    Aaron Narayan MD Internal Medicine, Family Medicine   [address] [place] [address] [phone]                     [1]   Social History  Tobacco Use    Smoking status: Former    Smokeless tobacco: Never   Vaping Use    Vaping status: Never Used   Substance Use Topics    Alcohol use: No    Drug use: No        James Coreas MD  06/25/25 1017

## 2025-06-26 LAB — HOLD SPECIMEN: NORMAL

## 2025-06-28 LAB — HOLD SPECIMEN: NORMAL

## 2025-07-23 ENCOUNTER — LAB VISIT (OUTPATIENT)
Dept: LAB | Facility: HOSPITAL | Age: 75
End: 2025-07-23
Attending: UROLOGY
Payer: MEDICARE

## 2025-07-23 DIAGNOSIS — R30.0 DYSURIA: Primary | ICD-10-CM

## 2025-07-23 DIAGNOSIS — R30.0 DYSURIA: ICD-10-CM

## 2025-07-23 LAB
AMORPH CRY URNS QL MICRO: ABNORMAL
BACTERIA #/AREA URNS HPF: ABNORMAL /HPF
BILIRUB UR QL STRIP.AUTO: ABNORMAL
CLARITY UR: ABNORMAL
COLOR UR AUTO: YELLOW
GLUCOSE UR QL STRIP: NEGATIVE
HGB UR QL STRIP: ABNORMAL
HYALINE CASTS #/AREA URNS LPF: 1 /LPF (ref 0–1)
KETONES UR QL STRIP: ABNORMAL
LEUKOCYTE ESTERASE UR QL STRIP: ABNORMAL
MICROSCOPIC COMMENT: ABNORMAL
NITRITE UR QL STRIP: NEGATIVE
PH UR STRIP: 6 [PH]
PROT UR QL STRIP: ABNORMAL
RBC #/AREA URNS HPF: 2 /HPF (ref 0–4)
SP GR UR STRIP: 1.02
UROBILINOGEN UR STRIP-ACNC: NEGATIVE EU/DL
WBC #/AREA URNS HPF: 40 /HPF (ref 0–5)
WBC CLUMPS URNS QL MICRO: ABNORMAL

## 2025-07-23 PROCEDURE — 87186 SC STD MICRODIL/AGAR DIL: CPT

## 2025-07-23 PROCEDURE — 81003 URINALYSIS AUTO W/O SCOPE: CPT | Mod: PO

## 2025-07-24 ENCOUNTER — OFFICE VISIT (OUTPATIENT)
Dept: INTERNAL MEDICINE | Facility: CLINIC | Age: 75
End: 2025-07-24
Payer: MEDICARE

## 2025-07-24 VITALS
HEIGHT: 64 IN | TEMPERATURE: 98 F | SYSTOLIC BLOOD PRESSURE: 145 MMHG | OXYGEN SATURATION: 96 % | DIASTOLIC BLOOD PRESSURE: 68 MMHG | HEART RATE: 64 BPM | WEIGHT: 144.19 LBS | RESPIRATION RATE: 18 BRPM | BODY MASS INDEX: 24.62 KG/M2

## 2025-07-24 DIAGNOSIS — I10 PRIMARY HYPERTENSION: ICD-10-CM

## 2025-07-24 DIAGNOSIS — J84.9 INTERSTITIAL LUNG DISEASE: ICD-10-CM

## 2025-07-24 DIAGNOSIS — N30.01 ACUTE CYSTITIS WITH HEMATURIA: Primary | ICD-10-CM

## 2025-07-24 PROCEDURE — 1159F MED LIST DOCD IN RCRD: CPT | Mod: CPTII,S$GLB,, | Performed by: NURSE PRACTITIONER

## 2025-07-24 PROCEDURE — 99999 PR PBB SHADOW E&M-EST. PATIENT-LVL IV: CPT | Mod: PBBFAC,,, | Performed by: NURSE PRACTITIONER

## 2025-07-24 PROCEDURE — 1160F RVW MEDS BY RX/DR IN RCRD: CPT | Mod: CPTII,S$GLB,, | Performed by: NURSE PRACTITIONER

## 2025-07-24 PROCEDURE — 3077F SYST BP >= 140 MM HG: CPT | Mod: CPTII,S$GLB,, | Performed by: NURSE PRACTITIONER

## 2025-07-24 PROCEDURE — 3078F DIAST BP <80 MM HG: CPT | Mod: CPTII,S$GLB,, | Performed by: NURSE PRACTITIONER

## 2025-07-24 PROCEDURE — 1125F AMNT PAIN NOTED PAIN PRSNT: CPT | Mod: CPTII,S$GLB,, | Performed by: NURSE PRACTITIONER

## 2025-07-24 PROCEDURE — 3288F FALL RISK ASSESSMENT DOCD: CPT | Mod: CPTII,S$GLB,, | Performed by: NURSE PRACTITIONER

## 2025-07-24 PROCEDURE — 99214 OFFICE O/P EST MOD 30 MIN: CPT | Mod: S$GLB,,, | Performed by: NURSE PRACTITIONER

## 2025-07-24 PROCEDURE — 1101F PT FALLS ASSESS-DOCD LE1/YR: CPT | Mod: CPTII,S$GLB,, | Performed by: NURSE PRACTITIONER

## 2025-07-24 RX ORDER — CEFDINIR 300 MG/1
300 CAPSULE ORAL 2 TIMES DAILY
Qty: 14 CAPSULE | Refills: 0 | Status: SHIPPED | OUTPATIENT
Start: 2025-07-24 | End: 2025-07-31

## 2025-07-24 RX ORDER — PROMETHAZINE HYDROCHLORIDE 25 MG/1
25 TABLET ORAL EVERY 6 HOURS PRN
Qty: 20 TABLET | Refills: 0 | Status: SHIPPED | OUTPATIENT
Start: 2025-07-24 | End: 2025-07-29

## 2025-07-24 NOTE — PROGRESS NOTES
Subjective:       Patient ID: Gaby Stanley is a 75 y.o. female.    Chief Complaint: Urinary Tract Infection and Follow-up    History of Present Illness    HPI:  Ms. Stanley reports urinary symptoms including burning, urgency, and pain for the past 2 days, with frequent urination and general discomfort in the urinary area. She also mentions some nausea. She had a urinalysis done yesterday ordered by Dr. Ortega, which showed abnormal results. She called the office yesterday and this morning, but received no response or antibiotics, leading to her frustration.    She recently had hand surgery. Her stitches are scheduled to be removed tomorrow, and she may need to undergo therapy for her hand.    She denies flank pain, blood in her urine, fever, and chills. Her  checked her temperature this morning.    She has a history of recurrent UTIs. 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 Pulmonology had advised to avoid macrodantin.    TEST RESULTS:  - Urinalysis: Yesterday, 1+ blood, 3+ leukocytes, negative nitrites, 40+ white blood cells on microscopy, rare bacteria seen       Last urine culture February 2025: + Ecoli and Klebsiella pneumoniae      Urinary Tract Infection   The current episode started yesterday. There has been no fever. Associated symptoms include frequency, nausea and urgency. Pertinent negatives include no behavior changes, chills, discharge, flank pain, hematuria, hesitancy, vomiting or constipation.       Problem List[1]    No family history on file.  Past Surgical History:   Procedure Laterality Date    ABDOMINAL SURGERY      APPENDECTOMY      CATARACT EXTRACTION      EYE SURGERY      HYSTERECTOMY      TONSILLECTOMY         Current Medications[2]    Review of Systems   Constitutional:  Negative for chills and fever.   Gastrointestinal:  Positive for abdominal pain and nausea. Negative for constipation and  "vomiting.   Genitourinary:  Positive for difficulty urinating, dysuria, frequency and urgency. Negative for flank pain, hematuria and hesitancy.       Objective:   BP (!) 145/68   Pulse 64   Temp 97.9 °F (36.6 °C) (Tympanic)   Resp 18   Ht 5' 4" (1.626 m)   Wt 65.4 kg (144 lb 2.9 oz)   SpO2 96%   BMI 24.75 kg/m²      Physical Exam  Constitutional:       General: She is not in acute distress.     Appearance: Normal appearance. She is not ill-appearing, toxic-appearing or diaphoretic.   Eyes:      Conjunctiva/sclera: Conjunctivae normal.   Cardiovascular:      Rate and Rhythm: Normal rate.   Pulmonary:      Effort: Pulmonary effort is normal. No respiratory distress.   Abdominal:      General: There is no distension.      Palpations: Abdomen is soft. There is no mass.      Tenderness: There is abdominal tenderness in the suprapubic area. There is no right CVA tenderness, left CVA tenderness, guarding or rebound.   Skin:         Neurological:      Mental Status: She is alert and oriented to person, place, and time.      Coordination: Coordination normal.      Gait: Gait normal.   Psychiatric:         Mood and Affect: Mood normal.         Behavior: Behavior normal.         Assessment & Plan     Assessment & Plan    URINARY TRACT INFECTION:  - Assessed urinary symptoms and urinalysis results showing infection signs (1+ blood, 3+ leukocytes, 40+ WBCs) with negative nitrites and minimal bacteria.  - Ms. Stanley reports burning, urgency, pain during urination, frequency, and malaise.  - Urine culture pending.  - Prescribed Cefdinir (Omnicef) twice daily for 7 days, avoiding ciprofloxacin due to recent use one month prior.  - Avoiding Macrobid due to ILD.  - Medication selection based on allergy profile.  - Instructed to contact office if symptoms worsen over weekend, especially if fever, flank pain, or persistent vomiting develop.  - Will follow up on culture results ordered by Dr. Ortega to ensure appropriate " "antibiotic coverage.    IRRITABLE BOWEL SYNDROME:  - Ms. Stanley reports history of constipation and bleeding, previously diagnosed with Irritable Bowel Syndrome after diagnostic imaging.  - Bowel movements have normalized with current treatment.  - Prescribed Colace and Miralax for ongoing constipation management.    NAUSEA:  - Ms. Stanley reports mild nausea and requests medication for symptom relief.  - Prescribed Phenergan as needed, with caution about potential drowsiness.  - Confirmed patient has previously used Phenergan with good tolerance.    HISTORY OF URINARY SYSTEM ISSUES:  - Ms. Stanley was treated with ciprofloxacin for a bladder infection 1 month ago, indicating a history of recurrent urinary system issues.    ALLERGY TO PENICILLIN:  - Ms. Stanley is allergic to augmentin.  - Avoided prescribing penicillin-based antibiotics due to this documented allergy.    ALLERGY TO SULFONAMIDES:  - Ms. Stanley is allergic to sulfa drugs.  - Avoided prescribing sulfa-based antibiotics due to this documented allergy.           1. Acute cystitis with hematuria  -     cefdinir (OMNICEF) 300 MG capsule; Take 1 capsule (300 mg total) by mouth 2 (two) times daily. for 7 days  Dispense: 14 capsule; Refill: 0  -     promethazine (PHENERGAN) 25 MG tablet; Take 1 tablet (25 mg total) by mouth every 6 (six) hours as needed for Nausea.  Dispense: 20 tablet; Refill: 0    2. 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 Keep scheduled follow up with pulmonology.      3. Primary hypertension  Assessment & Plan:  BP elevated today in clinic in the setting for UTI. Continue to monitor. Follow up with PCP              CECILY Mejia    This note was generated with the assistance of ambient listening technology. Verbal consent was obtained by the patient and accompanying visitor(s) for the recording of patient appointment to facilitate " "this note. I attest to having reviewed and edited the generated note for accuracy, though some syntax or spelling errors may persist. Please contact the author of this note for any clarification.       Portions of this note may have been created with voice recognition software. Occasional "wrong-word" or "sound-a-like" substitutions may have occurred due to the inherent limitations of voice recognition software. Please, read the note carefully and recognize, using context, where substitutions have occurred.             [1]   Patient Active Problem List  Diagnosis    Anxiety    Migraine    Hyperlipidemia LDL goal <130    Hypertension    Pernicious anemia    GERD (gastroesophageal reflux disease)    Cough    Abnormal CT of the chest    Interstitial lung disease    Other specified disorders involving the immune mechanism, not elsewhere classified   [2]   Current Outpatient Medications:     butalbital-acetaminophen-caffeine -40 mg (FIORICET, ESGIC) -40 mg per tablet, Take 1 tablet by mouth every 6 (six) hours as needed for Pain., Disp: , Rfl:     cyanocobalamin 1,000 mcg/mL injection, Inject 1,000 mcg into the muscle., Disp: , Rfl:     docusate sodium (COLACE) 100 MG capsule, Take 1 capsule (100 mg total) by mouth 3 (three) times daily as needed for Constipation., Disp: 30 capsule, Rfl: 0    famotidine (PEPCID) 40 MG tablet, Take 1 tablet (40 mg total) by mouth every evening., Disp: 90 tablet, Rfl: 3    omeprazole (PRILOSEC) 10 MG capsule, Take 40 mg by mouth once daily., Disp: , Rfl:     ondansetron (ZOFRAN) 4 MG tablet, Take 4 mg by mouth every 8 (eight) hours as needed., Disp: , Rfl:     ondansetron (ZOFRAN-ODT) 4 MG TbDL, Take 1 tablet (4 mg total) by mouth every 6 (six) hours as needed., Disp: 15 tablet, Rfl: 0    ROSUVASTATIN CALCIUM (CRESTOR ORAL), Take by mouth., Disp: , Rfl:     sertraline (ZOLOFT) 100 MG tablet, Take 200 mg by mouth once daily., Disp: , Rfl:     sumatriptan (IMITREX) 100 MG tablet, " Take 100 mg by mouth every 2 (two) hours as needed., Disp: , Rfl:     traZODone (DESYREL) 100 MG tablet, Take 2 tablets by mouth every evening., Disp: , Rfl:     cefdinir (OMNICEF) 300 MG capsule, Take 1 capsule (300 mg total) by mouth 2 (two) times daily. for 7 days, Disp: 14 capsule, Rfl: 0    promethazine (PHENERGAN) 25 MG tablet, Take 1 tablet (25 mg total) by mouth every 6 (six) hours as needed for Nausea., Disp: 20 tablet, Rfl: 0

## 2025-07-27 LAB — BACTERIA UR CULT: ABNORMAL

## 2025-08-15 DIAGNOSIS — N30.01 ACUTE CYSTITIS WITH HEMATURIA: ICD-10-CM

## 2025-08-15 RX ORDER — PROMETHAZINE HYDROCHLORIDE 25 MG/1
25 TABLET ORAL EVERY 6 HOURS PRN
Qty: 20 TABLET | Refills: 0 | Status: SHIPPED | OUTPATIENT
Start: 2025-08-15